# Patient Record
Sex: MALE | Race: BLACK OR AFRICAN AMERICAN | Employment: FULL TIME | URBAN - METROPOLITAN AREA
[De-identification: names, ages, dates, MRNs, and addresses within clinical notes are randomized per-mention and may not be internally consistent; named-entity substitution may affect disease eponyms.]

---

## 2017-02-09 ENCOUNTER — OFFICE VISIT (OUTPATIENT)
Dept: INTERNAL MEDICINE CLINIC | Facility: CLINIC | Age: 52
End: 2017-02-09

## 2017-02-09 ENCOUNTER — TELEPHONE (OUTPATIENT)
Dept: INTERNAL MEDICINE CLINIC | Facility: CLINIC | Age: 52
End: 2017-02-09

## 2017-02-09 VITALS
WEIGHT: 194.38 LBS | HEIGHT: 67 IN | SYSTOLIC BLOOD PRESSURE: 157 MMHG | TEMPERATURE: 98 F | DIASTOLIC BLOOD PRESSURE: 109 MMHG | HEART RATE: 91 BPM | BODY MASS INDEX: 30.51 KG/M2

## 2017-02-09 DIAGNOSIS — N52.9 ED (ERECTILE DYSFUNCTION) OF ORGANIC ORIGIN: ICD-10-CM

## 2017-02-09 DIAGNOSIS — IMO0001 UNCONTROLLED DIABETES MELLITUS TYPE 2 WITHOUT COMPLICATIONS, UNSPECIFIED LONG TERM INSULIN USE STATUS: ICD-10-CM

## 2017-02-09 DIAGNOSIS — R35.1 NOCTURIA: ICD-10-CM

## 2017-02-09 DIAGNOSIS — E78.5 HYPERLIPIDEMIA LDL GOAL <100: ICD-10-CM

## 2017-02-09 DIAGNOSIS — I10 ESSENTIAL HYPERTENSION: Primary | ICD-10-CM

## 2017-02-09 PROCEDURE — 99204 OFFICE O/P NEW MOD 45 MIN: CPT | Performed by: INTERNAL MEDICINE

## 2017-02-09 PROCEDURE — 99213 OFFICE O/P EST LOW 20 MIN: CPT | Performed by: INTERNAL MEDICINE

## 2017-02-09 RX ORDER — TADALAFIL 10 MG/1
10 TABLET ORAL
Qty: 6 TABLET | Refills: 3 | Status: SHIPPED | OUTPATIENT
Start: 2017-02-09 | End: 2018-01-26

## 2017-02-09 RX ORDER — ATORVASTATIN CALCIUM 40 MG/1
40 TABLET, FILM COATED ORAL NIGHTLY
Qty: 30 TABLET | Refills: 5 | Status: SHIPPED | OUTPATIENT
Start: 2017-02-09 | End: 2018-03-30

## 2017-02-09 RX ORDER — ATORVASTATIN CALCIUM 40 MG/1
40 TABLET, FILM COATED ORAL NIGHTLY
COMMUNITY
End: 2017-02-09

## 2017-02-09 RX ORDER — LOSARTAN POTASSIUM AND HYDROCHLOROTHIAZIDE 12.5; 5 MG/1; MG/1
30 TABLET ORAL DAILY
Qty: 30 TABLET | Refills: 5 | Status: SHIPPED | OUTPATIENT
Start: 2017-02-09 | End: 2017-02-10

## 2017-02-09 NOTE — TELEPHONE ENCOUNTER
Pharmacy      [facility] [id number] - [address], [address] [phone], [phone]       Medication Detail         Disp Refills Start End        Losartan Potassium-HCTZ 50-12.5 MG Oral Tab 30 tablet 5 2/9/2017       Sig - Route:  Take 30 t

## 2017-02-09 NOTE — TELEPHONE ENCOUNTER
Pt was seen in the office today. Melodie Smith states that they need clarification on losartan -hctz medication. Per Melodie Smith, please, send new e-script with clarification and direction. Per Benjamin Grey RN they will call pharmacy back.

## 2017-02-09 NOTE — PROGRESS NOTES
HPI:    Patient ID: Mariosl Andino is a 54year old male. This is a new pt. Pt is not currently taking atorvastatin calcium 40 mg. He has been off of atorvastatin for one year due to an issue with his insurance.  Due to the same insurance issue the pt had b shortness of breath. Cardiovascular: Negative for chest pain. Gastrointestinal: Negative for diarrhea, constipation and blood in stool.         Negative for melena   Genitourinary:        Positive for nocturia 3x       HISTORY:  Past Medical History no wheezes. He has no rales. He exhibits no tenderness. Abdominal: Soft. Bowel sounds are normal. He exhibits no distension and no mass. There is no tenderness. There is no rebound and no guarding. Musculoskeletal: Normal range of motion.  He exhibits n stress and learn stress management techniques. Limit alcohol.  Call us or go to ER if any chest pain or shortness of breath, severe headache, weakness in the muscles of face, arms, or legs, drowsiness,trouble speaking, confusion, fainting or change in visio foods that help move cholesterol out of the body. Eat more fruits and vegetables. If  overweight to work steadily on losing weight.  If  high blood pressure to keep blood pressure under control (low salt diet).    (N52.9) ED (erectile dysfunction) of organi

## 2017-02-10 ENCOUNTER — APPOINTMENT (OUTPATIENT)
Dept: LAB | Age: 52
End: 2017-02-10
Attending: INTERNAL MEDICINE
Payer: COMMERCIAL

## 2017-02-10 DIAGNOSIS — R35.1 NOCTURIA: ICD-10-CM

## 2017-02-10 DIAGNOSIS — I10 ESSENTIAL HYPERTENSION: ICD-10-CM

## 2017-02-10 DIAGNOSIS — IMO0001 UNCONTROLLED DIABETES MELLITUS TYPE 2 WITHOUT COMPLICATIONS, UNSPECIFIED LONG TERM INSULIN USE STATUS: ICD-10-CM

## 2017-02-10 DIAGNOSIS — E78.5 HYPERLIPIDEMIA LDL GOAL <100: ICD-10-CM

## 2017-02-10 LAB
ALBUMIN SERPL BCP-MCNC: 3.8 G/DL (ref 3.5–4.8)
ALBUMIN/GLOB SERPL: 1.1 {RATIO} (ref 1–2)
ALP SERPL-CCNC: 61 U/L (ref 32–100)
ALT SERPL-CCNC: 13 U/L (ref 17–63)
ANION GAP SERPL CALC-SCNC: 7 MMOL/L (ref 0–18)
AST SERPL-CCNC: 13 U/L (ref 15–41)
BILIRUB SERPL-MCNC: 1.1 MG/DL (ref 0.3–1.2)
BUN SERPL-MCNC: 16 MG/DL (ref 8–20)
BUN/CREAT SERPL: 16.8 (ref 10–20)
CALCIUM SERPL-MCNC: 9.1 MG/DL (ref 8.5–10.5)
CHLORIDE SERPL-SCNC: 99 MMOL/L (ref 95–110)
CHOLEST SERPL-MCNC: 240 MG/DL (ref 110–200)
CO2 SERPL-SCNC: 29 MMOL/L (ref 22–32)
CREAT SERPL-MCNC: 0.95 MG/DL (ref 0.5–1.5)
CREAT UR-MCNC: 75.6 MG/DL
GLOBULIN PLAS-MCNC: 3.4 G/DL (ref 2.5–3.7)
GLUCOSE SERPL-MCNC: 376 MG/DL (ref 70–99)
HBA1C MFR BLD: 12.4 % (ref 4–6)
HDLC SERPL-MCNC: 45 MG/DL
LDLC SERPL CALC-MCNC: 173 MG/DL (ref 0–99)
MICROALBUMIN UR-MCNC: 24.3 MG/DL (ref 0–1.8)
MICROALBUMIN/CREAT UR: 321.4 MG/G{CREAT} (ref 0–20)
NONHDLC SERPL-MCNC: 195 MG/DL
OSMOLALITY UR CALC.SUM OF ELEC: 297 MOSM/KG (ref 275–295)
POTASSIUM SERPL-SCNC: 4.4 MMOL/L (ref 3.3–5.1)
PROT SERPL-MCNC: 7.2 G/DL (ref 5.9–8.4)
PSA SERPL-MCNC: 0.9 NG/ML (ref 0–4)
SODIUM SERPL-SCNC: 135 MMOL/L (ref 136–144)
TRIGL SERPL-MCNC: 108 MG/DL (ref 1–149)

## 2017-02-10 PROCEDURE — 82043 UR ALBUMIN QUANTITATIVE: CPT

## 2017-02-10 PROCEDURE — 80053 COMPREHEN METABOLIC PANEL: CPT

## 2017-02-10 PROCEDURE — 36415 COLL VENOUS BLD VENIPUNCTURE: CPT

## 2017-02-10 PROCEDURE — 82570 ASSAY OF URINE CREATININE: CPT

## 2017-02-10 PROCEDURE — 80061 LIPID PANEL: CPT

## 2017-02-10 PROCEDURE — 83036 HEMOGLOBIN GLYCOSYLATED A1C: CPT

## 2017-02-10 RX ORDER — LOSARTAN POTASSIUM AND HYDROCHLOROTHIAZIDE 12.5; 5 MG/1; MG/1
1 TABLET ORAL DAILY
Qty: 30 TABLET | Refills: 5 | Status: SHIPPED | OUTPATIENT
Start: 2017-02-10 | End: 2017-11-21

## 2017-02-24 ENCOUNTER — OFFICE VISIT (OUTPATIENT)
Dept: INTERNAL MEDICINE CLINIC | Facility: CLINIC | Age: 52
End: 2017-02-24

## 2017-02-24 ENCOUNTER — TELEPHONE (OUTPATIENT)
Dept: INTERNAL MEDICINE CLINIC | Facility: CLINIC | Age: 52
End: 2017-02-24

## 2017-02-24 VITALS
TEMPERATURE: 99 F | HEIGHT: 67 IN | HEART RATE: 86 BPM | DIASTOLIC BLOOD PRESSURE: 80 MMHG | SYSTOLIC BLOOD PRESSURE: 136 MMHG

## 2017-02-24 DIAGNOSIS — E11.9 TYPE 2 DIABETES MELLITUS WITHOUT COMPLICATION, UNSPECIFIED LONG TERM INSULIN USE STATUS: ICD-10-CM

## 2017-02-24 DIAGNOSIS — I10 ESSENTIAL HYPERTENSION: Primary | ICD-10-CM

## 2017-02-24 PROCEDURE — 99212 OFFICE O/P EST SF 10 MIN: CPT | Performed by: INTERNAL MEDICINE

## 2017-02-24 PROCEDURE — 99214 OFFICE O/P EST MOD 30 MIN: CPT | Performed by: INTERNAL MEDICINE

## 2017-02-24 NOTE — PROGRESS NOTES
HPI:    Patient ID: Ej Sheikh is a 54year old male. HPI     Diabetes  He presents for his follow-up diabetic visit. He has type 2 diabetes mellitus. Disease course: not controlled. Pertinent negatives for hypoglycemia include no speech difficulty.  Pe tablet Rfl: 5   aspirin 81 MG Oral Tab Take 81 mg by mouth daily. Disp:  Rfl:    Tadalafil (CIALIS) 10 MG Oral Tab Take 1 tablet (10 mg total) by mouth daily as needed for Erectile Dysfunction.  Disp: 6 tablet Rfl: 3   MetFORMIN HCl 1000 MG Oral Tab Take 1 double it and took only 1 tablet today.    Plan: Patient will continue present medications : Losartan Potassium-HCTZ 50-12.5 mg.      2. (E11.9) Type 2 diabetes mellitus without complication, unspecified long term insulin use status ([facility].)  On 2/10/2017 HgA1 Meli Vences, Angeline Kim MD,  personally performed the services described in this documentation. All medical record entries made by the scribe were at my direction and in my presence.   I have reviewed the chart and discharge instructions (if applicable) and

## 2017-02-24 NOTE — TELEPHONE ENCOUNTER
Per pharmacy on file Khloe Cast is too much expensive for pt,  Would like to know any alternatives that it is cheaper.

## 2017-02-25 NOTE — TELEPHONE ENCOUNTER
Pharmacist stts Januvia very expensive. Alernative that is less expensive is Metformin or Actos. Please advise.

## 2017-02-27 ENCOUNTER — OFFICE VISIT (OUTPATIENT)
Dept: INTERNAL MEDICINE CLINIC | Facility: CLINIC | Age: 52
End: 2017-02-27

## 2017-02-27 VITALS
HEART RATE: 87 BPM | BODY MASS INDEX: 31 KG/M2 | SYSTOLIC BLOOD PRESSURE: 152 MMHG | TEMPERATURE: 98 F | DIASTOLIC BLOOD PRESSURE: 97 MMHG | WEIGHT: 196.31 LBS

## 2017-02-27 DIAGNOSIS — I10 ESSENTIAL HYPERTENSION: Primary | ICD-10-CM

## 2017-02-27 DIAGNOSIS — E78.5 HYPERLIPIDEMIA LDL GOAL <100: ICD-10-CM

## 2017-02-27 DIAGNOSIS — E11.65 UNCONTROLLED TYPE 2 DIABETES MELLITUS WITH MICROALBUMINURIA, UNSPECIFIED LONG TERM INSULIN USE STATUS: ICD-10-CM

## 2017-02-27 DIAGNOSIS — E11.29 UNCONTROLLED TYPE 2 DIABETES MELLITUS WITH MICROALBUMINURIA, UNSPECIFIED LONG TERM INSULIN USE STATUS: ICD-10-CM

## 2017-02-27 DIAGNOSIS — R80.9 UNCONTROLLED TYPE 2 DIABETES MELLITUS WITH MICROALBUMINURIA, UNSPECIFIED LONG TERM INSULIN USE STATUS: ICD-10-CM

## 2017-02-27 PROCEDURE — 99212 OFFICE O/P EST SF 10 MIN: CPT | Performed by: INTERNAL MEDICINE

## 2017-02-27 PROCEDURE — 99214 OFFICE O/P EST MOD 30 MIN: CPT | Performed by: INTERNAL MEDICINE

## 2017-02-27 RX ORDER — PIOGLITAZONEHYDROCHLORIDE 30 MG/1
30 TABLET ORAL DAILY
Qty: 30 TABLET | Refills: 5 | Status: SHIPPED | OUTPATIENT
Start: 2017-02-27 | End: 2017-11-21

## 2017-02-27 NOTE — PROGRESS NOTES
HPI:    Patient ID: Pete Ortiz is a 54year old male. HPI  Hypertension  The problem has been gradually improving (Blood Pressure upon arrival was 152/97.) since onset. Pertinent negatives include no chest pain or shortness of breath.  Risk factors for Psychiatric/Behavioral: Negative for confusion. The patient is not nervous/anxious.         HISTORY:  Past Medical History   Diagnosis Date   • Diabetes [facility])    • Essential hypertension           Past Surgical History    CATARACT        Family History (36.7 °C)   TempSrc: Oral   Weight: 196 lb 4.8 oz (89.041 kg)     Body mass index is 30.74 kg/(m^2).     Diabetes:      Lab Results  Component Value Date   HGBA1C 12.4* 02/10/2017       Lab Results  Component Value Date   CHOLEST 240* 02/10/2017       Lab R 02/10/2017 indicated Hg A1c at 12.4 elevated. Previous treatment included JANUVIA 50 MG and MetFORMIN HCl 1000 MG. Labs on 02/10/2017 show Microalbumin, Urine at 24.3 (H).  The patient is compliant with Metformin Hcl 1000 MG BID, but has not taken Januvia 5

## 2017-03-02 DIAGNOSIS — I10 ESSENTIAL HYPERTENSION: ICD-10-CM

## 2017-03-04 RX ORDER — LOSARTAN POTASSIUM AND HYDROCHLOROTHIAZIDE 12.5; 5 MG/1; MG/1
1 TABLET ORAL DAILY
Qty: 30 TABLET | Refills: 5 | OUTPATIENT
Start: 2017-03-04

## 2017-03-04 NOTE — TELEPHONE ENCOUNTER
From: Ron Hare  To: Shauna Conn MD  Sent: 3/2/2017 12:15 PM CST  Subject: Medication Renewal Request    Original authorizing provider: MD Ron Pacheco would like a refill of the following medications:  Losartan Potassium-HCTZ 50-12.5

## 2017-03-06 ENCOUNTER — TELEPHONE (OUTPATIENT)
Dept: INTERNAL MEDICINE CLINIC | Facility: CLINIC | Age: 52
End: 2017-03-06

## 2017-03-06 NOTE — TELEPHONE ENCOUNTER
Pt sent a [employer] chart message     Markie Penn    To   Sugar Mendoza MD    Sent   3/6/2017  8:57 AM         2/28/17 @ 7:45 pm 227     3/05/17 @ 7:45 am 178   3/01/17 @ 9:00 am 234     3/05/17 @ 8:10 pm 180   3/01/17 @ 8:40 pm 200    3/06/17 @ 7:50  am 176   3/

## 2017-03-07 NOTE — TELEPHONE ENCOUNTER
[phone] is calling for status of the paper work he requested. Per pt he needs the information asap. Pt is also requesting a call back from the RN.

## 2017-03-09 ENCOUNTER — TELEPHONE (OUTPATIENT)
Dept: INTERNAL MEDICINE CLINIC | Facility: CLINIC | Age: 52
End: 2017-03-09

## 2017-11-21 DIAGNOSIS — IMO0001 UNCONTROLLED DIABETES MELLITUS TYPE 2 WITHOUT COMPLICATIONS, UNSPECIFIED LONG TERM INSULIN USE STATUS: ICD-10-CM

## 2017-11-21 DIAGNOSIS — I10 ESSENTIAL HYPERTENSION: ICD-10-CM

## 2017-11-24 RX ORDER — LOSARTAN POTASSIUM AND HYDROCHLOROTHIAZIDE 12.5; 5 MG/1; MG/1
TABLET ORAL
Qty: 30 TABLET | Refills: 0 | Status: SHIPPED | OUTPATIENT
Start: 2017-11-24 | End: 2017-12-22

## 2017-11-24 RX ORDER — PIOGLITAZONEHYDROCHLORIDE 30 MG/1
TABLET ORAL
Qty: 30 TABLET | Refills: 0 | Status: SHIPPED | OUTPATIENT
Start: 2017-11-24 | End: 2017-12-22

## 2017-11-24 NOTE — TELEPHONE ENCOUNTER
CSS: please reach out to patient. Will need to establish care with other provider. Let us know if he is completely out of meds.       Diabetes Medications  Protocol Criteria:  · Appointment scheduled in the past 6 months or the next 3 months  · A1C < 7.5 in

## 2017-11-24 NOTE — TELEPHONE ENCOUNTER
Pt does not meet criteria due to abnormal labs, will establish with Dr. Daryle Draft, lync message sent as well.

## 2017-12-22 ENCOUNTER — OFFICE VISIT (OUTPATIENT)
Dept: INTERNAL MEDICINE CLINIC | Facility: CLINIC | Age: 52
End: 2017-12-22

## 2017-12-22 VITALS
DIASTOLIC BLOOD PRESSURE: 101 MMHG | HEART RATE: 88 BPM | RESPIRATION RATE: 12 BRPM | WEIGHT: 205 LBS | TEMPERATURE: 96 F | BODY MASS INDEX: 32.18 KG/M2 | SYSTOLIC BLOOD PRESSURE: 147 MMHG | HEIGHT: 67 IN

## 2017-12-22 DIAGNOSIS — I10 ESSENTIAL HYPERTENSION: ICD-10-CM

## 2017-12-22 DIAGNOSIS — Z12.11 COLON CANCER SCREENING: ICD-10-CM

## 2017-12-22 DIAGNOSIS — IMO0001 UNCONTROLLED DIABETES MELLITUS TYPE 2 WITHOUT COMPLICATIONS, UNSPECIFIED LONG TERM INSULIN USE STATUS: Primary | ICD-10-CM

## 2017-12-22 DIAGNOSIS — R01.1 HEART MURMUR: ICD-10-CM

## 2017-12-22 DIAGNOSIS — Z23 NEED FOR VACCINATION: ICD-10-CM

## 2017-12-22 DIAGNOSIS — E78.5 HYPERLIPIDEMIA LDL GOAL <100: ICD-10-CM

## 2017-12-22 DIAGNOSIS — N52.9 ERECTILE DYSFUNCTION, UNSPECIFIED ERECTILE DYSFUNCTION TYPE: ICD-10-CM

## 2017-12-22 DIAGNOSIS — D22.9 ATYPICAL MOLE: ICD-10-CM

## 2017-12-22 PROCEDURE — 99212 OFFICE O/P EST SF 10 MIN: CPT | Performed by: INTERNAL MEDICINE

## 2017-12-22 PROCEDURE — 90471 IMMUNIZATION ADMIN: CPT | Performed by: INTERNAL MEDICINE

## 2017-12-22 PROCEDURE — 99214 OFFICE O/P EST MOD 30 MIN: CPT | Performed by: INTERNAL MEDICINE

## 2017-12-22 PROCEDURE — 90472 IMMUNIZATION ADMIN EACH ADD: CPT | Performed by: INTERNAL MEDICINE

## 2017-12-22 PROCEDURE — 90732 PPSV23 VACC 2 YRS+ SUBQ/IM: CPT | Performed by: INTERNAL MEDICINE

## 2017-12-22 PROCEDURE — 90686 IIV4 VACC NO PRSV 0.5 ML IM: CPT | Performed by: INTERNAL MEDICINE

## 2017-12-22 RX ORDER — LANCETS
EACH MISCELLANEOUS
COMMUNITY
Start: 2014-07-28 | End: 2021-08-04

## 2017-12-22 RX ORDER — METFORMIN HYDROCHLORIDE 500 MG/1
1000 TABLET, EXTENDED RELEASE ORAL 2 TIMES DAILY WITH MEALS
Qty: 360 TABLET | Refills: 0 | Status: SHIPPED | OUTPATIENT
Start: 2017-12-22 | End: 2018-02-05

## 2017-12-22 RX ORDER — AMLODIPINE BESYLATE 5 MG/1
5 TABLET ORAL DAILY
Qty: 90 TABLET | Refills: 0 | Status: SHIPPED | OUTPATIENT
Start: 2017-12-22 | End: 2018-02-05

## 2017-12-22 RX ORDER — GLIPIZIDE 5 MG/1
5 TABLET, FILM COATED, EXTENDED RELEASE ORAL DAILY
Qty: 90 TABLET | Refills: 0 | Status: SHIPPED | OUTPATIENT
Start: 2017-12-22 | End: 2018-01-13

## 2017-12-22 RX ORDER — BLOOD SUGAR DIAGNOSTIC
STRIP MISCELLANEOUS
COMMUNITY
Start: 2014-07-28

## 2017-12-22 RX ORDER — LOSARTAN POTASSIUM AND HYDROCHLOROTHIAZIDE 12.5; 5 MG/1; MG/1
1 TABLET ORAL
Qty: 90 TABLET | Refills: 0 | Status: SHIPPED | OUTPATIENT
Start: 2017-12-22 | End: 2018-02-05

## 2017-12-22 RX ORDER — BLOOD-GLUCOSE METER
EACH MISCELLANEOUS
COMMUNITY
Start: 2014-07-28

## 2017-12-22 NOTE — PROGRESS NOTES
HPI:    Patient ID: Pete Ortiz is a 50year old male. Diabetes   He presents for his follow-up diabetic visit. He has type 2 diabetes mellitus. His disease course has been improving. There are no hypoglycemic associated symptoms.  There are no diabetic a thyroid problem. There is no history of chronic renal disease or a hypertension causing med. Hyperlipidemia   This is a chronic problem. The current episode started more than 1 year ago. The problem is uncontrolled.  Recent lipid tests were reviewed and nocturia, penile pain, testicular pain and urgency. ED started since 1 yr    Neurological: Negative for syncope.             Current Outpatient Prescriptions:  Glucose Blood (ACCU-CHEK NAM PLUS) In Vitro Strip  Disp:  Rfl:    ACCU-CHEK SOFTCLIX ENRRIQUE are normal. He exhibits no distension. There is no tenderness. There is no rebound and no guarding. Musculoskeletal: Normal range of motion. He exhibits no edema.    Dm  Foot exam: skin intact; toe nails normal; pedal pulses normal; monofilament testing screening  Plan: GASTRO - INTERNAL        Pt told he is due for screen colonoscopy so referral given.      (N52.9) Erectile dysfunction, unspecified erectile dysfunction type  Plan: UROLOGY - INTERNAL        Pt had tried different meds even herbal and not

## 2018-01-12 ENCOUNTER — HOSPITAL ENCOUNTER (OUTPATIENT)
Dept: CARDIOLOGY CLINIC | Age: 53
Discharge: HOME | End: 2018-01-12
Attending: INTERNAL MEDICINE
Payer: COMMERCIAL

## 2018-01-12 DIAGNOSIS — R01.1 HEART MURMUR: ICD-10-CM

## 2018-01-12 PROCEDURE — 93306 TTE W/DOPPLER COMPLETE: CPT | Performed by: INTERNAL MEDICINE

## 2018-01-13 RX ORDER — GLIPIZIDE 5 MG/1
5 TABLET, FILM COATED, EXTENDED RELEASE ORAL DAILY
Qty: 90 TABLET | Refills: 0
Start: 2018-01-13

## 2018-01-14 ENCOUNTER — TELEPHONE (OUTPATIENT)
Dept: INTERNAL MEDICINE CLINIC | Facility: CLINIC | Age: 53
End: 2018-01-14

## 2018-01-14 DIAGNOSIS — I51.89 DIASTOLIC DYSFUNCTION WITHOUT HEART FAILURE: Primary | ICD-10-CM

## 2018-01-15 RX ORDER — GLIPIZIDE 5 MG/1
5 TABLET, FILM COATED, EXTENDED RELEASE ORAL DAILY
Qty: 90 TABLET | Refills: 0 | Status: SHIPPED | OUTPATIENT
Start: 2018-01-15 | End: 2018-03-13 | Stop reason: DRUGHIGH

## 2018-01-15 NOTE — TELEPHONE ENCOUNTER
From: Juan Jose Mcguire  Sent: 1/13/2018 10:37 AM CST  Subject: Medication Renewal Request    Gorge Alejandre would like a refill of the following medications:     GlipiZIDE ER 5 MG Oral Tablet 24 Hr [Jatinder Hein MD]   Patient Comment: [employer] was so

## 2018-01-26 ENCOUNTER — OFFICE VISIT (OUTPATIENT)
Dept: DERMATOLOGY CLINIC | Facility: CLINIC | Age: 53
End: 2018-01-26

## 2018-01-26 DIAGNOSIS — D23.60 BENIGN NEOPLASM OF SKIN OF UPPER LIMB, INCLUDING SHOULDER, UNSPECIFIED LATERALITY: ICD-10-CM

## 2018-01-26 DIAGNOSIS — D23.70 BENIGN NEOPLASM OF SKIN OF LOWER LIMB, INCLUDING HIP, UNSPECIFIED LATERALITY: ICD-10-CM

## 2018-01-26 DIAGNOSIS — D22.9 MULTIPLE NEVI: Primary | ICD-10-CM

## 2018-01-26 PROCEDURE — 99203 OFFICE O/P NEW LOW 30 MIN: CPT | Performed by: DERMATOLOGY

## 2018-01-26 PROCEDURE — 99212 OFFICE O/P EST SF 10 MIN: CPT | Performed by: DERMATOLOGY

## 2018-02-02 ENCOUNTER — OFFICE VISIT (OUTPATIENT)
Dept: SURGERY | Facility: CLINIC | Age: 53
End: 2018-02-02

## 2018-02-02 ENCOUNTER — TELEPHONE (OUTPATIENT)
Dept: SURGERY | Facility: CLINIC | Age: 53
End: 2018-02-02

## 2018-02-02 VITALS
WEIGHT: 207 LBS | HEIGHT: 68 IN | BODY MASS INDEX: 31.37 KG/M2 | SYSTOLIC BLOOD PRESSURE: 174 MMHG | DIASTOLIC BLOOD PRESSURE: 118 MMHG | HEART RATE: 84 BPM

## 2018-02-02 DIAGNOSIS — N52.01 ERECTILE DYSFUNCTION DUE TO ARTERIAL INSUFFICIENCY: Primary | ICD-10-CM

## 2018-02-02 PROCEDURE — 99212 OFFICE O/P EST SF 10 MIN: CPT | Performed by: UROLOGY

## 2018-02-02 PROCEDURE — 99244 OFF/OP CNSLTJ NEW/EST MOD 40: CPT | Performed by: UROLOGY

## 2018-02-02 RX ORDER — SILDENAFIL CITRATE 20 MG/1
20 TABLET ORAL AS NEEDED
Qty: 20 TABLET | Refills: 11 | Status: SHIPPED | OUTPATIENT
Start: 2018-02-02 | End: 2018-05-05

## 2018-02-02 NOTE — PROGRESS NOTES
SUBJECTIVE:  Juan Jose Mcguire is a 50year old male who presents for a consultation at the request of, and a copy of this note will be sent to, Dr. Radha Palmer, for evaluation of  erectile dysfunction. He states that the problem is unchanged.  Symptoms include Ht 5' 8\" (1.727 m)   Wt 207 lb (93.9 kg)   BMI 31.47 kg/m²   He appears well, in no apparent distress. Alert and oriented times three, pleasant and cooperative.   CARDIOVASCULAR:normal apical impulse  RESPIRATORY: no chest wall deformities or tenderness

## 2018-02-05 DIAGNOSIS — I10 ESSENTIAL HYPERTENSION: ICD-10-CM

## 2018-02-05 RX ORDER — METFORMIN HYDROCHLORIDE 500 MG/1
1000 TABLET, EXTENDED RELEASE ORAL 2 TIMES DAILY WITH MEALS
Qty: 360 TABLET | Refills: 0 | Status: SHIPPED | OUTPATIENT
Start: 2018-02-05 | End: 2018-06-07

## 2018-02-05 RX ORDER — AMLODIPINE BESYLATE 5 MG/1
5 TABLET ORAL DAILY
Qty: 90 TABLET | Refills: 0 | Status: SHIPPED | OUTPATIENT
Start: 2018-02-05 | End: 2018-03-13

## 2018-02-05 RX ORDER — LOSARTAN POTASSIUM AND HYDROCHLOROTHIAZIDE 12.5; 5 MG/1; MG/1
1 TABLET ORAL
Qty: 90 TABLET | Refills: 0 | Status: SHIPPED | OUTPATIENT
Start: 2018-02-05 | End: 2018-03-13 | Stop reason: DRUGHIGH

## 2018-02-05 NOTE — TELEPHONE ENCOUNTER
From: Nazia Rosales  Sent: 2/5/2018 11:48 AM CST  Subject: Medication Renewal Request    Gorge Alejandre would like a refill of the following medications:     MetFORMIN HCl  MG Oral Tablet 24 Hr [Jatinder Hein MD]     Losartan Potassium-HCTZ 50

## 2018-02-05 NOTE — PROGRESS NOTES
Kelley Maryann is a 50year old male. Patient presents with:  Moles: NEW PT here for eval of moles around his ankles PCP noticed at last visit and recommended he be seen pt states hes had them for years -[facility]            Patient has no known allergies. NAM PLUS) w/Device Does not apply Kit  Disp:  Rfl:    MetFORMIN HCl  MG Oral Tablet 24 Hr Take 2 tablets (1,000 mg total) by mouth 2 (two) times daily with meals.  Disp: 360 tablet Rfl: 0   Losartan Potassium-HCTZ 50-12.5 MG Oral Tab Take 1 tablet b History, medications, allergies as noted. Physical examination: Patient  well-developed well-nourished, alert oriented in no acute distress.   Exam of involved, appropriate areas of skin performed, including scalp, head, neck, face,nails, hair, external noted in rtc or p.r.n. No orders of the defined types were placed in this encounter.       Meds & Refills for this Visit:   No prescriptions requested or ordered in this encounter    Multiple nevi  (primary encounter diagnosis)  Benign neoplasm of skin o

## 2018-02-06 NOTE — TELEPHONE ENCOUNTER
Refilled per protocol    Diabetes Medications  Protocol Criteria:  · Appointment scheduled in the past 6 months or the next 3 months  · A1C < 7.5 in the past 6 months  · Creatinine in the past 12 months  · Creatinine result < 1.5   Recent Outpatient Visits 150 Miki Jameson MD    Office Visit    11 months ago Essential hypertension    Jason Hodges MD    Office Visit    11 months ago Essential hypertension    [address]

## 2018-03-03 ENCOUNTER — APPOINTMENT (OUTPATIENT)
Dept: LAB | Age: 53
End: 2018-03-03
Attending: INTERNAL MEDICINE
Payer: COMMERCIAL

## 2018-03-03 DIAGNOSIS — IMO0001 UNCONTROLLED DIABETES MELLITUS TYPE 2 WITHOUT COMPLICATIONS, UNSPECIFIED LONG TERM INSULIN USE STATUS: ICD-10-CM

## 2018-03-03 DIAGNOSIS — E78.5 HYPERLIPIDEMIA LDL GOAL <100: ICD-10-CM

## 2018-03-03 LAB
ALBUMIN SERPL BCP-MCNC: 3.6 G/DL (ref 3.5–4.8)
ALBUMIN/GLOB SERPL: 1.1 {RATIO} (ref 1–2)
ALP SERPL-CCNC: 56 U/L (ref 32–100)
ALT SERPL-CCNC: 15 U/L (ref 17–63)
ANION GAP SERPL CALC-SCNC: 8 MMOL/L (ref 0–18)
AST SERPL-CCNC: 16 U/L (ref 15–41)
BACTERIA UR QL AUTO: NEGATIVE /HPF
BILIRUB SERPL-MCNC: 1 MG/DL (ref 0.3–1.2)
BILIRUB UR QL: NEGATIVE
BUN SERPL-MCNC: 11 MG/DL (ref 8–20)
BUN/CREAT SERPL: 11.5 (ref 10–20)
CALCIUM SERPL-MCNC: 9.2 MG/DL (ref 8.5–10.5)
CHLORIDE SERPL-SCNC: 101 MMOL/L (ref 95–110)
CHOLEST SERPL-MCNC: 122 MG/DL (ref 110–200)
CLARITY UR: CLEAR
CO2 SERPL-SCNC: 28 MMOL/L (ref 22–32)
COLOR UR: YELLOW
CREAT SERPL-MCNC: 0.96 MG/DL (ref 0.5–1.5)
CREAT UR-MCNC: 137.4 MG/DL
GLOBULIN PLAS-MCNC: 3.2 G/DL (ref 2.5–3.7)
GLUCOSE SERPL-MCNC: 240 MG/DL (ref 70–99)
GLUCOSE UR-MCNC: >=500 MG/DL
HDLC SERPL-MCNC: 40 MG/DL
HYALINE CASTS #/AREA URNS AUTO: 1 /LPF
KETONES UR-MCNC: NEGATIVE MG/DL
LDLC SERPL CALC-MCNC: 70 MG/DL (ref 0–99)
LEUKOCYTE ESTERASE UR QL STRIP.AUTO: NEGATIVE
MICROALBUMIN UR-MCNC: 77.1 MG/DL (ref 0–1.8)
MICROALBUMIN/CREAT UR: 561.1 MG/G{CREAT} (ref 0–20)
NITRITE UR QL STRIP.AUTO: NEGATIVE
NONHDLC SERPL-MCNC: 82 MG/DL
OSMOLALITY UR CALC.SUM OF ELEC: 291 MOSM/KG (ref 275–295)
PATIENT FASTING: YES
PH UR: 5 [PH] (ref 5–8)
POTASSIUM SERPL-SCNC: 3.9 MMOL/L (ref 3.3–5.1)
PROT SERPL-MCNC: 6.8 G/DL (ref 5.9–8.4)
PROT UR-MCNC: 100 MG/DL
RBC #/AREA URNS AUTO: 1 /HPF
SODIUM SERPL-SCNC: 137 MMOL/L (ref 136–144)
SP GR UR STRIP: 1.02 (ref 1–1.03)
TRIGL SERPL-MCNC: 62 MG/DL (ref 1–149)
UROBILINOGEN UR STRIP-ACNC: <2
VIT C UR-MCNC: NEGATIVE MG/DL
WBC #/AREA URNS AUTO: <1 /HPF

## 2018-03-03 PROCEDURE — 80061 LIPID PANEL: CPT

## 2018-03-03 PROCEDURE — 82570 ASSAY OF URINE CREATININE: CPT | Performed by: INTERNAL MEDICINE

## 2018-03-03 PROCEDURE — 83036 HEMOGLOBIN GLYCOSYLATED A1C: CPT

## 2018-03-03 PROCEDURE — 82043 UR ALBUMIN QUANTITATIVE: CPT | Performed by: INTERNAL MEDICINE

## 2018-03-03 PROCEDURE — 80053 COMPREHEN METABOLIC PANEL: CPT

## 2018-03-03 PROCEDURE — 36415 COLL VENOUS BLD VENIPUNCTURE: CPT

## 2018-03-03 PROCEDURE — 81001 URINALYSIS AUTO W/SCOPE: CPT

## 2018-03-04 LAB — HBA1C MFR BLD: 10.3 % (ref 4–6)

## 2018-03-06 ENCOUNTER — TELEPHONE (OUTPATIENT)
Dept: FAMILY MEDICINE CLINIC | Facility: CLINIC | Age: 53
End: 2018-03-06

## 2018-03-07 NOTE — TELEPHONE ENCOUNTER
Called pt and informed that he needs to sign the form before I can fax it to occupational health. Pt understood and will come in tomorrow to sign. I have the form by me in IM nurses station.

## 2018-03-07 NOTE — TELEPHONE ENCOUNTER
Spoke with Dr. Tiffany Yanez and he has the form. Dr. Tiffany Yanez will complete the form since its asking about DM. Pt is already being seen by occupational health. Pt informed when form is completed it will be faxed. I will follow up with patient when this has been done.

## 2018-03-08 NOTE — TELEPHONE ENCOUNTER
Patient came into office to sign form. Form was faxed to [facility] at fax # provided on form. Original given to patient.

## 2018-03-12 RX ORDER — SILDENAFIL CITRATE 20 MG/1
20 TABLET ORAL AS NEEDED
Qty: 20 TABLET | Refills: 11
Start: 2018-03-12

## 2018-03-13 ENCOUNTER — OFFICE VISIT (OUTPATIENT)
Dept: INTERNAL MEDICINE CLINIC | Facility: CLINIC | Age: 53
End: 2018-03-13

## 2018-03-13 VITALS
WEIGHT: 201 LBS | HEART RATE: 89 BPM | HEIGHT: 68 IN | DIASTOLIC BLOOD PRESSURE: 96 MMHG | RESPIRATION RATE: 12 BRPM | TEMPERATURE: 96 F | SYSTOLIC BLOOD PRESSURE: 156 MMHG | BODY MASS INDEX: 30.46 KG/M2

## 2018-03-13 DIAGNOSIS — I10 ESSENTIAL HYPERTENSION: Primary | ICD-10-CM

## 2018-03-13 DIAGNOSIS — E11.65 UNCONTROLLED TYPE 2 DIABETES MELLITUS WITH DIABETIC NEPHROPATHY, WITHOUT LONG-TERM CURRENT USE OF INSULIN (HCC): ICD-10-CM

## 2018-03-13 DIAGNOSIS — E11.21 UNCONTROLLED TYPE 2 DIABETES MELLITUS WITH DIABETIC NEPHROPATHY, WITHOUT LONG-TERM CURRENT USE OF INSULIN (HCC): ICD-10-CM

## 2018-03-13 DIAGNOSIS — I51.89 DIASTOLIC DYSFUNCTION WITHOUT HEART FAILURE: ICD-10-CM

## 2018-03-13 DIAGNOSIS — E78.00 HYPERCHOLESTEREMIA: ICD-10-CM

## 2018-03-13 PROCEDURE — 99212 OFFICE O/P EST SF 10 MIN: CPT | Performed by: INTERNAL MEDICINE

## 2018-03-13 PROCEDURE — 99214 OFFICE O/P EST MOD 30 MIN: CPT | Performed by: INTERNAL MEDICINE

## 2018-03-13 RX ORDER — LOSARTAN POTASSIUM AND HYDROCHLOROTHIAZIDE 25; 100 MG/1; MG/1
1 TABLET ORAL DAILY
Qty: 90 TABLET | Refills: 1 | Status: SHIPPED | OUTPATIENT
Start: 2018-03-13 | End: 2018-03-19

## 2018-03-13 RX ORDER — AMLODIPINE BESYLATE 5 MG/1
5 TABLET ORAL DAILY
Qty: 90 TABLET | Refills: 1 | Status: SHIPPED | OUTPATIENT
Start: 2018-03-13 | End: 2018-07-18

## 2018-03-13 RX ORDER — GLIMEPIRIDE 2 MG/1
2 TABLET ORAL
Qty: 90 TABLET | Refills: 0 | Status: SHIPPED | OUTPATIENT
Start: 2018-03-13 | End: 2018-06-07

## 2018-03-13 NOTE — TELEPHONE ENCOUNTER
From: Aubrie Barrientos  Sent: 3/12/2018 10:06 PM CDT  Subject: Medication Renewal Request    Gorge CMichael Alejandre would like a refill of the following medications:     Sildenafil Citrate 20 MG Oral Tab Manus MD Pretty]    Preferred pharmacy: [address] [place] [address] [phone], [phone]        Medication renewals requested in this message routed separately:     GlipiZIDE ER 5 MG Oral Tablet 24 Hr [Jatinder Hein MD]

## 2018-03-13 NOTE — TELEPHONE ENCOUNTER
From: Chavo Lomas  Sent: 3/12/2018 10:06 PM CDT  Subject: Medication Renewal Request    Gorge Alejandre would like a refill of the following medications:     GlipiZIDE ER 5 MG Oral Tablet 24 Hr Maria R Pereyra MD]    Preferred pharmacy: [address] [place] [address] [phone], [phone]        Medication renewals requested in this message routed separately:     Sildenafil Citrate 20 MG Oral Tab Tawanda Box MD]

## 2018-03-13 NOTE — PROGRESS NOTES
HPI:    Patient ID: Libby Neville is a 50year old male. Diabetes   He presents for his follow-up diabetic visit. He has type 2 diabetes mellitus. His disease course has been fluctuating. There are no hypoglycemic associated symptoms.  There are no diabe problem. The current episode started more than 1 year ago. The problem is controlled. Exacerbating diseases include diabetes. Factors aggravating his hyperlipidemia include thiazides. Pertinent negatives include no chest pain or shortness of breath.  [place] Ho Nose normal.   Mouth/Throat: Oropharynx is clear and moist. No oropharyngeal exudate. Eyes: Conjunctivae are normal. Right eye exhibits no discharge. Left eye exhibits no discharge. No scleral icterus. Neck: Normal range of motion. Neck supple.  No JVD prescriptions requested or ordered in this encounter    Imaging & Referrals:  None       #[id number]

## 2018-03-16 RX ORDER — GLIPIZIDE 5 MG/1
5 TABLET, FILM COATED, EXTENDED RELEASE ORAL DAILY
Qty: 90 TABLET | Refills: 0
Start: 2018-03-16

## 2018-03-19 RX ORDER — LOSARTAN POTASSIUM AND HYDROCHLOROTHIAZIDE 25; 100 MG/1; MG/1
1 TABLET ORAL DAILY
Qty: 90 TABLET | Refills: 0 | Status: SHIPPED | OUTPATIENT
Start: 2018-03-19 | End: 2018-07-18

## 2018-03-19 NOTE — TELEPHONE ENCOUNTER
From: Manny Galvan  Sent: 3/19/2018 10:32 AM CDT  Subject: Medication Renewal Request    Gorge Alejandre would like a refill of the following medications:     Losartan Potassium-HCTZ 100-25 MG Oral Tab [Jatinder Hein MD]   Patient Comment: not on or

## 2018-03-19 NOTE — TELEPHONE ENCOUNTER
Hypertensive Medications  Protocol Criteria:  · Appointment scheduled in the past 6 months or in the next 3 months  · BMP or CMP in the past 12 months  · Creatinine result < 2  Recent Outpatient Visits            6 days ago Essential hypertension    [place]

## 2018-03-30 ENCOUNTER — OFFICE VISIT (OUTPATIENT)
Dept: INTERNAL MEDICINE CLINIC | Facility: CLINIC | Age: 53
End: 2018-03-30

## 2018-03-30 VITALS
RESPIRATION RATE: 18 BRPM | HEART RATE: 97 BPM | BODY MASS INDEX: 30.77 KG/M2 | SYSTOLIC BLOOD PRESSURE: 128 MMHG | HEIGHT: 68 IN | TEMPERATURE: 98 F | DIASTOLIC BLOOD PRESSURE: 85 MMHG | WEIGHT: 203 LBS

## 2018-03-30 DIAGNOSIS — I10 ESSENTIAL HYPERTENSION: ICD-10-CM

## 2018-03-30 DIAGNOSIS — E11.65 UNCONTROLLED TYPE 2 DIABETES MELLITUS WITH DIABETIC NEPHROPATHY, WITHOUT LONG-TERM CURRENT USE OF INSULIN (HCC): Primary | ICD-10-CM

## 2018-03-30 DIAGNOSIS — E11.21 UNCONTROLLED TYPE 2 DIABETES MELLITUS WITH DIABETIC NEPHROPATHY, WITHOUT LONG-TERM CURRENT USE OF INSULIN (HCC): Primary | ICD-10-CM

## 2018-03-30 DIAGNOSIS — I51.89 DIASTOLIC DYSFUNCTION WITHOUT HEART FAILURE: ICD-10-CM

## 2018-03-30 PROCEDURE — 99212 OFFICE O/P EST SF 10 MIN: CPT | Performed by: INTERNAL MEDICINE

## 2018-03-30 PROCEDURE — 99213 OFFICE O/P EST LOW 20 MIN: CPT | Performed by: INTERNAL MEDICINE

## 2018-03-30 NOTE — PROGRESS NOTES
HPI:    Patient ID: Libby Neville is a 50year old male. HTN   This is a chronic problem. The problem has been rapidly improving since onset. The problem is controlled. Pertinent negatives include no chest pain, peripheral edema or shortness of breath. Cardiovascular: Negative. Negative for chest pain. Current Outpatient Prescriptions:  Losartan Potassium-HCTZ 100-25 MG Oral Tab Take 1 tablet by mouth daily.  Disp: 90 tablet Rfl: 0   glimepiride 2 MG Oral Tab Take 1 tablet (2 mg total) by mo is no tenderness. There is no rebound and no guarding. Musculoskeletal: He exhibits no edema. Lymphadenopathy:     He has no cervical adenopathy. Skin: He is not diaphoretic.               ASSESSMENT/PLAN:   (E11.21,  E11.65) Uncontrolled type 2 diabe

## 2018-03-31 PROBLEM — IMO0002 UNCONTROLLED TYPE 2 DIABETES MELLITUS WITH DIABETIC NEPHROPATHY, WITHOUT LONG-TERM CURRENT USE OF INSULIN: Status: ACTIVE | Noted: 2018-03-31

## 2018-03-31 PROBLEM — I10 ESSENTIAL HYPERTENSION: Status: ACTIVE | Noted: 2018-03-31

## 2018-03-31 PROBLEM — E11.65 UNCONTROLLED TYPE 2 DIABETES MELLITUS WITH DIABETIC NEPHROPATHY, WITHOUT LONG-TERM CURRENT USE OF INSULIN (HCC): Status: ACTIVE | Noted: 2018-03-31

## 2018-03-31 PROBLEM — E11.21 UNCONTROLLED TYPE 2 DIABETES MELLITUS WITH DIABETIC NEPHROPATHY, WITHOUT LONG-TERM CURRENT USE OF INSULIN (HCC): Status: ACTIVE | Noted: 2018-03-31

## 2018-03-31 PROBLEM — I51.89 DIASTOLIC DYSFUNCTION WITHOUT HEART FAILURE: Status: ACTIVE | Noted: 2018-03-31

## 2018-03-31 RX ORDER — ATORVASTATIN CALCIUM 40 MG/1
40 TABLET, FILM COATED ORAL NIGHTLY
Qty: 90 TABLET | Refills: 1 | Status: SHIPPED | OUTPATIENT
Start: 2018-03-31 | End: 2018-07-18

## 2018-04-04 ENCOUNTER — TELEPHONE (OUTPATIENT)
Dept: INTERNAL MEDICINE CLINIC | Facility: CLINIC | Age: 53
End: 2018-04-04

## 2018-04-04 ENCOUNTER — OFFICE VISIT (OUTPATIENT)
Dept: CARDIOLOGY CLINIC | Facility: CLINIC | Age: 53
End: 2018-04-04

## 2018-04-04 VITALS
RESPIRATION RATE: 12 BRPM | WEIGHT: 202 LBS | HEART RATE: 88 BPM | SYSTOLIC BLOOD PRESSURE: 150 MMHG | BODY MASS INDEX: 31 KG/M2 | DIASTOLIC BLOOD PRESSURE: 90 MMHG

## 2018-04-04 DIAGNOSIS — I10 ESSENTIAL HYPERTENSION: Primary | ICD-10-CM

## 2018-04-04 DIAGNOSIS — E08.00 DIABETES MELLITUS DUE TO UNDERLYING CONDITION WITH HYPEROSMOLARITY WITHOUT COMA, WITHOUT LONG-TERM CURRENT USE OF INSULIN (HCC): ICD-10-CM

## 2018-04-04 DIAGNOSIS — Z02.4 ENCOUNTER FOR CDL (COMMERCIAL DRIVING LICENSE) EXAM: ICD-10-CM

## 2018-04-04 PROCEDURE — 99204 OFFICE O/P NEW MOD 45 MIN: CPT | Performed by: INTERNAL MEDICINE

## 2018-04-04 PROCEDURE — 93005 ELECTROCARDIOGRAM TRACING: CPT | Performed by: INTERNAL MEDICINE

## 2018-04-04 PROCEDURE — 99212 OFFICE O/P EST SF 10 MIN: CPT | Performed by: INTERNAL MEDICINE

## 2018-04-04 PROCEDURE — 93010 ELECTROCARDIOGRAM REPORT: CPT | Performed by: INTERNAL MEDICINE

## 2018-04-04 NOTE — TELEPHONE ENCOUNTER
Pt called in requesting to have a DOT letter faxed to the [facility] fax: [phone], office: [phone]. Please advise pt when this letter has been sent.

## 2018-04-04 NOTE — PROGRESS NOTES
Meena Boothe is a 50year old male. HPI:   Patient is here for a new patient appointment. He has long-standing history of hypertension and diabetes which has not been well controlled. 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   He denies any symptoms but d rashes  RESPIRATORY: denies shortness of breath with exertion  CARDIOVASCULAR: denies chest pain on exertion  GI: denies abdominal pain and denies heartburn  NEURO: denies headaches  All other systems reviewed and negative.   EXAM:   /90   Pulse 88

## 2018-04-04 NOTE — PATIENT INSTRUCTIONS
Follow low-sodium diet as discussed. Schedule a sleep study and a stress echocardiogram prior to next visit. Follow-up with me in 3 months or sooner if needed.

## 2018-04-05 ENCOUNTER — TELEPHONE (OUTPATIENT)
Dept: CARDIOLOGY CLINIC | Facility: CLINIC | Age: 53
End: 2018-04-05

## 2018-04-05 NOTE — TELEPHONE ENCOUNTER
Pt called in to f/u on his request.  Pt states that he needs this letter in order for him to go back to work.   Please advise

## 2018-04-06 ENCOUNTER — TELEPHONE (OUTPATIENT)
Dept: INTERNAL MEDICINE CLINIC | Facility: CLINIC | Age: 53
End: 2018-04-06

## 2018-04-07 NOTE — TELEPHONE ENCOUNTER
Phone call to patient related to DOT Medical Clearance Request for Information Form.  Explained to patient he needs to complete the portion of the form giving his authorization to disclose the information to [facility] who are doing his DOT p

## 2018-04-07 NOTE — TELEPHONE ENCOUNTER
Letter was created, printed and faxed on Thursday as pt requested to [facility], fax confirmed.  Pt was informed

## 2018-04-09 NOTE — TELEPHONE ENCOUNTER
Spoke to [facility] [employer] re prior auth for stress echo.  Martin from [facility] states patient does not need a prior [facility] HNQ#[id number]

## 2018-05-05 RX ORDER — AMLODIPINE BESYLATE 5 MG/1
5 TABLET ORAL DAILY
Qty: 90 TABLET | Refills: 1 | Status: CANCELLED
Start: 2018-05-05 | End: 2019-04-30

## 2018-05-05 RX ORDER — ATORVASTATIN CALCIUM 40 MG/1
40 TABLET, FILM COATED ORAL NIGHTLY
Qty: 90 TABLET | Refills: 1 | Status: CANCELLED
Start: 2018-05-05

## 2018-05-07 ENCOUNTER — PATIENT MESSAGE (OUTPATIENT)
Dept: INTERNAL MEDICINE CLINIC | Facility: CLINIC | Age: 53
End: 2018-05-07

## 2018-05-08 NOTE — TELEPHONE ENCOUNTER
Dr. Mateus Snyder, pt [facility] 2/2/18 pt pharmacy requesting refill on sildenafil citrate if you agree please review and sign med, thank you. I copied and pasted part of your last note below.     ASSESSMENT/PLAN  Erectile dysfunction due to arterial insufficiency  ([place]

## 2018-05-08 NOTE — TELEPHONE ENCOUNTER
From: Cinthya Morris  Sent: 5/5/2018 3:50 PM CDT  Subject: Medication Renewal Request    Gorge Alejandre would like a refill of the following medications:     Sildenafil Citrate 20 MG Oral Tab Patricio Andrews MD]    Preferred pharmacy: [address] [place] [address] [id number]

## 2018-05-08 NOTE — TELEPHONE ENCOUNTER
From: Nazia Rosales  To: Lena Montez MD  Sent: 5/7/2018 5:51 PM CDT  Subject: Prescription Question    I tried to refill these and I cannot . I put in a request and I got this message in return . The following prescription(s) renewals have been de

## 2018-05-10 RX ORDER — SILDENAFIL CITRATE 20 MG/1
20 TABLET ORAL AS NEEDED
Qty: 20 TABLET | Refills: 11 | Status: SHIPPED
Start: 2018-05-10 | End: 2018-06-07

## 2018-06-09 NOTE — TELEPHONE ENCOUNTER
Failed per nursing protocol - please advise on refill request     Diabetes Medications  Protocol Criteria:  · Appointment scheduled in the past 6 months or the next 3 months  · A1C < 7.5 in the past 6 months  · Creatinine in the past 12 months  · Creatini

## 2018-06-10 RX ORDER — GLIMEPIRIDE 2 MG/1
2 TABLET ORAL
Qty: 90 TABLET | Refills: 0 | Status: SHIPPED
Start: 2018-06-10 | End: 2018-07-18

## 2018-06-10 RX ORDER — METFORMIN HYDROCHLORIDE 500 MG/1
1000 TABLET, EXTENDED RELEASE ORAL 2 TIMES DAILY WITH MEALS
Qty: 360 TABLET | Refills: 0 | Status: SHIPPED
Start: 2018-06-10 | End: 2019-01-06

## 2018-06-11 RX ORDER — SILDENAFIL CITRATE 20 MG/1
20 TABLET ORAL AS NEEDED
Qty: 20 TABLET | Refills: 11 | Status: SHIPPED
Start: 2018-06-11 | End: 2018-08-24

## 2018-06-11 RX ORDER — GLIMEPIRIDE 2 MG/1
2 TABLET ORAL
Qty: 90 TABLET | Refills: 0 | Status: SHIPPED | OUTPATIENT
Start: 2018-06-11 | End: 2018-06-22

## 2018-06-11 NOTE — TELEPHONE ENCOUNTER
From: Samy Ricketts  Sent: 6/7/2018 7:22 PM CDT  Subject: Medication Renewal Request    Gorge Alejandre would like a refill of the following medications:     Sildenafil Citrate 20 MG Oral Tab Rachel Gomez MD]    Preferred pharmacy: [address] [place] [address]

## 2018-06-11 NOTE — TELEPHONE ENCOUNTER
Dr. Reyna Simeon, pt contacting us through my chart for refill on sildenafil if you agree please review and sign med. I copied and pasted part of your last note below.     ASSESSMENT/PLAN  Erectile dysfunction due to arterial insufficiency  (primary encounter ayah

## 2018-06-21 ENCOUNTER — NURSE TRIAGE (OUTPATIENT)
Dept: OTHER | Age: 53
End: 2018-06-21

## 2018-06-21 NOTE — TELEPHONE ENCOUNTER
Action Requested: Summary for Provider     []  Critical Lab, Recommendations Needed  [x] Need Additional Advice  []   FYI    []   Need Orders  [] Need Medications Sent to Pharmacy  []  Other     SUMMARY: pt has appt scheduled 6/26/18 but requesting expedit

## 2018-06-21 NOTE — TELEPHONE ENCOUNTER
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 Appt scheduled for 8:15 am tomorrow. Please advise if this is not recommended, or close encounter if appropriate.

## 2018-06-22 ENCOUNTER — APPOINTMENT (OUTPATIENT)
Dept: LAB | Age: 53
End: 2018-06-22
Attending: INTERNAL MEDICINE
Payer: COMMERCIAL

## 2018-06-22 ENCOUNTER — OFFICE VISIT (OUTPATIENT)
Dept: INTERNAL MEDICINE CLINIC | Facility: CLINIC | Age: 53
End: 2018-06-22

## 2018-06-22 VITALS
WEIGHT: 204 LBS | HEIGHT: 68 IN | BODY MASS INDEX: 30.92 KG/M2 | DIASTOLIC BLOOD PRESSURE: 91 MMHG | TEMPERATURE: 96 F | RESPIRATION RATE: 12 BRPM | SYSTOLIC BLOOD PRESSURE: 143 MMHG | HEART RATE: 82 BPM

## 2018-06-22 DIAGNOSIS — I10 ESSENTIAL HYPERTENSION: ICD-10-CM

## 2018-06-22 DIAGNOSIS — E11.65 UNCONTROLLED TYPE 2 DIABETES MELLITUS WITH DIABETIC NEPHROPATHY, WITHOUT LONG-TERM CURRENT USE OF INSULIN (HCC): Primary | ICD-10-CM

## 2018-06-22 DIAGNOSIS — E11.21 UNCONTROLLED TYPE 2 DIABETES MELLITUS WITH DIABETIC NEPHROPATHY, WITHOUT LONG-TERM CURRENT USE OF INSULIN (HCC): Primary | ICD-10-CM

## 2018-06-22 DIAGNOSIS — E11.21 UNCONTROLLED TYPE 2 DIABETES MELLITUS WITH DIABETIC NEPHROPATHY, WITHOUT LONG-TERM CURRENT USE OF INSULIN (HCC): ICD-10-CM

## 2018-06-22 DIAGNOSIS — E11.65 UNCONTROLLED TYPE 2 DIABETES MELLITUS WITH DIABETIC NEPHROPATHY, WITHOUT LONG-TERM CURRENT USE OF INSULIN (HCC): ICD-10-CM

## 2018-06-22 PROCEDURE — 99214 OFFICE O/P EST MOD 30 MIN: CPT | Performed by: INTERNAL MEDICINE

## 2018-06-22 PROCEDURE — 83036 HEMOGLOBIN GLYCOSYLATED A1C: CPT

## 2018-06-22 PROCEDURE — 36415 COLL VENOUS BLD VENIPUNCTURE: CPT

## 2018-06-22 PROCEDURE — 99212 OFFICE O/P EST SF 10 MIN: CPT | Performed by: INTERNAL MEDICINE

## 2018-06-22 NOTE — PROGRESS NOTES
HPI:    Patient ID: Cinthya Morris is a 50year old male. Diabetes   He presents for his follow-up diabetic visit. He has type 2 diabetes mellitus. His disease course has been fluctuating. There are no hypoglycemic associated symptoms.  There are no diabe no history of chronic renal disease or a hypertension causing med. Review of Systems   Constitutional: Negative. HENT: Negative. Eyes: Negative. Respiratory: Negative. Negative for shortness of breath. Cardiovascular: Negative.   Negative exhibits no discharge. No scleral icterus. Neck: Normal range of motion. Neck supple. No JVD present. No thyromegaly present. Cardiovascular: Normal rate, regular rhythm, normal heart sounds and intact distal pulses. No murmur heard.   Pulmonary/Ches

## 2018-07-18 RX ORDER — SILDENAFIL CITRATE 20 MG/1
20 TABLET ORAL AS NEEDED
Qty: 20 TABLET | Refills: 11
Start: 2018-07-18

## 2018-07-18 NOTE — TELEPHONE ENCOUNTER
From: Libby Neville  Sent: 7/18/2018 11:37 AM CDT  Subject: Medication Renewal Request    Gorge Alejandre would like a refill of the following medications:      AmLODIPine Besylate 5 MG Oral Tab [Jatinder Hein MD]     Losartan Potassium-HCTZ 100-25 MG

## 2018-07-19 NOTE — TELEPHONE ENCOUNTER
From: Juan Ramon Scott  Sent: 7/18/2018 11:37 AM CDT  Subject: Medication Renewal Request    Gorge HERNANDEZ Alejandre would like a refill of the following medications:     Sildenafil Citrate 20 MG Oral Tab Zacarias Rutledge MD]    Preferred pharmacy: [address] [place] [address] [phone], [phone]        Medication renewals requested in this message routed separately: AmLODIPine Besylate 5 MG Oral Tab [Jatinder Hein MD]     Losartan Potassium-HCTZ 100-25 MG Oral Tab [Jatinder Hein MD]   Patient Comment: Also need a up date on dosage.  Thanks     atorvastatin 40 MG Oral Tab [Jatinder Hein MD]     glimepiride 2 MG Oral Tab [Jatinder Hein MD]

## 2018-07-20 RX ORDER — AMLODIPINE BESYLATE 5 MG/1
5 TABLET ORAL DAILY
Qty: 90 TABLET | Refills: 0 | Status: SHIPPED | OUTPATIENT
Start: 2018-07-20 | End: 2018-07-23

## 2018-07-20 RX ORDER — ATORVASTATIN CALCIUM 40 MG/1
40 TABLET, FILM COATED ORAL NIGHTLY
Qty: 90 TABLET | Refills: 0 | Status: SHIPPED | OUTPATIENT
Start: 2018-07-20 | End: 2018-10-05

## 2018-07-20 RX ORDER — LOSARTAN POTASSIUM AND HYDROCHLOROTHIAZIDE 25; 100 MG/1; MG/1
1 TABLET ORAL DAILY
Qty: 90 TABLET | Refills: 0 | Status: SHIPPED | OUTPATIENT
Start: 2018-07-20 | End: 2018-08-17 | Stop reason: SINTOL

## 2018-07-20 RX ORDER — GLIMEPIRIDE 2 MG/1
2 TABLET ORAL
Qty: 90 TABLET | Refills: 0 | Status: SHIPPED | OUTPATIENT
Start: 2018-07-20 | End: 2018-08-17 | Stop reason: DRUGHIGH

## 2018-07-21 RX ORDER — LOSARTAN POTASSIUM AND HYDROCHLOROTHIAZIDE 25; 100 MG/1; MG/1
TABLET ORAL
Qty: 90 TABLET | Refills: 0 | OUTPATIENT
Start: 2018-07-21

## 2018-07-21 NOTE — TELEPHONE ENCOUNTER
Hypertensive Medications  Protocol Criteria:  · Appointment scheduled in the past 6 months or in the next 3 months  · BMP or CMP in the past 12 months  · Creatinine result < 2  Recent Outpatient Visits            4 weeks ago Uncontrolled type 2 diabetes me diabetic nephropathy, without long-term current use of insulin [facility])    Maylin Reynaga, Len Curtis MD    Office Visit    3 months ago Essential hypertension    [facility] Christy Castillo MD    Offic ago Erectile dysfunction due to arterial insufficiency    [facility], [address], [place]    Office Visit        Future Appointments       Provider Department Appt Notes    In 2 months Sherren Roles, MD [facility]

## 2018-07-23 NOTE — TELEPHONE ENCOUNTER
Pharm  Per Patient - Has been advise by MD to take 2 tablets/day,Please send new Rx with change in directions of Qty

## 2018-07-23 NOTE — TELEPHONE ENCOUNTER
Patient stated he wast to increase his     1) Glimepiride to 4 mg daily before breakfast       Please advise.   Looks like the patient takes Glimepiride    And one of his blood pressure pills but he did not know which one   Per notes:    Per 6/22/18  OV no

## 2018-07-24 RX ORDER — AMLODIPINE BESYLATE 10 MG/1
10 TABLET ORAL DAILY
Qty: 90 TABLET | Refills: 1 | Status: SHIPPED | OUTPATIENT
Start: 2018-07-24 | End: 2018-08-24

## 2018-07-24 RX ORDER — GLIMEPIRIDE 4 MG/1
4 TABLET ORAL
Qty: 90 TABLET | Refills: 1 | Status: SHIPPED | OUTPATIENT
Start: 2018-07-24 | End: 2018-08-24

## 2018-08-14 ENCOUNTER — TELEPHONE (OUTPATIENT)
Dept: OTHER | Age: 53
End: 2018-08-14

## 2018-08-14 NOTE — TELEPHONE ENCOUNTER
Please advise. Patient stated since his dose of Losartan/HCTZ was increased his dry achy cough is worse. It is little hacks and is constant all day. His mother stated to him that is the cough her sister with her blood pressure medication.        From

## 2018-08-17 ENCOUNTER — OFFICE VISIT (OUTPATIENT)
Dept: INTERNAL MEDICINE CLINIC | Facility: CLINIC | Age: 53
End: 2018-08-17
Payer: COMMERCIAL

## 2018-08-17 ENCOUNTER — HOSPITAL ENCOUNTER (OUTPATIENT)
Dept: GENERAL RADIOLOGY | Age: 53
Discharge: HOME | End: 2018-08-17
Attending: INTERNAL MEDICINE
Payer: COMMERCIAL

## 2018-08-17 VITALS
TEMPERATURE: 97 F | WEIGHT: 201 LBS | BODY MASS INDEX: 30.46 KG/M2 | RESPIRATION RATE: 12 BRPM | DIASTOLIC BLOOD PRESSURE: 80 MMHG | SYSTOLIC BLOOD PRESSURE: 127 MMHG | HEART RATE: 93 BPM | HEIGHT: 68 IN

## 2018-08-17 DIAGNOSIS — R05.9 COUGH: Primary | ICD-10-CM

## 2018-08-17 DIAGNOSIS — R05.9 COUGH: ICD-10-CM

## 2018-08-17 PROCEDURE — 71046 X-RAY EXAM CHEST 2 VIEWS: CPT | Performed by: INTERNAL MEDICINE

## 2018-08-17 PROCEDURE — 99214 OFFICE O/P EST MOD 30 MIN: CPT | Performed by: INTERNAL MEDICINE

## 2018-08-17 PROCEDURE — 99212 OFFICE O/P EST SF 10 MIN: CPT | Performed by: INTERNAL MEDICINE

## 2018-08-17 RX ORDER — HYDROCHLOROTHIAZIDE 25 MG/1
25 TABLET ORAL DAILY
Qty: 30 TABLET | Refills: 0 | Status: SHIPPED | OUTPATIENT
Start: 2018-08-17 | End: 2018-08-24

## 2018-08-17 RX ORDER — METOPROLOL SUCCINATE 50 MG/1
50 TABLET, EXTENDED RELEASE ORAL DAILY
Qty: 30 TABLET | Refills: 0 | Status: SHIPPED | OUTPATIENT
Start: 2018-08-17 | End: 2018-08-24

## 2018-08-17 NOTE — PROGRESS NOTES
HPI:    Patient ID: Nando Saucedo is a 50year old male. Cough   This is a new problem. The current episode started more than 1 month ago. The problem occurs every few hours. The cough is non-productive.  Pertinent negatives include no fever, hemoptysis, normal.   Nose: Nose normal.   Mouth/Throat: Oropharynx is clear and moist. No oropharyngeal exudate. Eyes: Conjunctivae are normal. Pupils are equal, round, and reactive to light. Right eye exhibits no discharge. Left eye exhibits no discharge.  No scler

## 2018-08-24 RX ORDER — HYDROCHLOROTHIAZIDE 25 MG/1
25 TABLET ORAL DAILY
Qty: 90 TABLET | Refills: 0 | Status: SHIPPED | OUTPATIENT
Start: 2018-08-24 | End: 2018-10-05

## 2018-08-24 RX ORDER — AMLODIPINE BESYLATE 10 MG/1
10 TABLET ORAL DAILY
Qty: 90 TABLET | Refills: 0 | Status: SHIPPED | OUTPATIENT
Start: 2018-08-24 | End: 2018-10-05

## 2018-08-24 RX ORDER — SILDENAFIL CITRATE 20 MG/1
20 TABLET ORAL AS NEEDED
Qty: 20 TABLET | Refills: 11 | Status: SHIPPED
Start: 2018-08-24 | End: 2021-07-30 | Stop reason: ALTCHOICE

## 2018-08-24 RX ORDER — METOPROLOL SUCCINATE 50 MG/1
50 TABLET, EXTENDED RELEASE ORAL DAILY
Qty: 90 TABLET | Refills: 0 | Status: SHIPPED | OUTPATIENT
Start: 2018-08-24 | End: 2018-10-05

## 2018-08-24 RX ORDER — GLIMEPIRIDE 4 MG/1
4 TABLET ORAL
Qty: 90 TABLET | Refills: 0 | Status: SHIPPED | OUTPATIENT
Start: 2018-08-24 | End: 2018-10-05

## 2018-08-24 NOTE — TELEPHONE ENCOUNTER
From: Nikia Sewell  Sent: 8/24/2018 9:54 AM CDT  Subject: Medication Renewal Request    Gorge HERNANDEZ Alejandre would like a refill of the following medications:     Sildenafil Citrate 20 MG Oral Tab Gentry Lopez MD]    Preferred pharmacy: [address] [place] [address] [phone], [phone]        Medication renewals requested in this message routed separately:      AmLODIPine Besylate 10 MG Oral Tab [Jatinder Hein MD]     glimepiride 4 MG Oral Tab [Jatinder Hein MD]     Metoprolol Succinate ER 50 MG Oral Tablet 24 Hr [Jatinder Hein MD]     hydrochlorothiazide 25 MG Oral Tab [Jatinder Hein MD]

## 2018-08-25 NOTE — TELEPHONE ENCOUNTER
Hypertensive Medications  Protocol Criteria:  · Appointment scheduled in the past 6 months or in the next 3 months  · BMP or CMP in the past 12 months  · Creatinine result < 2  Recent Outpatient Visits            1 week ago Cough    [facility] ago Uncontrolled type 2 diabetes mellitus with diabetic nephropathy, without long-term current use of insulin [facility])    [facility], [address], Miguel Latham MD    Office Visit    4 months ago Essential hypertension    [facility]

## 2018-09-14 ENCOUNTER — PATIENT MESSAGE (OUTPATIENT)
Dept: INTERNAL MEDICINE CLINIC | Facility: CLINIC | Age: 53
End: 2018-09-14

## 2018-09-14 ENCOUNTER — NURSE TRIAGE (OUTPATIENT)
Dept: OTHER | Age: 53
End: 2018-09-14

## 2018-09-14 NOTE — TELEPHONE ENCOUNTER
[facility]        To: EM MAY Amanda Se      From: Jeremy Keen      Created: 9/14/2018 12:38 AM        *-*-*This message has not been handled. *-*-*    Hi doctor. I have a very bad sinus infection.  I would like to know what I can take to g

## 2018-09-14 NOTE — TELEPHONE ENCOUNTER
From: Aditya Khan  To: Claudine Cadena MD  Sent: 9/14/2018 12:38 AM CDT  Subject: Other    Hi doctor. I have a very bad sinus infection. I would like to know what I can take to get ride of it.

## 2018-09-15 NOTE — TELEPHONE ENCOUNTER
Please reply to pool: EM RN TRIAGE    Action Requested: Summary for Provider     []  Critical Lab, Recommendations Needed  [] Need Additional Advice  [x]   FYI    []   Need Orders  [] Need Medications Sent to Pharmacy  []  Other     SUMMARY: Declines OV to

## 2018-09-21 ENCOUNTER — OFFICE VISIT (OUTPATIENT)
Dept: INTERNAL MEDICINE CLINIC | Facility: CLINIC | Age: 53
End: 2018-09-21
Payer: COMMERCIAL

## 2018-09-21 VITALS
BODY MASS INDEX: 29.9 KG/M2 | DIASTOLIC BLOOD PRESSURE: 104 MMHG | TEMPERATURE: 97 F | SYSTOLIC BLOOD PRESSURE: 158 MMHG | WEIGHT: 197.31 LBS | HEART RATE: 96 BPM | HEIGHT: 68 IN

## 2018-09-21 DIAGNOSIS — J01.90 ACUTE NON-RECURRENT SINUSITIS, UNSPECIFIED LOCATION: Primary | ICD-10-CM

## 2018-09-21 DIAGNOSIS — Z12.11 COLON CANCER SCREENING: ICD-10-CM

## 2018-09-21 DIAGNOSIS — I10 ESSENTIAL HYPERTENSION: ICD-10-CM

## 2018-09-21 DIAGNOSIS — E78.00 HYPERCHOLESTEREMIA: ICD-10-CM

## 2018-09-21 DIAGNOSIS — R80.9 PROTEINURIA, UNSPECIFIED TYPE: ICD-10-CM

## 2018-09-21 DIAGNOSIS — E11.21 CONTROLLED TYPE 2 DIABETES MELLITUS WITH DIABETIC NEPHROPATHY, WITHOUT LONG-TERM CURRENT USE OF INSULIN (HCC): ICD-10-CM

## 2018-09-21 DIAGNOSIS — Z12.5 PROSTATE CANCER SCREENING: ICD-10-CM

## 2018-09-21 PROBLEM — IMO0001 UNCONTROLLED DIABETES MELLITUS TYPE 2 WITHOUT COMPLICATIONS: Status: RESOLVED | Noted: 2017-12-22 | Resolved: 2018-09-21

## 2018-09-21 PROCEDURE — 99214 OFFICE O/P EST MOD 30 MIN: CPT | Performed by: INTERNAL MEDICINE

## 2018-09-21 PROCEDURE — 90471 IMMUNIZATION ADMIN: CPT | Performed by: INTERNAL MEDICINE

## 2018-09-21 PROCEDURE — 99212 OFFICE O/P EST SF 10 MIN: CPT | Performed by: INTERNAL MEDICINE

## 2018-09-21 PROCEDURE — 90686 IIV4 VACC NO PRSV 0.5 ML IM: CPT | Performed by: INTERNAL MEDICINE

## 2018-09-21 NOTE — PROGRESS NOTES
HPI:    Patient ID: Jeremy Keen is a 50year old male. Sinus Problem   This is a new problem. The current episode started in the past 7 days. The problem is unchanged. There has been no fever. The pain is moderate.  Associated symptoms include congesti current treatment provides significant improvement. There are no compliance problems. There is no history of angina, CAD/MI, CVA, heart failure, PVD or retinopathy. There is no history of chronic renal disease or a hypertension causing med.    Hyperlipidem SOFTCLIX LANCETS Does not apply Misc  Disp:  Rfl:    Blood Glucose Monitoring Suppl (ACCU-CHEK NAM PLUS) w/Device Does not apply Kit  Disp:  Rfl:    aspirin 81 MG Oral Tab Take 81 mg by mouth daily.  Disp:  Rfl:      Allergies:  Lisinopril              Co Controlled type 2 diabetes mellitus with diabetic nephropathy, without long-term current use of insulin (HCC)  Plan: HEMOGLOBIN A1C, MICROALB/CREAT RATIO, RANDOM         URINE, URINALYSIS, ROUTINE, OPHTHALMOLOGY -         INTERNAL        Check a1c and ua;

## 2018-10-06 RX ORDER — AMLODIPINE BESYLATE 10 MG/1
10 TABLET ORAL DAILY
Qty: 90 TABLET | Refills: 0 | Status: SHIPPED | OUTPATIENT
Start: 2018-10-06 | End: 2019-02-11

## 2018-10-06 RX ORDER — METOPROLOL SUCCINATE 50 MG/1
50 TABLET, EXTENDED RELEASE ORAL DAILY
Qty: 90 TABLET | Refills: 0 | Status: SHIPPED | OUTPATIENT
Start: 2018-10-06 | End: 2019-02-11

## 2018-10-06 RX ORDER — HYDROCHLOROTHIAZIDE 25 MG/1
25 TABLET ORAL DAILY
Qty: 90 TABLET | Refills: 0 | Status: SHIPPED | OUTPATIENT
Start: 2018-10-06 | End: 2019-02-11

## 2018-10-06 RX ORDER — GLIMEPIRIDE 4 MG/1
4 TABLET ORAL
Qty: 90 TABLET | Refills: 0 | Status: SHIPPED | OUTPATIENT
Start: 2018-10-06 | End: 2019-02-11

## 2018-10-06 RX ORDER — ATORVASTATIN CALCIUM 40 MG/1
40 TABLET, FILM COATED ORAL NIGHTLY
Qty: 90 TABLET | Refills: 0 | Status: SHIPPED | OUTPATIENT
Start: 2018-10-06 | End: 2019-02-11

## 2018-10-10 RX ORDER — SILDENAFIL CITRATE 20 MG/1
20 TABLET ORAL AS NEEDED
Qty: 20 TABLET | Refills: 11 | OUTPATIENT
Start: 2018-10-10

## 2019-01-06 RX ORDER — METFORMIN HYDROCHLORIDE 500 MG/1
TABLET, EXTENDED RELEASE ORAL
Qty: 360 TABLET | Refills: 0 | Status: SHIPPED | OUTPATIENT
Start: 2019-01-06 | End: 2019-07-18

## 2019-02-11 RX ORDER — ATORVASTATIN CALCIUM 40 MG/1
40 TABLET, FILM COATED ORAL NIGHTLY
Qty: 90 TABLET | Refills: 0 | Status: SHIPPED | OUTPATIENT
Start: 2019-02-11 | End: 2019-05-14

## 2019-02-11 RX ORDER — AMLODIPINE BESYLATE 10 MG/1
10 TABLET ORAL DAILY
Qty: 90 TABLET | Refills: 0 | Status: SHIPPED | OUTPATIENT
Start: 2019-02-11 | End: 2019-05-14

## 2019-02-11 RX ORDER — HYDROCHLOROTHIAZIDE 25 MG/1
25 TABLET ORAL DAILY
Qty: 90 TABLET | Refills: 0 | Status: SHIPPED | OUTPATIENT
Start: 2019-02-11 | End: 2019-05-14

## 2019-02-11 RX ORDER — METOPROLOL SUCCINATE 50 MG/1
50 TABLET, EXTENDED RELEASE ORAL DAILY
Qty: 90 TABLET | Refills: 0 | Status: SHIPPED | OUTPATIENT
Start: 2019-02-11 | End: 2019-05-14

## 2019-02-12 RX ORDER — GLIMEPIRIDE 4 MG/1
4 TABLET ORAL
Qty: 90 TABLET | Refills: 0 | Status: SHIPPED | OUTPATIENT
Start: 2019-02-12 | End: 2019-05-14

## 2019-02-12 RX ORDER — SILDENAFIL CITRATE 20 MG/1
20 TABLET ORAL AS NEEDED
Qty: 20 TABLET | Refills: 11 | OUTPATIENT
Start: 2019-02-12

## 2019-05-03 ENCOUNTER — OFFICE VISIT (OUTPATIENT)
Dept: INTERNAL MEDICINE CLINIC | Facility: CLINIC | Age: 54
End: 2019-05-03
Payer: COMMERCIAL

## 2019-05-03 ENCOUNTER — LAB ENCOUNTER (OUTPATIENT)
Dept: LAB | Age: 54
End: 2019-05-03
Attending: INTERNAL MEDICINE
Payer: COMMERCIAL

## 2019-05-03 VITALS
WEIGHT: 208 LBS | HEART RATE: 80 BPM | DIASTOLIC BLOOD PRESSURE: 84 MMHG | HEIGHT: 68 IN | SYSTOLIC BLOOD PRESSURE: 136 MMHG | TEMPERATURE: 98 F | BODY MASS INDEX: 31.52 KG/M2

## 2019-05-03 DIAGNOSIS — Z00.00 ANNUAL PHYSICAL EXAM: ICD-10-CM

## 2019-05-03 DIAGNOSIS — Z00.00 ANNUAL PHYSICAL EXAM: Primary | ICD-10-CM

## 2019-05-03 DIAGNOSIS — E78.00 HYPERCHOLESTEREMIA: ICD-10-CM

## 2019-05-03 DIAGNOSIS — E11.21 CONTROLLED TYPE 2 DIABETES MELLITUS WITH DIABETIC NEPHROPATHY, WITHOUT LONG-TERM CURRENT USE OF INSULIN (HCC): ICD-10-CM

## 2019-05-03 DIAGNOSIS — Z12.5 PROSTATE CANCER SCREENING: ICD-10-CM

## 2019-05-03 DIAGNOSIS — Z12.11 COLON CANCER SCREENING: ICD-10-CM

## 2019-05-03 DIAGNOSIS — I10 ESSENTIAL HYPERTENSION: ICD-10-CM

## 2019-05-03 DIAGNOSIS — R80.9 PROTEINURIA, UNSPECIFIED TYPE: ICD-10-CM

## 2019-05-03 DIAGNOSIS — Z13.6 SCREENING FOR ISCHEMIC HEART DISEASE: ICD-10-CM

## 2019-05-03 PROBLEM — J01.90 ACUTE NON-RECURRENT SINUSITIS: Status: RESOLVED | Noted: 2018-09-21 | Resolved: 2019-05-03

## 2019-05-03 PROBLEM — R05.9 COUGH: Status: RESOLVED | Noted: 2018-08-17 | Resolved: 2019-05-03

## 2019-05-03 PROCEDURE — 83036 HEMOGLOBIN GLYCOSYLATED A1C: CPT

## 2019-05-03 PROCEDURE — 84166 PROTEIN E-PHORESIS/URINE/CSF: CPT

## 2019-05-03 PROCEDURE — 99396 PREV VISIT EST AGE 40-64: CPT | Performed by: INTERNAL MEDICINE

## 2019-05-03 PROCEDURE — 81001 URINALYSIS AUTO W/SCOPE: CPT

## 2019-05-03 PROCEDURE — 85025 COMPLETE CBC W/AUTO DIFF WBC: CPT

## 2019-05-03 PROCEDURE — 82570 ASSAY OF URINE CREATININE: CPT | Performed by: INTERNAL MEDICINE

## 2019-05-03 PROCEDURE — 86335 IMMUNFIX E-PHORSIS/URINE/CSF: CPT

## 2019-05-03 PROCEDURE — 82043 UR ALBUMIN QUANTITATIVE: CPT | Performed by: INTERNAL MEDICINE

## 2019-05-03 PROCEDURE — 36415 COLL VENOUS BLD VENIPUNCTURE: CPT

## 2019-05-03 PROCEDURE — 80053 COMPREHEN METABOLIC PANEL: CPT

## 2019-05-03 PROCEDURE — 80061 LIPID PANEL: CPT

## 2019-05-03 RX ORDER — LANCETS
EACH MISCELLANEOUS
Qty: 100 EACH | Refills: 1 | Status: SHIPPED | OUTPATIENT
Start: 2019-05-03 | End: 2021-08-04 | Stop reason: CLARIF

## 2019-05-03 RX ORDER — BLOOD-GLUCOSE METER
EACH MISCELLANEOUS
Qty: 1 KIT | Refills: 0 | Status: SHIPPED | OUTPATIENT
Start: 2019-05-03

## 2019-05-03 NOTE — PROGRESS NOTES
HPI:    Patient ID: Nichol Cohn is a 48year old male. Patient presents today for his annual physical.  He has a known history of hypertension, hyperlipidemia as well as diabetes mellitus type 2. Review of Systems   Constitutional: Negative.     H Allergies:  Lisinopril              Coughing  Losartan                Coughing   PHYSICAL EXAM:   Physical Exam   Constitutional: He is oriented to person, place, and time. He appears well-developed and well-nourished.    HENT:   Head: Normocephalic and before.    (E11.21) Controlled type 2 diabetes mellitus with diabetic nephropathy, without long-term current use of insulin (HCC)  Plan: HEMOGLOBIN A1C, MICROALB/CREAT RATIO, RANDOM         URINE, OPHTHALMOLOGY - INTERNAL        Check his A1c and UA.   Melanie

## 2019-05-13 ENCOUNTER — TELEPHONE (OUTPATIENT)
Dept: INTERNAL MEDICINE CLINIC | Facility: CLINIC | Age: 54
End: 2019-05-13

## 2019-05-13 DIAGNOSIS — R80.9 PROTEINURIA, UNSPECIFIED TYPE: Primary | ICD-10-CM

## 2019-05-13 NOTE — TELEPHONE ENCOUNTER
Advised patient of Dr. Ronald boswell. Patient verbalized understanding and had no further questions. Generated referral order for Dr. Makenna Ballard and provided pt with phone number to schedule appointment.

## 2019-05-14 RX ORDER — SILDENAFIL CITRATE 20 MG/1
20 TABLET ORAL AS NEEDED
Qty: 20 TABLET | Refills: 11 | OUTPATIENT
Start: 2019-05-14

## 2019-05-14 NOTE — TELEPHONE ENCOUNTER
Pt LOV with Dr. Danny Pace 2/2/18 pt pharmacy requesting refill on sidenafil, refill denied pt needs to be seen yearly visit.

## 2019-05-15 RX ORDER — GLIMEPIRIDE 4 MG/1
6 TABLET ORAL
Qty: 135 TABLET | Refills: 1 | Status: SHIPPED | OUTPATIENT
Start: 2019-05-15 | End: 2019-07-22

## 2019-05-15 RX ORDER — METOPROLOL SUCCINATE 50 MG/1
50 TABLET, EXTENDED RELEASE ORAL DAILY
Qty: 90 TABLET | Refills: 1 | Status: SHIPPED | OUTPATIENT
Start: 2019-05-15 | End: 2019-11-04

## 2019-05-15 RX ORDER — AMLODIPINE BESYLATE 10 MG/1
10 TABLET ORAL DAILY
Qty: 90 TABLET | Refills: 1 | Status: SHIPPED | OUTPATIENT
Start: 2019-05-15 | End: 2019-11-04

## 2019-05-15 RX ORDER — ATORVASTATIN CALCIUM 40 MG/1
40 TABLET, FILM COATED ORAL NIGHTLY
Qty: 90 TABLET | Refills: 1 | Status: SHIPPED | OUTPATIENT
Start: 2019-05-15 | End: 2019-11-08

## 2019-05-15 RX ORDER — HYDROCHLOROTHIAZIDE 25 MG/1
25 TABLET ORAL DAILY
Qty: 90 TABLET | Refills: 1 | Status: SHIPPED | OUTPATIENT
Start: 2019-05-15 | End: 2019-07-22

## 2019-06-03 ENCOUNTER — OFFICE VISIT (OUTPATIENT)
Dept: OPTOMETRY | Facility: CLINIC | Age: 54
End: 2019-06-03
Payer: COMMERCIAL

## 2019-06-03 DIAGNOSIS — E11.9 CONTROLLED TYPE 2 DIABETES MELLITUS WITHOUT COMPLICATION, WITHOUT LONG-TERM CURRENT USE OF INSULIN (HCC): Primary | ICD-10-CM

## 2019-06-03 DIAGNOSIS — H52.4 PRESBYOPIA: ICD-10-CM

## 2019-06-03 DIAGNOSIS — Z96.1 PSEUDOPHAKIA OF BOTH EYES: ICD-10-CM

## 2019-06-03 PROCEDURE — 92004 COMPRE OPH EXAM NEW PT 1/>: CPT | Performed by: OPTOMETRIST

## 2019-06-03 NOTE — PATIENT INSTRUCTIONS
Presbyopia  Patient will continue with OTC's--recommend +1.75 . Pseudophakia of both eyes  No treatment is required. Will continue to observe.     Controlled type 2 diabetes mellitus without complication, without long-term current use of insulin ([address]  I

## 2019-06-03 NOTE — PROGRESS NOTES
Cinthya Morris is a 48year old male. HPI:     HPI     Diabetic Eye Exam     Diabetes characteristics include Type 2, controlled with diet and taking oral medications. Duration of 20 years. Number of years diabetic 21. Number of years on pills 20.   Nu Monitoring Suppl (ONETOUCH ULTRA 2) w/Device Does not apply Kit Check glucose daily Disp: 1 kit Rfl: 0   Glucose Blood (ONETOUCH ULTRA BLUE) In Vitro Strip Check glucose daily Disp: 100 strip Rfl: 1   ONETOUCH ULTRASOFT LANCETS Does not apply Misc Check gl K1 K2    Right 44.50 45.25    Left 44.25 45.00            Slit Lamp and Fundus Exam     External Exam       Right Left    External Normal Normal          Slit Lamp Exam       Right Left    Lids/Lashes Normal Normal    Conjunctiva/Sclera Pinguecula Pin requested or ordered in this encounter        Follow up instructions:  Return in about 1 year (around 6/3/2020) for Dilated exam, Diabetic Eye exam.    6/3/2019  Scribed by: James Henning

## 2019-06-26 ENCOUNTER — OFFICE VISIT (OUTPATIENT)
Dept: NEPHROLOGY | Facility: CLINIC | Age: 54
End: 2019-06-26
Payer: COMMERCIAL

## 2019-06-26 VITALS
BODY MASS INDEX: 31.73 KG/M2 | HEART RATE: 76 BPM | SYSTOLIC BLOOD PRESSURE: 153 MMHG | DIASTOLIC BLOOD PRESSURE: 96 MMHG | WEIGHT: 209.38 LBS | HEIGHT: 68 IN

## 2019-06-26 DIAGNOSIS — R80.9 PROTEINURIA, UNSPECIFIED TYPE: Primary | ICD-10-CM

## 2019-06-26 DIAGNOSIS — I10 ESSENTIAL HYPERTENSION: ICD-10-CM

## 2019-06-26 PROCEDURE — 99245 OFF/OP CONSLTJ NEW/EST HI 55: CPT | Performed by: INTERNAL MEDICINE

## 2019-06-26 PROCEDURE — 99212 OFFICE O/P EST SF 10 MIN: CPT | Performed by: INTERNAL MEDICINE

## 2019-06-26 RX ORDER — VALSARTAN 80 MG/1
80 TABLET ORAL DAILY
Qty: 30 TABLET | Refills: 5 | Status: SHIPPED | OUTPATIENT
Start: 2019-06-26 | End: 2019-07-22

## 2019-06-27 NOTE — PATIENT INSTRUCTIONS
Please do labs and kidney ultrasound as ordered. We will contact you when all of your laboratory results are in. Try valsartan 80 mg once a day to help improve your blood pressure. Let me know if you develop a cough.   Check your blood pressures daily an

## 2019-06-27 NOTE — PROGRESS NOTES
06/26/19        Patient: Eliel Degree   YOB: 1965   Date of Visit: 6/26/2019       Dear  Dr. Chichi Capps MD,      Thank you for referring Eliel Manjarrez to my practice. Please find my assessment and plan below.       As you know he is a 53-y otherwise was unremarkable. Urine for Bence Flaherty was negative. I therefore informed the patient that he does have proteinuria which has been progressive. This most likely is secondary to hypertension and diabetes but other causes need to be excluded.

## 2019-07-09 ENCOUNTER — PATIENT MESSAGE (OUTPATIENT)
Dept: INTERNAL MEDICINE CLINIC | Facility: CLINIC | Age: 54
End: 2019-07-09

## 2019-07-09 ENCOUNTER — TELEPHONE (OUTPATIENT)
Dept: INTERNAL MEDICINE CLINIC | Facility: CLINIC | Age: 54
End: 2019-07-09

## 2019-07-09 NOTE — TELEPHONE ENCOUNTER
Contacted EL--wants patient back in office to see him in 2 weeks after adding Valsartan. Contacted patient and relayed EL messages--patient verbalizes understanding and agreement.     Advised patient to  Valsartan Rx as ordered by Dr. Lilia Arreguin and

## 2019-07-09 NOTE — TELEPHONE ENCOUNTER
Im ok with valsartan added by Dr Marquez Alexandre; his bp is not well controlled though based on bp readings on his last ov with Dr Marquez Alexandre

## 2019-07-09 NOTE — TELEPHONE ENCOUNTER
Dr. Leslie Bills,  Please see [address] message below    Patient is already on  :  Valsartan 80 mg daily  Amlodipine 10 mg daily  Metoprolol 50 mg daily  HCTZ 25 mg daily

## 2019-07-09 NOTE — TELEPHONE ENCOUNTER
From: Manny Galvan  To: Jose Rosado MD  Sent: 7/9/2019 10:58 AM CDT  Subject: Other    I’m doing a DOT physical. They checked my blood pressure and it is high. First check was  190/100 and the second was 180/110.  What can I do to bring this down

## 2019-07-18 ENCOUNTER — PATIENT MESSAGE (OUTPATIENT)
Dept: INTERNAL MEDICINE CLINIC | Facility: CLINIC | Age: 54
End: 2019-07-18

## 2019-07-19 RX ORDER — METFORMIN HYDROCHLORIDE 500 MG/1
TABLET, EXTENDED RELEASE ORAL
Qty: 360 TABLET | Refills: 1 | Status: SHIPPED | OUTPATIENT
Start: 2019-07-19 | End: 2019-11-08

## 2019-07-19 NOTE — TELEPHONE ENCOUNTER
Please review; protocol failed.     Requested Prescriptions     Pending Prescriptions Disp Refills   • metFORMIN HCl  MG Oral Tablet 24 Hr 360 tablet 0         Recent Visits  Date Type Provider Dept   05/03/19 Office Visit MD Robel Snow Art

## 2019-07-22 ENCOUNTER — OFFICE VISIT (OUTPATIENT)
Dept: INTERNAL MEDICINE CLINIC | Facility: CLINIC | Age: 54
End: 2019-07-22
Payer: COMMERCIAL

## 2019-07-22 ENCOUNTER — TELEPHONE (OUTPATIENT)
Dept: FAMILY MEDICINE CLINIC | Facility: CLINIC | Age: 54
End: 2019-07-22

## 2019-07-22 VITALS
WEIGHT: 210 LBS | RESPIRATION RATE: 12 BRPM | DIASTOLIC BLOOD PRESSURE: 86 MMHG | TEMPERATURE: 99 F | BODY MASS INDEX: 31.83 KG/M2 | HEART RATE: 84 BPM | SYSTOLIC BLOOD PRESSURE: 154 MMHG | HEIGHT: 68 IN

## 2019-07-22 DIAGNOSIS — I10 ESSENTIAL HYPERTENSION: Primary | ICD-10-CM

## 2019-07-22 DIAGNOSIS — E11.65 UNCONTROLLED TYPE 2 DIABETES MELLITUS WITH DIABETIC NEPHROPATHY, WITHOUT LONG-TERM CURRENT USE OF INSULIN (HCC): ICD-10-CM

## 2019-07-22 DIAGNOSIS — E11.21 UNCONTROLLED TYPE 2 DIABETES MELLITUS WITH DIABETIC NEPHROPATHY, WITHOUT LONG-TERM CURRENT USE OF INSULIN (HCC): ICD-10-CM

## 2019-07-22 PROCEDURE — 99212 OFFICE O/P EST SF 10 MIN: CPT | Performed by: INTERNAL MEDICINE

## 2019-07-22 PROCEDURE — 99214 OFFICE O/P EST MOD 30 MIN: CPT | Performed by: INTERNAL MEDICINE

## 2019-07-22 RX ORDER — VALSARTAN AND HYDROCHLOROTHIAZIDE 160; 25 MG/1; MG/1
1 TABLET ORAL DAILY
Qty: 90 TABLET | Refills: 3 | Status: SHIPPED | OUTPATIENT
Start: 2019-07-22 | End: 2019-11-08

## 2019-07-22 NOTE — PROGRESS NOTES
HPI:    Patient ID: Betsy Crigler is a 48year old male. HTN   This is a chronic problem. The current episode started more than 1 month ago. The problem has been waxing and waning since onset. The problem is uncontrolled.  Pertinent negatives include no mg/dl. An ACE inhibitor/angiotensin II receptor blocker is being taken. He does not see a podiatrist.Eye exam is current. Review of Systems   Cardiovascular: Negative for chest pain. Genitourinary: Negative for impotence.             Current Outpati oriented to person, place, and time. He appears well-developed and well-nourished. No distress. HENT:   Head: Normocephalic and atraumatic.    Right Ear: External ear normal.   Left Ear: External ear normal.   Nose: Nose normal.   Mouth/Throat: Oropharynx patient potential benefits as well as side effects of the medicine. He will continue to monitor his blood sugars at home. Follow diabetic diet. We will try to get his weight down. No orders of the defined types were placed in this encounter.

## 2019-07-23 NOTE — TELEPHONE ENCOUNTER
[employer] Dis[employer] form for Dr. Dulce Grimes received in Forms dept+ FCR+ Signed release. Logged for processing.  NK

## 2019-07-25 ENCOUNTER — TELEPHONE (OUTPATIENT)
Dept: FAMILY MEDICINE CLINIC | Facility: CLINIC | Age: 54
End: 2019-07-25

## 2019-07-25 NOTE — TELEPHONE ENCOUNTER
Called [employer] and C sheryl rep [employer] to obtain insurance information. Rep transferred call to [employer] [phone] to initiate the PA for Jardiance 10 mg tab.  Spoke with rep Jessee Gonzalez who states to call back tomorrow to initiate a PA, their department closes at 5:30 pm.

## 2019-07-26 NOTE — TELEPHONE ENCOUNTER
Contacted [employer] spoke with rep Alfred to initiate the PA for Jardiance 10 mg tab. Rep transferred call to [facility] who states medication is a plan exclusion, it is not covered. Covered drugs are Amaryl, glyburide, Actos, and metformin. Please advise.

## 2019-07-29 NOTE — TELEPHONE ENCOUNTER
Notify pt; his insurance doesn't cover jardiance and it's class. He would need to go back to glimperide which is still covered by his insurance.

## 2019-07-29 NOTE — TELEPHONE ENCOUNTER
S/W patient and related Dr. Holly Asencio message that Yaquelin Blank or meds in  It's class are not covered by his insurance ; but, Glimepiride is. Patient states that fine and ok with him. Message fwd to Dr. Nely Finley to update.

## 2019-07-29 NOTE — TELEPHONE ENCOUNTER
Dr. Yobany Batres for Hypertension starting from 7/10/19 til pending next eval  Please sign off on form:  -Highlight the patient and hit \"Chart\" button.   -In Chart Review, w/in the Encounter tab - click 1 time on the Telephone call encounter for 7

## 2019-07-30 NOTE — TELEPHONE ENCOUNTER
Faxed disab form to [facility] - [phone]. Mailed copies to pt. Notified via [facility] [address].

## 2019-08-13 ENCOUNTER — OFFICE VISIT (OUTPATIENT)
Dept: INTERNAL MEDICINE CLINIC | Facility: CLINIC | Age: 54
End: 2019-08-13
Payer: COMMERCIAL

## 2019-08-13 VITALS
HEART RATE: 87 BPM | WEIGHT: 207.63 LBS | SYSTOLIC BLOOD PRESSURE: 142 MMHG | DIASTOLIC BLOOD PRESSURE: 94 MMHG | BODY MASS INDEX: 32 KG/M2 | TEMPERATURE: 99 F

## 2019-08-13 DIAGNOSIS — I10 ESSENTIAL HYPERTENSION: Primary | ICD-10-CM

## 2019-08-13 PROCEDURE — 99214 OFFICE O/P EST MOD 30 MIN: CPT | Performed by: INTERNAL MEDICINE

## 2019-08-13 PROCEDURE — 99212 OFFICE O/P EST SF 10 MIN: CPT | Performed by: INTERNAL MEDICINE

## 2019-08-14 NOTE — PROGRESS NOTES
HPI:    Patient ID: Samy Ricketts is a 48year old male. Hypertension   This is a chronic problem. The current episode started more than 1 year ago. The problem has been gradually improving since onset. The problem is uncontrolled.  Pertinent negatives i Tab Take 1 tablet (20 mg total) by mouth as needed.  Take up to 5 tablets together 1-2 hours prior to sexual intercourse Disp: 20 tablet Rfl: 11   Glucose Blood (ACCU-CHEK NAM PLUS) In Vitro Strip  Disp:  Rfl:    ACCU-CHEK SOFTCLIX LANCETS Does not apply slowly but still not at goal. We will increase his metoprolol ER to 100mg po daily and continue other bp meds. He will continue to monitor bp at home and will RTC in 4 weeks if bp remains above goal of 130/80. Pt agreed.          No orders of the defined ty

## 2019-08-27 RX ORDER — SILDENAFIL CITRATE 20 MG/1
20 TABLET ORAL AS NEEDED
Qty: 20 TABLET | Refills: 11 | OUTPATIENT
Start: 2019-08-27

## 2019-08-27 NOTE — TELEPHONE ENCOUNTER
Pt LOV with Dr. Lizbeth Bates 2/2/18 pt pharmacy requesting refill on sildenafil, pt does not have upcoming emily refill denied. Pt needs to make emily. No orders of the defined types were placed in this encounter. Reviewed findings at length with patient.   Recom

## 2019-10-03 RX ORDER — SILDENAFIL CITRATE 20 MG/1
20 TABLET ORAL AS NEEDED
Qty: 20 TABLET | Refills: 11 | OUTPATIENT
Start: 2019-10-03

## 2019-10-03 NOTE — TELEPHONE ENCOUNTER
Pt LOV with Dr. Murray Chen 2/2/18 pt pharmacy requesting refill on sildenafil. Refill denied pt needs to make emily.

## 2019-10-24 ENCOUNTER — IMMUNIZATION (OUTPATIENT)
Dept: INTERNAL MEDICINE CLINIC | Facility: CLINIC | Age: 54
End: 2019-10-24
Payer: MEDICAID

## 2019-10-24 DIAGNOSIS — Z23 NEED FOR VACCINATION: ICD-10-CM

## 2019-10-24 PROCEDURE — 90686 IIV4 VACC NO PRSV 0.5 ML IM: CPT | Performed by: INTERNAL MEDICINE

## 2019-10-24 PROCEDURE — 90471 IMMUNIZATION ADMIN: CPT | Performed by: INTERNAL MEDICINE

## 2019-11-06 RX ORDER — METOPROLOL SUCCINATE 50 MG/1
50 TABLET, EXTENDED RELEASE ORAL DAILY
Qty: 90 TABLET | Refills: 1 | Status: SHIPPED | OUTPATIENT
Start: 2019-11-06 | End: 2020-06-04

## 2019-11-06 RX ORDER — METFORMIN HYDROCHLORIDE 500 MG/1
TABLET, EXTENDED RELEASE ORAL
Qty: 360 TABLET | Refills: 1 | OUTPATIENT
Start: 2019-11-06

## 2019-11-06 RX ORDER — ATORVASTATIN CALCIUM 40 MG/1
40 TABLET, FILM COATED ORAL NIGHTLY
Qty: 90 TABLET | Refills: 1 | OUTPATIENT
Start: 2019-11-06

## 2019-11-06 RX ORDER — VALSARTAN AND HYDROCHLOROTHIAZIDE 160; 25 MG/1; MG/1
1 TABLET ORAL DAILY
Qty: 90 TABLET | Refills: 1 | OUTPATIENT
Start: 2019-11-06 | End: 2020-10-31

## 2019-11-06 RX ORDER — AMLODIPINE BESYLATE 10 MG/1
10 TABLET ORAL DAILY
Qty: 90 TABLET | Refills: 1 | Status: SHIPPED | OUTPATIENT
Start: 2019-11-06 | End: 2020-05-04

## 2019-11-06 NOTE — TELEPHONE ENCOUNTER
Dr. Carlisle Begun on call for Dr. Brenden Ayon:    Please review; protocol failed. Patient passes protocol, but please review warning below and advise. Script pended. Allergy/Contraindication: Valsartan-hydroCHLOROthiazide   Reactions: Coughing.  No reaction BUN 16 05/03/2019    CREATSERUM 1.26 05/03/2019    BUNCREA 12.7 05/03/2019    GFRNAA 66 05/03/2019    GFRAA 76 05/03/2019    CA 8.6 05/03/2019    AST 17 05/03/2019    ALT 17 05/03/2019    BILT 0.8 05/03/2019    TP 7.5 05/03/2019    ALB 3.5 05/03/2019    N

## 2019-11-07 ENCOUNTER — OFFICE VISIT (OUTPATIENT)
Dept: INTERNAL MEDICINE CLINIC | Facility: CLINIC | Age: 54
End: 2019-11-07
Payer: MEDICAID

## 2019-11-07 VITALS
HEART RATE: 101 BPM | WEIGHT: 198 LBS | SYSTOLIC BLOOD PRESSURE: 130 MMHG | DIASTOLIC BLOOD PRESSURE: 80 MMHG | BODY MASS INDEX: 30.01 KG/M2 | HEIGHT: 68 IN | TEMPERATURE: 98 F

## 2019-11-07 DIAGNOSIS — I10 ESSENTIAL HYPERTENSION: ICD-10-CM

## 2019-11-07 DIAGNOSIS — E78.00 HYPERCHOLESTEREMIA: ICD-10-CM

## 2019-11-07 DIAGNOSIS — E11.21 UNCONTROLLED TYPE 2 DIABETES MELLITUS WITH DIABETIC NEPHROPATHY, WITHOUT LONG-TERM CURRENT USE OF INSULIN (HCC): Primary | ICD-10-CM

## 2019-11-07 DIAGNOSIS — E11.65 UNCONTROLLED TYPE 2 DIABETES MELLITUS WITH DIABETIC NEPHROPATHY, WITHOUT LONG-TERM CURRENT USE OF INSULIN (HCC): Primary | ICD-10-CM

## 2019-11-07 PROCEDURE — 99214 OFFICE O/P EST MOD 30 MIN: CPT | Performed by: INTERNAL MEDICINE

## 2019-11-07 RX ORDER — LANCETS
EACH MISCELLANEOUS
Qty: 100 EACH | Refills: 1 | Status: SHIPPED | OUTPATIENT
Start: 2019-11-07 | End: 2021-07-31

## 2019-11-07 RX ORDER — BLOOD-GLUCOSE METER
EACH MISCELLANEOUS
Qty: 1 KIT | Refills: 0 | Status: SHIPPED | OUTPATIENT
Start: 2019-11-07

## 2019-11-07 RX ORDER — SILDENAFIL CITRATE 20 MG/1
20 TABLET ORAL AS NEEDED
Qty: 20 TABLET | Refills: 11 | OUTPATIENT
Start: 2019-11-07

## 2019-11-07 NOTE — PROGRESS NOTES
HPI:    Patient ID: Wendie Sanchez is a 48year old male. Hypertension   This is a chronic problem. The current episode started more than 1 year ago. The problem is unchanged. The problem is controlled.  Pertinent negatives include no chest pain, peripher Hyperlipidemia   This is a chronic problem. The current episode started more than 1 year ago. The problem is controlled. Recent lipid tests were reviewed and are normal. Exacerbating diseases include diabetes.  He has no history of hypothyroidism, liver d BLUE) In Vitro Strip Check glucose daily 100 strip 1   • ONETOUCH ULTRASOFT LANCETS Does not apply Misc Check glucose daily 100 each 1   • Sildenafil Citrate 20 MG Oral Tab Take 1 tablet (20 mg total) by mouth as needed.  Take up to 5 tablets together 1-2 h hypertension  Plan: bp now controlled. Continue current bp meds.     (E78.00) Hypercholesteremia  Plan: LIPID PANEL        Check lipid panel. Continue with chol med and low chol diet. No orders of the defined types were placed in this encounter.

## 2019-11-08 RX ORDER — VALSARTAN AND HYDROCHLOROTHIAZIDE 160; 25 MG/1; MG/1
1 TABLET ORAL DAILY
Qty: 90 TABLET | Refills: 3 | Status: SHIPPED | OUTPATIENT
Start: 2019-11-08 | End: 2020-06-15

## 2019-11-08 RX ORDER — ATORVASTATIN CALCIUM 40 MG/1
40 TABLET, FILM COATED ORAL NIGHTLY
Qty: 90 TABLET | Refills: 1 | Status: SHIPPED | OUTPATIENT
Start: 2019-11-08 | End: 2020-08-07

## 2019-11-08 RX ORDER — METFORMIN HYDROCHLORIDE 500 MG/1
TABLET, EXTENDED RELEASE ORAL
Qty: 360 TABLET | Refills: 1 | Status: SHIPPED | OUTPATIENT
Start: 2019-11-08 | End: 2020-06-16

## 2020-03-27 ENCOUNTER — TELEPHONE (OUTPATIENT)
Dept: SURGERY | Facility: CLINIC | Age: 55
End: 2020-03-27

## 2020-03-27 NOTE — TELEPHONE ENCOUNTER
Current Outpatient Medications   Medication Sig Dispense Refill   •   90 tablet 1   •   360 tablet 1   •   90 tablet 3   •   1 kit 0   •   100 strip 1   •   100 each 1   •   90 tablet 1   •   90 tablet 1   •   1 kit 0   •   100 strip 1   •   100 each 1   •

## 2020-03-27 NOTE — TELEPHONE ENCOUNTER
Patient has been seen since 02/2018. Patient will need to follow up, likely in 2-3 months due to COVID 19. I would recommend he attempt to contact his pcp regarding possible refills.     Thank you,  Eren MACIAS  [facility]-Urology

## 2020-03-31 RX ORDER — SILDENAFIL CITRATE 20 MG/1
20 TABLET ORAL AS NEEDED
Qty: 20 TABLET | Refills: 11 | OUTPATIENT
Start: 2020-03-31

## 2020-03-31 NOTE — TELEPHONE ENCOUNTER
I called pt verified name/ and informed pt that we can not refill pt sildenafil he has not seen Dr. Naheed Dean in over 2 years (2018) pt made emily to see Dr. Naheed Dean in 2020, I did advise pt to contact his PCP if he has been seen to see if they will re

## 2020-04-15 NOTE — TELEPHONE ENCOUNTER
----- Message from Cyndee Alejandre sent at 4/15/2020  3:34 PM CDT -----  Regarding: Prescription Question  Contact: [phone]  This prescription renewals was denied:  - Sildenafil Citrate 20 MG Oral Tab  I talked to the office of  Shayne Mcclure MD. They

## 2020-04-15 NOTE — TELEPHONE ENCOUNTER
This is being sent to you without review by the Triage staff due to the high call volumes created by the COVID-19 virus, per the email sent by Dr. Leonel Navarrete on 3/19/20. Thank you for your support.     Centralized Nurse Triage Team

## 2020-04-17 RX ORDER — SILDENAFIL CITRATE 20 MG/1
20 TABLET ORAL AS NEEDED
Qty: 20 TABLET | Refills: 11 | OUTPATIENT
Start: 2020-04-17

## 2020-04-21 NOTE — TELEPHONE ENCOUNTER
Ilda Benítez            3/31/20 4:19 PM   The following prescription(s) renewals have been denied:          - Sildenafil Citrate 20 MG Oral Tab     If we have received multiple requests for this Medication renewal (either from yo

## 2020-05-26 ENCOUNTER — TELEPHONE (OUTPATIENT)
Dept: SURGERY | Facility: CLINIC | Age: 55
End: 2020-05-26

## 2020-05-26 NOTE — TELEPHONE ENCOUNTER
LMTCB also sent My Chart message. Due to the recent covid-19 crisis we are reaching out to patient if they would like to change visit to video visit. CATERINA's if patient calls back and agrees to video visit, please assist in changing visit type.

## 2020-06-01 ENCOUNTER — TELEPHONE (OUTPATIENT)
Dept: SURGERY | Facility: CLINIC | Age: 55
End: 2020-06-01

## 2020-06-01 ENCOUNTER — VIRTUAL PHONE E/M (OUTPATIENT)
Dept: SURGERY | Facility: CLINIC | Age: 55
End: 2020-06-01
Payer: MEDICAID

## 2020-06-01 DIAGNOSIS — N52.9 ERECTILE DYSFUNCTION, UNSPECIFIED ERECTILE DYSFUNCTION TYPE: ICD-10-CM

## 2020-06-01 DIAGNOSIS — R31.29 MICROHEMATURIA: Primary | ICD-10-CM

## 2020-06-01 PROCEDURE — 99212 OFFICE O/P EST SF 10 MIN: CPT | Performed by: UROLOGY

## 2020-06-01 RX ORDER — SILDENAFIL CITRATE 20 MG/1
20 TABLET ORAL AS NEEDED
Qty: 30 TABLET | Refills: 11 | Status: SHIPPED | OUTPATIENT
Start: 2020-06-01 | End: 2021-07-30 | Stop reason: ALTCHOICE

## 2020-06-01 NOTE — PROGRESS NOTES
Virtual Telephone Check-In    Libby Neville verbally consents to a Virtual/Telephone Check-In visit on 06/01/20. Patient has been referred to the [facility] website at [email or web address] to review the yearly Consent to Treat document.     Patient Sonny Monet

## 2020-06-04 RX ORDER — METOPROLOL SUCCINATE 50 MG/1
TABLET, EXTENDED RELEASE ORAL
Qty: 90 TABLET | Refills: 0 | Status: SHIPPED | OUTPATIENT
Start: 2020-06-04 | End: 2020-06-15

## 2020-06-09 ENCOUNTER — LAB ENCOUNTER (OUTPATIENT)
Dept: LAB | Age: 55
End: 2020-06-09
Attending: INTERNAL MEDICINE
Payer: MEDICAID

## 2020-06-09 DIAGNOSIS — R80.9 PROTEINURIA, UNSPECIFIED TYPE: ICD-10-CM

## 2020-06-09 DIAGNOSIS — E11.65 UNCONTROLLED TYPE 2 DIABETES MELLITUS WITH DIABETIC NEPHROPATHY, WITHOUT LONG-TERM CURRENT USE OF INSULIN (HCC): ICD-10-CM

## 2020-06-09 DIAGNOSIS — E78.00 HYPERCHOLESTEREMIA: ICD-10-CM

## 2020-06-09 DIAGNOSIS — E11.21 UNCONTROLLED TYPE 2 DIABETES MELLITUS WITH DIABETIC NEPHROPATHY, WITHOUT LONG-TERM CURRENT USE OF INSULIN (HCC): ICD-10-CM

## 2020-06-09 PROCEDURE — 82570 ASSAY OF URINE CREATININE: CPT

## 2020-06-09 PROCEDURE — 80069 RENAL FUNCTION PANEL: CPT

## 2020-06-09 PROCEDURE — 86160 COMPLEMENT ANTIGEN: CPT

## 2020-06-09 PROCEDURE — 86140 C-REACTIVE PROTEIN: CPT

## 2020-06-09 PROCEDURE — 85025 COMPLETE CBC W/AUTO DIFF WBC: CPT

## 2020-06-09 PROCEDURE — 83036 HEMOGLOBIN GLYCOSYLATED A1C: CPT

## 2020-06-09 PROCEDURE — 80061 LIPID PANEL: CPT

## 2020-06-09 PROCEDURE — 86038 ANTINUCLEAR ANTIBODIES: CPT

## 2020-06-09 PROCEDURE — 36415 COLL VENOUS BLD VENIPUNCTURE: CPT

## 2020-06-09 PROCEDURE — 82043 UR ALBUMIN QUANTITATIVE: CPT

## 2020-06-09 PROCEDURE — 86431 RHEUMATOID FACTOR QUANT: CPT

## 2020-06-09 PROCEDURE — 84165 PROTEIN E-PHORESIS SERUM: CPT

## 2020-06-14 ENCOUNTER — TELEPHONE (OUTPATIENT)
Dept: NEPHROLOGY | Facility: CLINIC | Age: 55
End: 2020-06-14

## 2020-06-14 DIAGNOSIS — R80.9 PROTEINURIA, UNSPECIFIED TYPE: Primary | ICD-10-CM

## 2020-06-14 NOTE — TELEPHONE ENCOUNTER
I ordered these test a year ago. Why is he doing them just now. Kidney function has gotten worse compared to a year ago. There is a possible abnormal protein in the urine.   Do serum immunofixation/immunotyping, serum immunoglobulin levels, serum free im

## 2020-06-15 ENCOUNTER — OFFICE VISIT (OUTPATIENT)
Dept: INTERNAL MEDICINE CLINIC | Facility: CLINIC | Age: 55
End: 2020-06-15
Payer: MEDICAID

## 2020-06-15 VITALS
TEMPERATURE: 97 F | BODY MASS INDEX: 29.55 KG/M2 | WEIGHT: 195 LBS | RESPIRATION RATE: 12 BRPM | HEART RATE: 87 BPM | DIASTOLIC BLOOD PRESSURE: 102 MMHG | HEIGHT: 68 IN | SYSTOLIC BLOOD PRESSURE: 152 MMHG

## 2020-06-15 DIAGNOSIS — E11.21 UNCONTROLLED TYPE 2 DIABETES MELLITUS WITH DIABETIC NEPHROPATHY, WITHOUT LONG-TERM CURRENT USE OF INSULIN (HCC): Primary | ICD-10-CM

## 2020-06-15 DIAGNOSIS — R80.9 PROTEINURIA, UNSPECIFIED TYPE: ICD-10-CM

## 2020-06-15 DIAGNOSIS — E11.65 UNCONTROLLED TYPE 2 DIABETES MELLITUS WITH DIABETIC NEPHROPATHY, WITHOUT LONG-TERM CURRENT USE OF INSULIN (HCC): Primary | ICD-10-CM

## 2020-06-15 DIAGNOSIS — I10 ESSENTIAL HYPERTENSION: ICD-10-CM

## 2020-06-15 DIAGNOSIS — Z12.11 COLON CANCER SCREENING: ICD-10-CM

## 2020-06-15 DIAGNOSIS — E78.00 HYPERCHOLESTEREMIA: ICD-10-CM

## 2020-06-15 DIAGNOSIS — I51.89 DIASTOLIC DYSFUNCTION WITHOUT HEART FAILURE: ICD-10-CM

## 2020-06-15 PROCEDURE — 99214 OFFICE O/P EST MOD 30 MIN: CPT | Performed by: INTERNAL MEDICINE

## 2020-06-15 RX ORDER — VALSARTAN AND HYDROCHLOROTHIAZIDE 160; 25 MG/1; MG/1
1 TABLET ORAL DAILY
Qty: 90 TABLET | Refills: 1 | Status: SHIPPED | OUTPATIENT
Start: 2020-06-15 | End: 2021-06-08

## 2020-06-15 RX ORDER — ATORVASTATIN CALCIUM 40 MG/1
40 TABLET, FILM COATED ORAL NIGHTLY
Qty: 90 TABLET | Refills: 1 | Status: SHIPPED | OUTPATIENT
Start: 2020-06-15 | End: 2021-04-05

## 2020-06-15 RX ORDER — GLIPIZIDE 5 MG/1
5 TABLET ORAL
Qty: 180 TABLET | Refills: 0 | Status: SHIPPED | OUTPATIENT
Start: 2020-06-15 | End: 2020-10-19

## 2020-06-15 RX ORDER — METOPROLOL SUCCINATE 50 MG/1
50 TABLET, EXTENDED RELEASE ORAL DAILY
Qty: 90 TABLET | Refills: 1 | Status: SHIPPED | OUTPATIENT
Start: 2020-06-15 | End: 2021-02-09

## 2020-06-15 NOTE — PROGRESS NOTES
HPI:    Patient ID: Sue Vallejo is a 48year old male. Diabetes   He presents for his follow-up diabetic visit. He has type 2 diabetes mellitus. His disease course has been worsening. There are no hypoglycemic associated symptoms.  There are no diabeti causes of hypertension include chronic renal disease. There is no history of a hypertension causing med. Hyperlipidemia   This is a chronic problem. The current episode started more than 1 year ago. The problem is uncontrolled.  Recent lipid tests were re • Blood Glucose Monitoring Suppl (ACCU-CHEK NAM PLUS) w/Device Does not apply Kit      • aspirin 81 MG Oral Tab Take 81 mg by mouth daily. • Sildenafil Citrate 20 MG Oral Tab Take 1 tablet (20 mg total) by mouth as needed.  Take 2 tablets up to 5 tenderness. There is no rebound and no guarding. Musculoskeletal: Normal range of motion. General: No edema.       Comments: Bilateral barefoot skin diabetic exam is normal, visualized feet and the appearance is normal.  Bilateral monofilament/sen prescriptions requested or ordered in this encounter       Imaging & Referrals:  None       #[phone]

## 2020-06-17 RX ORDER — METFORMIN HYDROCHLORIDE 500 MG/1
TABLET, EXTENDED RELEASE ORAL
Qty: 360 TABLET | Refills: 0 | Status: SHIPPED | OUTPATIENT
Start: 2020-06-17 | End: 2020-10-16

## 2020-06-17 NOTE — TELEPHONE ENCOUNTER
Spoke to patient and relayed Dr. Antonio Flowers message as shown below. Patient verbalized understanding of whole message and had no further questions at this time. Patient will be having labs done soon.

## 2020-06-22 ENCOUNTER — LAB ENCOUNTER (OUTPATIENT)
Dept: LAB | Age: 55
End: 2020-06-22
Attending: INTERNAL MEDICINE
Payer: MEDICAID

## 2020-06-22 DIAGNOSIS — R80.9 PROTEINURIA, UNSPECIFIED TYPE: ICD-10-CM

## 2020-06-22 PROCEDURE — 36415 COLL VENOUS BLD VENIPUNCTURE: CPT

## 2020-06-22 PROCEDURE — 83883 ASSAY NEPHELOMETRY NOT SPEC: CPT

## 2020-06-22 PROCEDURE — 86334 IMMUNOFIX E-PHORESIS SERUM: CPT

## 2020-06-22 PROCEDURE — 84166 PROTEIN E-PHORESIS/URINE/CSF: CPT

## 2020-06-22 PROCEDURE — 86335 IMMUNFIX E-PHORSIS/URINE/CSF: CPT

## 2020-06-22 PROCEDURE — 82785 ASSAY OF IGE: CPT

## 2020-06-23 ENCOUNTER — OFFICE VISIT (OUTPATIENT)
Dept: OPTOMETRY | Facility: CLINIC | Age: 55
End: 2020-06-23
Payer: MEDICAID

## 2020-06-23 DIAGNOSIS — H52.4 PRESBYOPIA: ICD-10-CM

## 2020-06-23 DIAGNOSIS — E11.9 CONTROLLED TYPE 2 DIABETES MELLITUS WITHOUT COMPLICATION, WITHOUT LONG-TERM CURRENT USE OF INSULIN (HCC): Primary | ICD-10-CM

## 2020-06-23 DIAGNOSIS — Z96.1 PSEUDOPHAKIA OF BOTH EYES: ICD-10-CM

## 2020-06-23 PROCEDURE — 92014 COMPRE OPH EXAM EST PT 1/>: CPT | Performed by: OPTOMETRIST

## 2020-06-23 NOTE — PROGRESS NOTES
Meena Boothe is a 48year old male. HPI:     HPI     Diabetic Eye Exam     Diabetes characteristics include Type 2, controlled with diet and taking oral medications. Duration of 21 years. Number of years diabetic 24. Number of years on pills 21.   Nu Valsartan-hydroCHLOROthiazide 160-25 MG Oral Tab Take 1 tablet by mouth daily. 90 tablet 1   • Metoprolol Succinate ER 50 MG Oral Tablet 24 Hr Take 1 tablet (50 mg total) by mouth daily.  90 tablet 1   • atorvastatin 40 MG Oral Tab Take 1 tablet (40 mg tota (History)          PHYSICAL EXAM:     Base Eye Exam     Visual Acuity (Snellen - Linear)       Right Left    Dist sc 20/20 20/20    Near cc 4pt 4pt    Correction:  Glasses   Near is with handheld +2.25           Tonometry (Non-contact air puff, 11:14 AM) control and continue to see their physician as directed. I stressed the importance of yearly diabetic eye exams.  Patient has been advised to call immediately if they notice any changes or problems with their vision     Presbyopia  Recommend +1.75 or +2.00

## 2020-06-23 NOTE — PATIENT INSTRUCTIONS
Pseudophakia of both eyes  Capsule is clear. No treatment is required. Will continue to observe.     Controlled type 2 diabetes mellitus without complication, without long-term current use of insulin ([address]  I advised patient that there is no background diabe

## 2020-06-27 ENCOUNTER — TELEPHONE (OUTPATIENT)
Dept: NEPHROLOGY | Facility: CLINIC | Age: 55
End: 2020-06-27

## 2020-06-27 DIAGNOSIS — R80.9 PROTEINURIA, UNSPECIFIED TYPE: Primary | ICD-10-CM

## 2020-06-27 NOTE — TELEPHONE ENCOUNTER
Labs in. He was also supposed to have done the kidney ultrasound. Please have him do and then see me for follow-up. Reorder as the order has .

## 2020-06-29 NOTE — TELEPHONE ENCOUNTER
US ordered as previously written. Patient contacted (Name and  of pt verified). All results and recommendations reviewed. Patient verbalizes understanding, denies further questions and agrees with plan of care.  Provided phone number for central sche

## 2020-07-14 ENCOUNTER — HOSPITAL ENCOUNTER (OUTPATIENT)
Dept: ULTRASOUND IMAGING | Age: 55
Discharge: HOME | End: 2020-07-14
Attending: INTERNAL MEDICINE
Payer: MEDICAID

## 2020-07-14 DIAGNOSIS — R80.9 PROTEINURIA, UNSPECIFIED TYPE: ICD-10-CM

## 2020-07-14 PROCEDURE — 76770 US EXAM ABDO BACK WALL COMP: CPT | Performed by: INTERNAL MEDICINE

## 2020-07-16 ENCOUNTER — TELEPHONE (OUTPATIENT)
Dept: NEPHROLOGY | Facility: CLINIC | Age: 55
End: 2020-07-16

## 2020-08-07 ENCOUNTER — OFFICE VISIT (OUTPATIENT)
Dept: NEPHROLOGY | Facility: CLINIC | Age: 55
End: 2020-08-07
Payer: MEDICAID

## 2020-08-07 VITALS
WEIGHT: 204.38 LBS | TEMPERATURE: 97 F | BODY MASS INDEX: 30.98 KG/M2 | SYSTOLIC BLOOD PRESSURE: 143 MMHG | HEART RATE: 80 BPM | HEIGHT: 68 IN | DIASTOLIC BLOOD PRESSURE: 92 MMHG

## 2020-08-07 DIAGNOSIS — I10 ESSENTIAL HYPERTENSION: ICD-10-CM

## 2020-08-07 DIAGNOSIS — R80.9 PROTEINURIA, UNSPECIFIED TYPE: ICD-10-CM

## 2020-08-07 DIAGNOSIS — N18.30 CKD (CHRONIC KIDNEY DISEASE) STAGE 3, GFR 30-59 ML/MIN (HCC): Primary | ICD-10-CM

## 2020-08-07 PROCEDURE — 3008F BODY MASS INDEX DOCD: CPT | Performed by: INTERNAL MEDICINE

## 2020-08-07 PROCEDURE — 3077F SYST BP >= 140 MM HG: CPT | Performed by: INTERNAL MEDICINE

## 2020-08-07 PROCEDURE — 3080F DIAST BP >= 90 MM HG: CPT | Performed by: INTERNAL MEDICINE

## 2020-08-07 PROCEDURE — 99215 OFFICE O/P EST HI 40 MIN: CPT | Performed by: INTERNAL MEDICINE

## 2020-08-08 NOTE — PATIENT INSTRUCTIONS
Please check your blood pressures daily and call me in 1 week. Repeat your kidney blood test in 3 months. Orders are in the computer. Continue to work on getting your blood sugars under better control.

## 2020-08-08 NOTE — PROGRESS NOTES
08/08/20        Patient: Nando Saucedo   YOB: 1965   Date of Visit: 8/7/2020       Dear  Dr. Michael Crenshaw MD,      Thank you for referring Nando Saucedo to my practice. Please find my assessment and plan below.       As you know he is a 54-ye pressure and blood sugar control in order to help prevent progressive renal insufficiency and and the need for dialytic therapy. He was advised to check his blood pressure daily and call me in a week. We will adjust blood pressure medications as needed.

## 2020-09-22 RX ORDER — AMLODIPINE BESYLATE 5 MG/1
TABLET ORAL
Qty: 90 TABLET | Refills: 0 | Status: SHIPPED | OUTPATIENT
Start: 2020-09-22 | End: 2020-10-16

## 2020-10-16 ENCOUNTER — OFFICE VISIT (OUTPATIENT)
Dept: INTERNAL MEDICINE CLINIC | Facility: CLINIC | Age: 55
End: 2020-10-16
Payer: MEDICAID

## 2020-10-16 ENCOUNTER — LAB ENCOUNTER (OUTPATIENT)
Dept: LAB | Age: 55
End: 2020-10-16
Attending: INTERNAL MEDICINE
Payer: MEDICAID

## 2020-10-16 VITALS
BODY MASS INDEX: 31.07 KG/M2 | HEART RATE: 82 BPM | SYSTOLIC BLOOD PRESSURE: 161 MMHG | TEMPERATURE: 97 F | WEIGHT: 205 LBS | HEIGHT: 68 IN | DIASTOLIC BLOOD PRESSURE: 105 MMHG | RESPIRATION RATE: 12 BRPM

## 2020-10-16 DIAGNOSIS — Z12.5 PROSTATE CANCER SCREENING: ICD-10-CM

## 2020-10-16 DIAGNOSIS — E11.65 UNCONTROLLED TYPE 2 DIABETES MELLITUS WITH DIABETIC NEPHROPATHY, WITHOUT LONG-TERM CURRENT USE OF INSULIN (HCC): Primary | ICD-10-CM

## 2020-10-16 DIAGNOSIS — E78.00 HYPERCHOLESTEREMIA: ICD-10-CM

## 2020-10-16 DIAGNOSIS — E11.21 UNCONTROLLED TYPE 2 DIABETES MELLITUS WITH DIABETIC NEPHROPATHY, WITHOUT LONG-TERM CURRENT USE OF INSULIN (HCC): Primary | ICD-10-CM

## 2020-10-16 DIAGNOSIS — Z23 IMMUNIZATION DUE: ICD-10-CM

## 2020-10-16 DIAGNOSIS — I10 ESSENTIAL HYPERTENSION: ICD-10-CM

## 2020-10-16 PROCEDURE — 83036 HEMOGLOBIN GLYCOSYLATED A1C: CPT

## 2020-10-16 PROCEDURE — 80053 COMPREHEN METABOLIC PANEL: CPT

## 2020-10-16 PROCEDURE — 3077F SYST BP >= 140 MM HG: CPT | Performed by: INTERNAL MEDICINE

## 2020-10-16 PROCEDURE — 99214 OFFICE O/P EST MOD 30 MIN: CPT | Performed by: INTERNAL MEDICINE

## 2020-10-16 PROCEDURE — 3008F BODY MASS INDEX DOCD: CPT | Performed by: INTERNAL MEDICINE

## 2020-10-16 PROCEDURE — 90471 IMMUNIZATION ADMIN: CPT | Performed by: INTERNAL MEDICINE

## 2020-10-16 PROCEDURE — 90686 IIV4 VACC NO PRSV 0.5 ML IM: CPT | Performed by: INTERNAL MEDICINE

## 2020-10-16 PROCEDURE — 3080F DIAST BP >= 90 MM HG: CPT | Performed by: INTERNAL MEDICINE

## 2020-10-16 PROCEDURE — 80061 LIPID PANEL: CPT

## 2020-10-16 PROCEDURE — 83036 HEMOGLOBIN GLYCOSYLATED A1C: CPT | Performed by: INTERNAL MEDICINE

## 2020-10-16 PROCEDURE — 36416 COLLJ CAPILLARY BLOOD SPEC: CPT | Performed by: INTERNAL MEDICINE

## 2020-10-16 PROCEDURE — 36415 COLL VENOUS BLD VENIPUNCTURE: CPT

## 2020-10-16 RX ORDER — METFORMIN HYDROCHLORIDE 500 MG/1
TABLET, EXTENDED RELEASE ORAL
Qty: 360 TABLET | Refills: 1 | Status: SHIPPED | OUTPATIENT
Start: 2020-10-16 | End: 2021-07-27 | Stop reason: ALTCHOICE

## 2020-10-16 RX ORDER — AMLODIPINE BESYLATE 10 MG/1
10 TABLET ORAL DAILY
Qty: 90 TABLET | Refills: 1 | Status: SHIPPED | OUTPATIENT
Start: 2020-10-16 | End: 2021-07-25

## 2020-10-16 NOTE — PROGRESS NOTES
HPI:    Patient ID: Abebe Zepeda is a 48year old male. Diabetes  He presents for his follow-up diabetic visit. He has type 2 diabetes mellitus. His disease course has been stable. There are no hypoglycemic associated symptoms.  There are no diabetic as hypertension causing med. Review of Systems   Constitutional: Negative. HENT: Negative. Eyes: Negative. Respiratory: Negative. Negative for shortness of breath. Cardiovascular: Negative. Negative for chest pain.    Gastrointestinal: Nega taking: Reported on 10/16/2020 ) 30 tablet 11   • ONETOUCH ULTRASOFT LANCETS Does not apply Misc Check glucose daily (Patient not taking: Reported on 10/16/2020 ) 100 each 1   • Glucose Blood (ONETOUCH ULTRA BLUE) In Vitro Strip Check glucose daily (Trixie respiratory distress. He has no wheezes. He has no rales. Abdominal: Soft. Bowel sounds are normal. He exhibits no distension and no mass. There is no abdominal tenderness. There is no rebound and no guarding. Musculoskeletal: Normal range of motion.

## 2020-10-19 RX ORDER — GLIPIZIDE 5 MG/1
5 TABLET ORAL
Qty: 180 TABLET | Refills: 1 | Status: SHIPPED | OUTPATIENT
Start: 2020-10-19 | End: 2021-07-31

## 2020-12-14 ENCOUNTER — PATIENT MESSAGE (OUTPATIENT)
Dept: SURGERY | Facility: CLINIC | Age: 55
End: 2020-12-14

## 2020-12-15 NOTE — TELEPHONE ENCOUNTER
Please advise    Hi Dr. Christiane Swanson. I am  currently talking Sildenafil Citrate 20 mg . This medications is not working to well for me. I would like to know if the is something else. I heard of a shot that may work better. Can you get back to me on this?  Thank

## 2021-01-20 ENCOUNTER — TELEPHONE (OUTPATIENT)
Dept: SURGERY | Facility: CLINIC | Age: 56
End: 2021-01-20

## 2021-01-20 NOTE — TELEPHONE ENCOUNTER
Pt sent a msg back in Dec via [address] [place] [address] [address] regarding changing medication no response yet asking can it be changed not working for him please advise     Lucho msg:   Sildenafil Citrate 20 mg . This medications is not working to well for me.  I would like to knnano

## 2021-01-20 NOTE — TELEPHONE ENCOUNTER
Fwd to Dr. Murray Chen to advise/recommend on injection or review below    Per last telemedicine visit on 06/2020  Recommended:  – Prescribe sildenafil 20 mg tablets to take 5 tablets in his if not effective he will call and we can try to switch to tadalafil 20

## 2021-01-22 NOTE — TELEPHONE ENCOUNTER
Called patient, no answer  Will try again shortly.    If unable to reach, will send [facility] message via [employer]

## 2021-01-22 NOTE — TELEPHONE ENCOUNTER
Called patient, identified by name and Finas Spatz Dr. Terrall Gey message.  Aware of medicine and need for teaching  Advised medication may need PA and can take a few days for review    Will send Caverject (generic) per INS to pharmacy   With note to pharmac

## 2021-01-22 NOTE — TELEPHONE ENCOUNTER
I believe the patient is referring to intracavernosal injection therapy. This is medicine similar to Viagra or Cialis that would be injected with a small needle directly into the penis.   If he is agreeable, nursing staff may send a prescription for Chuckj

## 2021-01-23 ENCOUNTER — TELEPHONE (OUTPATIENT)
Dept: SURGERY | Facility: CLINIC | Age: 56
End: 2021-01-23

## 2021-01-23 NOTE — TELEPHONE ENCOUNTER
Per pharmacy wanting to clarify what kind of Alprostadil, Vasodilator, 20 MCG Intracavernosal Recon Soln is needed. Stating a few different kind pop up when inputting in system. Advised clinical staff will be back Monday.  Please advise

## 2021-01-26 NOTE — TELEPHONE ENCOUNTER
[facility] [facility] pharmacy contacted. Clarified medication is for Machinarject Kit. Pharmacist verbalized understanding. All questions answered. See 1/20/21 Te; Medication Question for MD's order.

## 2021-02-09 RX ORDER — METOPROLOL SUCCINATE 50 MG/1
TABLET, EXTENDED RELEASE ORAL
Qty: 90 TABLET | Refills: 0 | Status: SHIPPED | OUTPATIENT
Start: 2021-02-09 | End: 2021-06-11

## 2021-04-05 RX ORDER — ATORVASTATIN CALCIUM 40 MG/1
40 TABLET, FILM COATED ORAL NIGHTLY
Qty: 90 TABLET | Refills: 0 | Status: SHIPPED | OUTPATIENT
Start: 2021-04-05 | End: 2021-06-11

## 2021-04-06 NOTE — TELEPHONE ENCOUNTER
It does not look like he ever received any teaching when he was prescribed this in January. Please clarify this. If he has not received teaching he needs an appointment for IC injection teaching.      Thank you,   Sergio MACIAS   [facility]-Urology

## 2021-05-07 ENCOUNTER — OFFICE VISIT (OUTPATIENT)
Dept: INTERNAL MEDICINE CLINIC | Facility: CLINIC | Age: 56
End: 2021-05-07
Payer: MEDICAID

## 2021-05-07 VITALS
SYSTOLIC BLOOD PRESSURE: 136 MMHG | RESPIRATION RATE: 12 BRPM | TEMPERATURE: 97 F | HEIGHT: 68 IN | DIASTOLIC BLOOD PRESSURE: 80 MMHG | BODY MASS INDEX: 31.37 KG/M2 | HEART RATE: 78 BPM | WEIGHT: 207 LBS

## 2021-05-07 DIAGNOSIS — E78.00 HYPERCHOLESTEREMIA: ICD-10-CM

## 2021-05-07 DIAGNOSIS — Z12.11 COLON CANCER SCREENING: ICD-10-CM

## 2021-05-07 DIAGNOSIS — I10 ESSENTIAL HYPERTENSION: ICD-10-CM

## 2021-05-07 DIAGNOSIS — E11.21 CONTROLLED TYPE 2 DIABETES MELLITUS WITH DIABETIC NEPHROPATHY, WITHOUT LONG-TERM CURRENT USE OF INSULIN (HCC): Primary | ICD-10-CM

## 2021-05-07 PROCEDURE — 3075F SYST BP GE 130 - 139MM HG: CPT | Performed by: INTERNAL MEDICINE

## 2021-05-07 PROCEDURE — 99214 OFFICE O/P EST MOD 30 MIN: CPT | Performed by: INTERNAL MEDICINE

## 2021-05-07 PROCEDURE — 3079F DIAST BP 80-89 MM HG: CPT | Performed by: INTERNAL MEDICINE

## 2021-05-07 PROCEDURE — 3008F BODY MASS INDEX DOCD: CPT | Performed by: INTERNAL MEDICINE

## 2021-05-07 NOTE — PROGRESS NOTES
HPI/Subjective:     Patient ID: Manny Galvan is a 50year old male. Diabetes  He presents for his follow-up diabetic visit. He has type 2 diabetes mellitus. His disease course has been fluctuating. There are no hypoglycemic associated symptoms.  There a controlled. Pertinent negatives include no chest pain, peripheral edema or shortness of breath. There are no associated agents to hypertension. Risk factors for coronary artery disease include dyslipidemia, diabetes mellitus and male gender.  Past treatment Kit Check glucose daily 1 kit 0   • ONETOUCH ULTRASOFT LANCETS Does not apply Misc Check glucose daily 100 each 1   • Sildenafil Citrate 20 MG Oral Tab Take 1 tablet (20 mg total) by mouth as needed.  Take up to 5 tablets together 1-2 hours prior to sexual Alcohol use: Yes      Comment: occasional    Drug use: No       Objective:   Physical Exam  Constitutional:       General: He is not in acute distress. Appearance: He is not ill-appearing, toxic-appearing or diaphoretic.    HENT:      Head: Normocephal Check lipid panel; continue with low chol diet and chol med.      (I10) Essential hypertension  Plan: MICROALB/CREAT RATIO, RANDOM URINE        bp controlled though told to get sbp<130; he will monitor bp at home and call if remains above goal. Continue

## 2021-05-15 ENCOUNTER — LAB ENCOUNTER (OUTPATIENT)
Dept: LAB | Age: 56
End: 2021-05-15
Attending: INTERNAL MEDICINE
Payer: MEDICAID

## 2021-05-15 DIAGNOSIS — E78.00 HYPERCHOLESTEREMIA: ICD-10-CM

## 2021-05-15 DIAGNOSIS — I10 ESSENTIAL HYPERTENSION: ICD-10-CM

## 2021-05-15 PROCEDURE — 80061 LIPID PANEL: CPT

## 2021-05-15 PROCEDURE — 82570 ASSAY OF URINE CREATININE: CPT

## 2021-05-15 PROCEDURE — 82043 UR ALBUMIN QUANTITATIVE: CPT

## 2021-05-15 PROCEDURE — 36415 COLL VENOUS BLD VENIPUNCTURE: CPT

## 2021-05-15 PROCEDURE — 3060F POS MICROALBUMINURIA REV: CPT | Performed by: INTERNAL MEDICINE

## 2021-05-15 PROCEDURE — 80053 COMPREHEN METABOLIC PANEL: CPT

## 2021-06-08 RX ORDER — VALSARTAN AND HYDROCHLOROTHIAZIDE 160; 25 MG/1; MG/1
TABLET ORAL
Qty: 30 TABLET | Refills: 0 | Status: SHIPPED | OUTPATIENT
Start: 2021-06-08 | End: 2021-11-17

## 2021-06-08 NOTE — TELEPHONE ENCOUNTER
pls call pt; he was told to ffup with nephro Dr Aubrie Lane last month due to his worsening creatinine which was up to 2.6 on labs done last month.  I dont see any office ov with dr Aubrie Lane and pt needs to do this to evaluate why sudden worsening of his kidney

## 2021-06-08 NOTE — TELEPHONE ENCOUNTER
The patient was informed with understanding. He stated was out of town and is back now. The call was [facility] to schedule the appointment.

## 2021-06-11 RX ORDER — ALPROSTADIL 20 UG/.5ML
INJECTION, POWDER, LYOPHILIZED, FOR SOLUTION INTRACAVERNOUS
Qty: 2 EACH | Refills: 0 | OUTPATIENT
Start: 2021-06-11

## 2021-06-11 RX ORDER — ATORVASTATIN CALCIUM 40 MG/1
40 TABLET, FILM COATED ORAL NIGHTLY
Qty: 90 TABLET | Refills: 0 | Status: SHIPPED | OUTPATIENT
Start: 2021-06-11 | End: 2021-09-02

## 2021-06-11 RX ORDER — AMLODIPINE BESYLATE 5 MG/1
TABLET ORAL
Qty: 90 TABLET | Refills: 0 | Status: SHIPPED | OUTPATIENT
Start: 2021-06-11 | End: 2021-08-27 | Stop reason: DRUGHIGH

## 2021-06-11 RX ORDER — METOPROLOL SUCCINATE 50 MG/1
TABLET, EXTENDED RELEASE ORAL
Qty: 90 TABLET | Refills: 0 | Status: SHIPPED | OUTPATIENT
Start: 2021-06-11 | End: 2021-07-27

## 2021-06-11 NOTE — TELEPHONE ENCOUNTER
Received refill request for caverject inj  Patient's LOV greater than 12 months   Medication does not meet refill criteria protocol  Medication denied.  Patient needs to schedule an appointment  [employer] message sent to patient

## 2021-07-20 ENCOUNTER — TELEPHONE (OUTPATIENT)
Dept: NEPHROLOGY | Facility: CLINIC | Age: 56
End: 2021-07-20

## 2021-07-20 NOTE — TELEPHONE ENCOUNTER
Patient was a no-show. Advise him that his kidney function is deteriorating. Needs to be seen. Have him do the standing order and then see me for follow-up.

## 2021-07-23 NOTE — TELEPHONE ENCOUNTER
Patient contacted. Relayed Dr. Franklin Dinh message. Patient will do lab work on Monday.  Appointment booked for Tuesday 7/27/21 at 4:00 pm.

## 2021-07-25 RX ORDER — AMLODIPINE BESYLATE 10 MG/1
TABLET ORAL
Qty: 90 TABLET | Refills: 0 | Status: SHIPPED | OUTPATIENT
Start: 2021-07-25 | End: 2021-07-31

## 2021-07-26 ENCOUNTER — LAB ENCOUNTER (OUTPATIENT)
Dept: LAB | Age: 56
End: 2021-07-26
Attending: INTERNAL MEDICINE
Payer: MEDICAID

## 2021-07-26 DIAGNOSIS — N18.30 CKD (CHRONIC KIDNEY DISEASE) STAGE 3, GFR 30-59 ML/MIN (HCC): ICD-10-CM

## 2021-07-26 LAB
ALBUMIN SERPL-MCNC: 3 G/DL (ref 3.4–5)
ANION GAP SERPL CALC-SCNC: 6 MMOL/L (ref 0–18)
BASOPHILS # BLD AUTO: 0.04 X10(3) UL (ref 0–0.2)
BASOPHILS NFR BLD AUTO: 0.6 %
BUN BLD-MCNC: 51 MG/DL (ref 7–18)
BUN/CREAT SERPL: 18 (ref 10–20)
CALCIUM BLD-MCNC: 8.3 MG/DL (ref 8.5–10.1)
CHLORIDE SERPL-SCNC: 106 MMOL/L (ref 98–112)
CO2 SERPL-SCNC: 25 MMOL/L (ref 21–32)
CREAT BLD-MCNC: 2.84 MG/DL
DEPRECATED RDW RBC AUTO: 40 FL (ref 35.1–46.3)
EOSINOPHIL # BLD AUTO: 0.31 X10(3) UL (ref 0–0.7)
EOSINOPHIL NFR BLD AUTO: 4.7 %
ERYTHROCYTE [DISTWIDTH] IN BLOOD BY AUTOMATED COUNT: 13.1 % (ref 11–15)
GLUCOSE BLD-MCNC: 110 MG/DL (ref 70–99)
HCT VFR BLD AUTO: 30.6 %
HGB BLD-MCNC: 10.4 G/DL
IMM GRANULOCYTES # BLD AUTO: 0.02 X10(3) UL (ref 0–1)
IMM GRANULOCYTES NFR BLD: 0.3 %
LYMPHOCYTES # BLD AUTO: 1.77 X10(3) UL (ref 1–4)
LYMPHOCYTES NFR BLD AUTO: 26.9 %
MCH RBC QN AUTO: 28.8 PG (ref 26–34)
MCHC RBC AUTO-ENTMCNC: 34 G/DL (ref 31–37)
MCV RBC AUTO: 84.8 FL
MONOCYTES # BLD AUTO: 0.61 X10(3) UL (ref 0.1–1)
MONOCYTES NFR BLD AUTO: 9.3 %
NEUTROPHILS # BLD AUTO: 3.82 X10 (3) UL (ref 1.5–7.7)
NEUTROPHILS # BLD AUTO: 3.82 X10(3) UL (ref 1.5–7.7)
NEUTROPHILS NFR BLD AUTO: 58.2 %
OSMOLALITY SERPL CALC.SUM OF ELEC: 298 MOSM/KG (ref 275–295)
PHOSPHATE SERPL-MCNC: 3.2 MG/DL (ref 2.5–4.9)
PLATELET # BLD AUTO: 323 10(3)UL (ref 150–450)
POTASSIUM SERPL-SCNC: 3.8 MMOL/L (ref 3.5–5.1)
RBC # BLD AUTO: 3.61 X10(6)UL
SODIUM SERPL-SCNC: 137 MMOL/L (ref 136–145)
WBC # BLD AUTO: 6.6 X10(3) UL (ref 4–11)

## 2021-07-26 PROCEDURE — 80069 RENAL FUNCTION PANEL: CPT

## 2021-07-26 PROCEDURE — 85025 COMPLETE CBC W/AUTO DIFF WBC: CPT

## 2021-07-26 PROCEDURE — 36415 COLL VENOUS BLD VENIPUNCTURE: CPT

## 2021-07-27 ENCOUNTER — OFFICE VISIT (OUTPATIENT)
Dept: NEPHROLOGY | Facility: CLINIC | Age: 56
End: 2021-07-27
Payer: MEDICAID

## 2021-07-27 VITALS
WEIGHT: 208.63 LBS | HEART RATE: 81 BPM | DIASTOLIC BLOOD PRESSURE: 91 MMHG | BODY MASS INDEX: 31.62 KG/M2 | HEIGHT: 68 IN | SYSTOLIC BLOOD PRESSURE: 151 MMHG

## 2021-07-27 DIAGNOSIS — N18.4 CKD (CHRONIC KIDNEY DISEASE) STAGE 4, GFR 15-29 ML/MIN (HCC): Primary | ICD-10-CM

## 2021-07-27 DIAGNOSIS — I10 ESSENTIAL HYPERTENSION: ICD-10-CM

## 2021-07-27 PROCEDURE — 3080F DIAST BP >= 90 MM HG: CPT | Performed by: INTERNAL MEDICINE

## 2021-07-27 PROCEDURE — 3008F BODY MASS INDEX DOCD: CPT | Performed by: INTERNAL MEDICINE

## 2021-07-27 PROCEDURE — 3077F SYST BP >= 140 MM HG: CPT | Performed by: INTERNAL MEDICINE

## 2021-07-27 PROCEDURE — 99214 OFFICE O/P EST MOD 30 MIN: CPT | Performed by: INTERNAL MEDICINE

## 2021-07-27 RX ORDER — METOPROLOL SUCCINATE 100 MG/1
100 TABLET, EXTENDED RELEASE ORAL DAILY
Qty: 90 TABLET | Refills: 1 | Status: SHIPPED | OUTPATIENT
Start: 2021-07-27

## 2021-07-27 NOTE — PROGRESS NOTES
07/27/21        Patient: Aditya Khan   YOB: 1965   Date of Visit: 7/27/2021       Dear  Dr. Brenden Ayon MD,      Thank you for referring Aditya Khan to my practice. Please find my assessment and plan below.       As you know he is a 55-y blood sugar control. Low-salt diabetic diet. He states he can do a better job of drinking fluids. Avoid nonsteroidals. We will increase his Toprol-XL to 100 mg daily. Check blood pressures and heart rates daily and call me in a week.   Repeat CBC and r

## 2021-07-27 NOTE — PATIENT INSTRUCTIONS
Increase your metoprolol to 100 mg daily. Check your blood pressures and heart rates daily and call me in a week. Repeat your kidney blood test in 1 to 2 months.

## 2021-07-30 ENCOUNTER — OFFICE VISIT (OUTPATIENT)
Dept: SURGERY | Facility: CLINIC | Age: 56
End: 2021-07-30
Payer: MEDICAID

## 2021-07-30 ENCOUNTER — LAB ENCOUNTER (OUTPATIENT)
Dept: LAB | Facility: HOSPITAL | Age: 56
End: 2021-07-30
Attending: UROLOGY
Payer: MEDICAID

## 2021-07-30 ENCOUNTER — TELEPHONE (OUTPATIENT)
Dept: SURGERY | Facility: CLINIC | Age: 56
End: 2021-07-30

## 2021-07-30 ENCOUNTER — NURSE ONLY (OUTPATIENT)
Dept: GASTROENTEROLOGY | Facility: CLINIC | Age: 56
End: 2021-07-30

## 2021-07-30 VITALS
HEART RATE: 79 BPM | BODY MASS INDEX: 31.52 KG/M2 | SYSTOLIC BLOOD PRESSURE: 187 MMHG | HEIGHT: 68 IN | DIASTOLIC BLOOD PRESSURE: 119 MMHG | WEIGHT: 208 LBS

## 2021-07-30 DIAGNOSIS — R31.29 MICROHEMATURIA: ICD-10-CM

## 2021-07-30 DIAGNOSIS — N52.9 ERECTILE DYSFUNCTION, UNSPECIFIED ERECTILE DYSFUNCTION TYPE: Primary | ICD-10-CM

## 2021-07-30 LAB
BILIRUB UR QL: NEGATIVE
CLARITY UR: CLEAR
COLOR UR: YELLOW
GLUCOSE UR-MCNC: NEGATIVE MG/DL
HYALINE CASTS #/AREA URNS AUTO: PRESENT /LPF
KETONES UR-MCNC: NEGATIVE MG/DL
LEUKOCYTE ESTERASE UR QL STRIP.AUTO: NEGATIVE
NITRITE UR QL STRIP.AUTO: NEGATIVE
PH UR: 6 [PH] (ref 5–8)
PROT UR-MCNC: >=500 MG/DL
SP GR UR STRIP: 1.01 (ref 1–1.03)
UROBILINOGEN UR STRIP-ACNC: <2

## 2021-07-30 PROCEDURE — 81001 URINALYSIS AUTO W/SCOPE: CPT

## 2021-07-30 PROCEDURE — 3077F SYST BP >= 140 MM HG: CPT | Performed by: UROLOGY

## 2021-07-30 PROCEDURE — 99214 OFFICE O/P EST MOD 30 MIN: CPT | Performed by: UROLOGY

## 2021-07-30 PROCEDURE — 3080F DIAST BP >= 90 MM HG: CPT | Performed by: UROLOGY

## 2021-07-30 PROCEDURE — 3008F BODY MASS INDEX DOCD: CPT | Performed by: UROLOGY

## 2021-07-30 NOTE — PROGRESS NOTES
Betsy Crigler is a 50year old male. HPI:   Patient presents with:  Erectile Dysfuntion: Pt states here to renew script. Pt denies any changes. 27-year-old -American male presents in follow-up to previous phone visit June 1, 2020.   He had metoprolol succinate 100 MG Oral Tablet 24 Hr Take 1 tablet (100 mg total) by mouth daily.  90 tablet 1   • AMLODIPINE BESYLATE 10 MG Oral Tab Take 1 tablet by mouth once daily 90 tablet 0   • ATORVASTATIN 40 MG Oral Tab TAKE 1 TABLET (40 MG TOTAL) BY MOUTH phallus and testicle exams unremarkable. Digital prostate exam shows a 55 g prostate symmetric without masses or nodules.      ASSESSMENT/PLAN:   Assessment   Microhematuria  Erectile dysfunction, unspecified erectile dysfunction type  (primary encounter d

## 2021-07-30 NOTE — PROGRESS NOTES
Per Epic patient has uncontrolled high blood pressure & abnormal labs from 7/26/2021- scheduled for consult. Date, time & location given to patient.      Future Appointments   Date Time Provider Mihaela Caban   8/17/2021  4:30 PM StuCarolin A

## 2021-07-30 NOTE — TELEPHONE ENCOUNTER
JESSICAB prescribed T8 test dose of Trimix today at the o/v and pt was told her would be contacted for payment and the med would be shipped to our office to be used at his next appt.  I faxed the script and today o/v notes to Emi Werner and received a f

## 2021-08-01 RX ORDER — AMLODIPINE BESYLATE 10 MG/1
10 TABLET ORAL DAILY
Qty: 90 TABLET | Refills: 0 | Status: SHIPPED | OUTPATIENT
Start: 2021-08-01 | End: 2021-09-02

## 2021-08-01 RX ORDER — LANCETS
EACH MISCELLANEOUS
Qty: 100 EACH | Refills: 1 | Status: SHIPPED | OUTPATIENT
Start: 2021-08-01 | End: 2021-08-04 | Stop reason: CLARIF

## 2021-08-01 RX ORDER — GLIPIZIDE 5 MG/1
5 TABLET ORAL
Qty: 180 TABLET | Refills: 1 | Status: SHIPPED | OUTPATIENT
Start: 2021-08-01 | End: 2021-09-02

## 2021-08-03 ENCOUNTER — TELEPHONE (OUTPATIENT)
Dept: SURGERY | Facility: CLINIC | Age: 56
End: 2021-08-03

## 2021-08-03 NOTE — TELEPHONE ENCOUNTER
----- Message from Navdeep Casas MD sent at 8/1/2021 11:07 AM CDT -----  Urology staff,Please call this patient. Let him know that his urine analysis performed at his last visit last week continues to demonstrate microscopic levels of blood in the urine. This is an abnormal finding. I would recommend performing an outpatient procedure in the form of cystoscopy and bilateral retrograde pyelograms. This would help rule out any abnormalities within his bladder. An x-ray of the collecting systems of both kidneys to be done at the same time as he cannot receive intravenous contrast because of his kidney function. If the patient is agreeable, let me know and I will send a message to GUERRERO MORAN to schedule him accordingly.

## 2021-08-03 NOTE — TELEPHONE ENCOUNTER
Called patient, no answer     Left message per BRIANNA  Left voice message with [facility] recommendation, instructed to call office back to let us know how he wishes to proceed.

## 2021-08-04 ENCOUNTER — TELEPHONE (OUTPATIENT)
Dept: SURGERY | Facility: CLINIC | Age: 56
End: 2021-08-04

## 2021-08-04 ENCOUNTER — TELEPHONE (OUTPATIENT)
Dept: INTERNAL MEDICINE CLINIC | Facility: CLINIC | Age: 56
End: 2021-08-04

## 2021-08-04 RX ORDER — LANCETS 33 GAUGE
1 EACH MISCELLANEOUS DAILY
Qty: 100 EACH | Refills: 3 | Status: SHIPPED | OUTPATIENT
Start: 2021-08-04 | End: 2021-08-15

## 2021-08-04 NOTE — TELEPHONE ENCOUNTER
Patient scheduled today with [facility] for testosterone teaching injection   [facility] since we have not received test dose.  Spoke with pharmacist, states they received script with appt date for 8/17 so medicine has been approved but not shipped

## 2021-08-04 NOTE — TELEPHONE ENCOUNTER
Called patient, identified by name and   Relayed message in regards to results from [place].  Patient verbalizes and understands  States he will be out of town and would like to proceed with procedure when he returns after     Will forward to [place]

## 2021-08-10 ENCOUNTER — OFFICE VISIT (OUTPATIENT)
Dept: SURGERY | Facility: CLINIC | Age: 56
End: 2021-08-10
Payer: MEDICAID

## 2021-08-10 VITALS
HEIGHT: 68 IN | BODY MASS INDEX: 31.37 KG/M2 | WEIGHT: 207 LBS | SYSTOLIC BLOOD PRESSURE: 165 MMHG | DIASTOLIC BLOOD PRESSURE: 108 MMHG | HEART RATE: 76 BPM

## 2021-08-10 DIAGNOSIS — N52.9 ERECTILE DYSFUNCTION, UNSPECIFIED ERECTILE DYSFUNCTION TYPE: Primary | ICD-10-CM

## 2021-08-10 PROCEDURE — 3008F BODY MASS INDEX DOCD: CPT | Performed by: NURSE PRACTITIONER

## 2021-08-10 PROCEDURE — 54235 NJX CORPORA CAVERNOSA RX AGT: CPT | Performed by: NURSE PRACTITIONER

## 2021-08-10 PROCEDURE — 3080F DIAST BP >= 90 MM HG: CPT | Performed by: NURSE PRACTITIONER

## 2021-08-10 PROCEDURE — 3077F SYST BP >= 140 MM HG: CPT | Performed by: NURSE PRACTITIONER

## 2021-08-10 NOTE — PROGRESS NOTES
HPI/Subjective:     Patient ID: Cinthya Morris is a 50year old male. HPI     Patient is a 50year old male who presents to the clinic for IC injection teaching. History of ED. He has failed treatment with oral PDE-5 inhibitors.   He presents today Oral Tab Take 1 tablet by mouth once daily 90 tablet 0     Allergies:  Losartan                Coughing    Past Medical History:   Diagnosis Date   • Diabetes ([facility]    • Diabetes mellitus ([facility]    • Essential hypertension    • Hyperlipidemia    • Strabismu year old male who presents to the clinic for intracavernosal injection teaching. Potential risks of injection including priapism, injection site pain, and fibrosis were discussed.   Discussed seeking medication attention for an erection lasting longer than

## 2021-08-15 ENCOUNTER — PATIENT MESSAGE (OUTPATIENT)
Dept: NEPHROLOGY | Facility: CLINIC | Age: 56
End: 2021-08-15

## 2021-08-15 RX ORDER — LANCETS 33 GAUGE
EACH MISCELLANEOUS
Qty: 100 EACH | Refills: 0 | Status: SHIPPED | OUTPATIENT
Start: 2021-08-15

## 2021-08-16 NOTE — TELEPHONE ENCOUNTER
Blood pressure is too high. If only on amlodipine 5 mg daily increase to 10 mg daily if no side effects. Update med list.  Check blood pressures and heart rates daily and call in 1 week.

## 2021-08-16 NOTE — TELEPHONE ENCOUNTER
From: Juan Jose Mcguire  To:  Tanner Kapadia MD  Sent: 8/15/2021 11:51 AM CDT  Subject: Other    Blood Pressure Result  7/28/21- 154/101 74  7/28/21- 157/96 80  7/29/21- 185/119 85  7/30/21- 156/104 75  8/2/21- 186/120 72  8/3/21- 194/118 73  8/4/21- 175/106 7

## 2021-08-27 ENCOUNTER — TELEPHONE (OUTPATIENT)
Dept: INTERNAL MEDICINE CLINIC | Facility: CLINIC | Age: 56
End: 2021-08-27

## 2021-09-03 RX ORDER — AMLODIPINE BESYLATE 10 MG/1
10 TABLET ORAL DAILY
Qty: 90 TABLET | Refills: 0 | Status: SHIPPED | OUTPATIENT
Start: 2021-09-03 | End: 2021-12-17

## 2021-09-03 RX ORDER — ATORVASTATIN CALCIUM 40 MG/1
40 TABLET, FILM COATED ORAL NIGHTLY
Qty: 90 TABLET | Refills: 0 | Status: SHIPPED | OUTPATIENT
Start: 2021-09-03 | End: 2021-11-12

## 2021-09-03 RX ORDER — GLIPIZIDE 5 MG/1
5 TABLET ORAL
Qty: 180 TABLET | Refills: 1 | Status: SHIPPED | OUTPATIENT
Start: 2021-09-03

## 2021-10-15 ENCOUNTER — OFFICE VISIT (OUTPATIENT)
Dept: GASTROENTEROLOGY | Facility: CLINIC | Age: 56
End: 2021-10-15
Payer: MEDICAID

## 2021-10-15 VITALS
HEIGHT: 68 IN | DIASTOLIC BLOOD PRESSURE: 100 MMHG | BODY MASS INDEX: 32.43 KG/M2 | SYSTOLIC BLOOD PRESSURE: 160 MMHG | HEART RATE: 74 BPM | WEIGHT: 214 LBS

## 2021-10-15 DIAGNOSIS — I10 ESSENTIAL HYPERTENSION: ICD-10-CM

## 2021-10-15 DIAGNOSIS — E11.69 TYPE 2 DIABETES MELLITUS WITH OTHER SPECIFIED COMPLICATION, WITHOUT LONG-TERM CURRENT USE OF INSULIN (HCC): ICD-10-CM

## 2021-10-15 DIAGNOSIS — I10 PRIMARY HYPERTENSION: Primary | ICD-10-CM

## 2021-10-15 DIAGNOSIS — D64.9 ANEMIA, UNSPECIFIED TYPE: ICD-10-CM

## 2021-10-15 PROCEDURE — 3008F BODY MASS INDEX DOCD: CPT | Performed by: INTERNAL MEDICINE

## 2021-10-15 PROCEDURE — 3080F DIAST BP >= 90 MM HG: CPT | Performed by: INTERNAL MEDICINE

## 2021-10-15 PROCEDURE — 99243 OFF/OP CNSLTJ NEW/EST LOW 30: CPT | Performed by: INTERNAL MEDICINE

## 2021-10-15 PROCEDURE — 3077F SYST BP >= 140 MM HG: CPT | Performed by: INTERNAL MEDICINE

## 2021-10-15 NOTE — PATIENT INSTRUCTIONS
GO FOR STRESS TEST FIRST BEFORE WE SCHEDULE BELOW        1. Schedule colonoscopy/EGD with [facility] [Diagnosis: screening, anemia]    2.  bowel prep from pharmacy (single dose Golytely)    3. Medication adjustment:       A.  Day BEFORE colonoscopy: HOLD gl

## 2021-10-15 NOTE — H&P
[address] [facility] [address] Gastroenterology                                                                                                               Reason for consult: H Vaping Use: Never used    Alcohol use: Yes      Comment: social use only.     Drug use: No       Medications (Active prior to today's visit):  Current Outpatient Medications   Medication Sig Dispense Refill   • atorvastatin 40 MG Oral Tab Take 1 tablet (40 dysuria or gross hematuria  INTEGUMENT/BREAST:  SKIN:  negative for jaundice   ALLERGIC/IMMUNOLOGIC:  negative for hay fever  ENDOCRINE:  negative for cold intolerance and heat intolerance  MUSCULOSKELETAL:  negative for joint effusion/severe erythema  BEH carefully    5. AVOID seeds, nuts, popcorn, raw fruits and vegetables (cooked is okay) for 2-3 days before procedure    6. You will need to go for COVID testing 72 hours before procedure.  The testing team will call you a few days before your procedure to navid

## 2021-10-25 ENCOUNTER — TELEPHONE (OUTPATIENT)
Dept: NEPHROLOGY | Facility: CLINIC | Age: 56
End: 2021-10-25

## 2021-10-25 NOTE — TELEPHONE ENCOUNTER
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 Patient requested it be faxed to [employer] [facility]. Fax number on last page of form. Placed in RN in-box.

## 2021-10-25 NOTE — TELEPHONE ENCOUNTER
I need to know how his blood pressure readings are doing and he needs to do follow-up labs as ordered. Anesthesia Type: 0.1% lidocaine with 1:1,000,000 epinephrine (tumescent anesthesia)

## 2021-10-27 ENCOUNTER — LAB ENCOUNTER (OUTPATIENT)
Dept: LAB | Age: 56
End: 2021-10-27
Attending: INTERNAL MEDICINE
Payer: MEDICAID

## 2021-10-27 DIAGNOSIS — N18.4 CKD (CHRONIC KIDNEY DISEASE) STAGE 4, GFR 15-29 ML/MIN (HCC): ICD-10-CM

## 2021-10-27 PROCEDURE — 80069 RENAL FUNCTION PANEL: CPT

## 2021-10-27 PROCEDURE — 85025 COMPLETE CBC W/AUTO DIFF WBC: CPT

## 2021-10-27 PROCEDURE — 36415 COLL VENOUS BLD VENIPUNCTURE: CPT

## 2021-10-31 ENCOUNTER — TELEPHONE (OUTPATIENT)
Dept: NEPHROLOGY | Facility: CLINIC | Age: 56
End: 2021-10-31

## 2021-10-31 NOTE — TELEPHONE ENCOUNTER
Kidney functions slightly worse. Does he have any more blood pressure readings? The readings he just called in are running a little high. Increase Toprol-XL to 150 mg daily. Call check blood pressures and heart rates daily and call in 1 week.

## 2021-11-01 NOTE — TELEPHONE ENCOUNTER
Spoke with patient regarding message below. Patient verbalized understanding and provided the following BP readings. Patient states he will take the new dose of medication as well as call next week with new readings.        10/28 141/97 86  10/29 128/92 83

## 2021-11-01 NOTE — TELEPHONE ENCOUNTER
Spoke with patient and let him know that he needs to make sure he calls us next week with BP readings and then we will let him know about the return to work letter. Patient verbalized understanding.

## 2021-11-02 ENCOUNTER — LAB ENCOUNTER (OUTPATIENT)
Dept: LAB | Facility: HOSPITAL | Age: 56
End: 2021-11-02
Attending: INTERNAL MEDICINE
Payer: MEDICAID

## 2021-11-02 ENCOUNTER — OFFICE VISIT (OUTPATIENT)
Dept: INTERNAL MEDICINE CLINIC | Facility: CLINIC | Age: 56
End: 2021-11-02
Payer: MEDICAID

## 2021-11-02 VITALS
SYSTOLIC BLOOD PRESSURE: 126 MMHG | WEIGHT: 208.19 LBS | TEMPERATURE: 98 F | DIASTOLIC BLOOD PRESSURE: 76 MMHG | BODY MASS INDEX: 31.55 KG/M2 | HEART RATE: 71 BPM | OXYGEN SATURATION: 99 % | HEIGHT: 68 IN

## 2021-11-02 DIAGNOSIS — E11.65 UNCONTROLLED TYPE 2 DIABETES MELLITUS WITH HYPERGLYCEMIA (HCC): ICD-10-CM

## 2021-11-02 DIAGNOSIS — Z00.00 ANNUAL PHYSICAL EXAM: Primary | ICD-10-CM

## 2021-11-02 DIAGNOSIS — E78.00 HYPERCHOLESTEREMIA: ICD-10-CM

## 2021-11-02 DIAGNOSIS — I10 PRIMARY HYPERTENSION: ICD-10-CM

## 2021-11-02 DIAGNOSIS — N18.4 CKD STAGE 4 DUE TO TYPE 2 DIABETES MELLITUS (HCC): ICD-10-CM

## 2021-11-02 DIAGNOSIS — E11.22 CKD STAGE 4 DUE TO TYPE 2 DIABETES MELLITUS (HCC): ICD-10-CM

## 2021-11-02 DIAGNOSIS — I10 ESSENTIAL HYPERTENSION: ICD-10-CM

## 2021-11-02 DIAGNOSIS — E11.69 TYPE 2 DIABETES MELLITUS WITH OTHER SPECIFIED COMPLICATION, WITHOUT LONG-TERM CURRENT USE OF INSULIN (HCC): ICD-10-CM

## 2021-11-02 DIAGNOSIS — E11.9 DIABETIC EYE EXAM (HCC): ICD-10-CM

## 2021-11-02 DIAGNOSIS — Z01.00 DIABETIC EYE EXAM (HCC): ICD-10-CM

## 2021-11-02 PROCEDURE — 90471 IMMUNIZATION ADMIN: CPT | Performed by: INTERNAL MEDICINE

## 2021-11-02 PROCEDURE — 90686 IIV4 VACC NO PRSV 0.5 ML IM: CPT | Performed by: INTERNAL MEDICINE

## 2021-11-02 PROCEDURE — 83036 HEMOGLOBIN GLYCOSYLATED A1C: CPT | Performed by: INTERNAL MEDICINE

## 2021-11-02 PROCEDURE — 99396 PREV VISIT EST AGE 40-64: CPT | Performed by: INTERNAL MEDICINE

## 2021-11-02 PROCEDURE — 3078F DIAST BP <80 MM HG: CPT | Performed by: INTERNAL MEDICINE

## 2021-11-02 PROCEDURE — 3008F BODY MASS INDEX DOCD: CPT | Performed by: INTERNAL MEDICINE

## 2021-11-02 PROCEDURE — 3052F HG A1C>EQUAL 8.0%<EQUAL 9.0%: CPT | Performed by: INTERNAL MEDICINE

## 2021-11-02 PROCEDURE — 3074F SYST BP LT 130 MM HG: CPT | Performed by: INTERNAL MEDICINE

## 2021-11-02 NOTE — PROGRESS NOTES
Subjective:     Patient ID: Brady Talavera is a 50year old male. Patient here today for his checkup/annual checkup; also here for ffup of dm and hypertension. History/Other:   Review of Systems   Constitutional: Negative. HENT: Negative.     Eye reported) 1 mL 0     Allergies:  Losartan                Coughing    Past Medical History:   Diagnosis Date   • Diabetes ([facility]    • [facility] mellitus ([facility]    • Essential hypertension    • Hyperlipidemia    • Strabismus     OD XT      Past Surgical History wheezing or rales. Abdominal:      General: Abdomen is flat. Bowel sounds are normal. There is no distension. Palpations: Abdomen is soft. There is no mass. Tenderness: There is no abdominal tenderness. There is no guarding or rebound.    Muscul need for dm as well as bp control. Continued avoidance of nsaids.     (E78.00) Hypercholesteremia  Plan: LIPID PANEL        Check lipid panel; continue with low chol diet and chol med.      (I10) Essential hypertension  Plan: bp better controlled with highe

## 2021-11-05 ENCOUNTER — HOSPITAL ENCOUNTER (OUTPATIENT)
Dept: CV DIAGNOSTICS | Facility: HOSPITAL | Age: 56
Discharge: HOME | End: 2021-11-05
Attending: INTERNAL MEDICINE
Payer: MEDICAID

## 2021-11-05 DIAGNOSIS — I10 PRIMARY HYPERTENSION: ICD-10-CM

## 2021-11-05 DIAGNOSIS — E11.69 TYPE 2 DIABETES MELLITUS WITH OTHER SPECIFIED COMPLICATION, WITHOUT LONG-TERM CURRENT USE OF INSULIN (HCC): ICD-10-CM

## 2021-11-05 PROCEDURE — 93017 CV STRESS TEST TRACING ONLY: CPT | Performed by: INTERNAL MEDICINE

## 2021-11-05 PROCEDURE — 93018 CV STRESS TEST I&R ONLY: CPT | Performed by: INTERNAL MEDICINE

## 2021-11-08 NOTE — TELEPHONE ENCOUNTER
Pt calling in BP readings: Pt inquiring if Dr. Jj Michaud can sign and complete form needed to return to work.  Please call [phone]    Date: Readings/Pulse  11/3 140/92 71  11/4 133/94 69  11/5 130/87 72  11/6 140/72 88  11/7 138/72 73  11/8 138/87 88

## 2021-11-09 ENCOUNTER — TELEPHONE (OUTPATIENT)
Dept: GASTROENTEROLOGY | Facility: CLINIC | Age: 56
End: 2021-11-09

## 2021-11-09 DIAGNOSIS — D64.9 ANEMIA, UNSPECIFIED TYPE: ICD-10-CM

## 2021-11-09 DIAGNOSIS — Z12.11 COLON CANCER SCREENING: Primary | ICD-10-CM

## 2021-11-09 NOTE — TELEPHONE ENCOUNTER
GI Clinical Staff:     1. Please schedule chedule colonoscopy/EGD with [facility] [Diagnosis: screening, anemia]     2. Single dose Golytely     3. Medication adjustment:        A. Day BEFORE colonoscopy: HOLD glipizide     B.  Day OF colonoscopy: HOLD glipizide,

## 2021-11-09 NOTE — TELEPHONE ENCOUNTER
In RN inbox. Send with recent laboratory studies and my last note. Also repeat standing order in January 2022 and then see for follow-up.

## 2021-11-12 DIAGNOSIS — E78.00 HYPERCHOLESTEREMIA: Primary | ICD-10-CM

## 2021-11-12 RX ORDER — ATORVASTATIN CALCIUM 80 MG/1
80 TABLET, FILM COATED ORAL NIGHTLY
Qty: 90 TABLET | Refills: 0 | Status: SHIPPED | OUTPATIENT
Start: 2021-11-12

## 2021-11-17 RX ORDER — VALSARTAN AND HYDROCHLOROTHIAZIDE 160; 25 MG/1; MG/1
1 TABLET ORAL DAILY
Qty: 90 TABLET | Refills: 0 | Status: SHIPPED | OUTPATIENT
Start: 2021-11-17

## 2021-12-01 ENCOUNTER — PATIENT MESSAGE (OUTPATIENT)
Dept: GASTROENTEROLOGY | Facility: CLINIC | Age: 56
End: 2021-12-01

## 2021-12-02 NOTE — TELEPHONE ENCOUNTER
Scheduled for:  Colonoscopy 31927 EGD 23484  Provider Name:  Dr Jc Odom  Date: 03/02/2022   Location:   [facility]   Sedation: MAC   Time: 1000 (pt is aware to arrive at 0900)    Prep:  naren Oslen instructions sent via my chart on 12/01/2021   Meds/Allergies Re

## 2021-12-02 NOTE — TELEPHONE ENCOUNTER
Scheduled for:  Colonoscopy 22601 EGD   Provider Name:  Dr Shira Gallo  Date:  03/02/2022  Location:   [facility]   Sedation: MAC   Time: 1000 (pt is aware to arrive at 0900)    Prep:  Babatunde Arnett to be sent via my chart on 12/02/2021  Meds/Allergies Reconciled?:  Ph

## 2021-12-09 ENCOUNTER — MED REC SCAN ONLY (OUTPATIENT)
Dept: INTERNAL MEDICINE CLINIC | Facility: CLINIC | Age: 56
End: 2021-12-09

## 2021-12-15 ENCOUNTER — HOSPITAL ENCOUNTER (EMERGENCY)
Age: 56
Discharge: HOME | End: 2021-12-15
Attending: STUDENT IN AN ORGANIZED HEALTH CARE EDUCATION/TRAINING PROGRAM

## 2021-12-15 VITALS
TEMPERATURE: 98.5 F | HEART RATE: 89 BPM | OXYGEN SATURATION: 97 % | DIASTOLIC BLOOD PRESSURE: 90 MMHG | SYSTOLIC BLOOD PRESSURE: 195 MMHG | RESPIRATION RATE: 16 BRPM

## 2021-12-15 DIAGNOSIS — R04.0 ANTERIOR EPISTAXIS: Primary | ICD-10-CM

## 2021-12-15 PROCEDURE — 30901 CONTROL OF NOSEBLEED: CPT

## 2021-12-15 PROCEDURE — 30901 CONTROL OF NOSEBLEED: CPT | Performed by: STUDENT IN AN ORGANIZED HEALTH CARE EDUCATION/TRAINING PROGRAM

## 2021-12-15 PROCEDURE — X1094 NO CHARGE VISIT: HCPCS | Performed by: STUDENT IN AN ORGANIZED HEALTH CARE EDUCATION/TRAINING PROGRAM

## 2021-12-15 PROCEDURE — 99282 EMERGENCY DEPT VISIT SF MDM: CPT | Performed by: STUDENT IN AN ORGANIZED HEALTH CARE EDUCATION/TRAINING PROGRAM

## 2021-12-15 PROCEDURE — 10002803 HB RX 637: Performed by: STUDENT IN AN ORGANIZED HEALTH CARE EDUCATION/TRAINING PROGRAM

## 2021-12-15 PROCEDURE — 99283 EMERGENCY DEPT VISIT LOW MDM: CPT

## 2021-12-15 RX ORDER — OXYMETAZOLINE HYDROCHLORIDE 0.05 G/100ML
2 SPRAY NASAL ONCE
Status: COMPLETED | OUTPATIENT
Start: 2021-12-15 | End: 2021-12-15

## 2021-12-15 RX ADMIN — OXYMETAZOLINE HYDROCHLORIDE 2 SPRAY: 0.05 SPRAY NASAL at 19:15

## 2021-12-15 ASSESSMENT — ENCOUNTER SYMPTOMS
DIARRHEA: 0
COUGH: 0
EYE PAIN: 0
CHILLS: 0
FEVER: 0
SORE THROAT: 0
HEADACHES: 0
SHORTNESS OF BREATH: 0
ABDOMINAL PAIN: 0
FATIGUE: 0
NAUSEA: 0
CONFUSION: 0
VOICE CHANGE: 0
BRUISES/BLEEDS EASILY: 0
BACK PAIN: 0
POLYDIPSIA: 0
NERVOUS/ANXIOUS: 0

## 2021-12-15 ASSESSMENT — PAIN SCALES - GENERAL: PAINLEVEL_OUTOF10: 0

## 2021-12-16 RX ORDER — AMOXICILLIN AND CLAVULANATE POTASSIUM 875; 125 MG/1; MG/1
1 TABLET, FILM COATED ORAL EVERY 12 HOURS
Qty: 14 TABLET | Refills: 0 | Status: SHIPPED | OUTPATIENT
Start: 2021-12-16 | End: 2021-12-23

## 2021-12-18 RX ORDER — AMLODIPINE BESYLATE 10 MG/1
10 TABLET ORAL DAILY
Qty: 90 TABLET | Refills: 0 | Status: SHIPPED | OUTPATIENT
Start: 2021-12-18

## 2021-12-20 ENCOUNTER — OFFICE VISIT (OUTPATIENT)
Dept: OTOLARYNGOLOGY | Facility: CLINIC | Age: 56
End: 2021-12-20
Payer: MEDICAID

## 2021-12-20 VITALS — HEIGHT: 68 IN | BODY MASS INDEX: 31.52 KG/M2 | WEIGHT: 208 LBS | TEMPERATURE: 97 F

## 2021-12-20 DIAGNOSIS — R04.0 EPISTAXIS: Primary | ICD-10-CM

## 2021-12-20 PROCEDURE — 3008F BODY MASS INDEX DOCD: CPT | Performed by: OTOLARYNGOLOGY

## 2021-12-20 PROCEDURE — 99243 OFF/OP CNSLTJ NEW/EST LOW 30: CPT | Performed by: OTOLARYNGOLOGY

## 2021-12-20 NOTE — PROGRESS NOTES
Anibal Fox is a 49year old male.   Patient presents with:  Nose Problem: Patient Presents with: Left nostril nosebleed, packing inserted  on 12/15/21, per pt taking aspirin       HISTORY OF PRESENT ILLNESS  12/20/2021  Patient is seen at the request of ([facility]    • Diabetes mellitus ([facility]    • Essential hypertension    • Hyperlipidemia    • Strabismus     OD XT       Past Surgical History:   Procedure Laterality Date   • CATARACT     • CATARACT EXTRACTION W/  INTRAOCULAR LENS IMPLANT Bilateral 2017     Supraclavicular. Nose/Mouth/Throat abNormal  Normal external appearance of the nose after removal of the Rhino Rocket septum is dev and turbinates are normal with no polyps noted. Prominent vessels are not  noted .  Active bleeding is not noted PLAN    1. Epistaxis     I discussed the pathophysiology of epistaxis at length with the patient. Potential causes include :high blood pressure, inflammatory medications, blood thinners, nose picking, and dry conditions.  I emphasized the need for humidity

## 2022-02-24 ENCOUNTER — TELEPHONE (OUTPATIENT)
Dept: GASTROENTEROLOGY | Facility: CLINIC | Age: 57
End: 2022-02-24

## 2022-02-24 NOTE — TELEPHONE ENCOUNTER
Dr. Behzad Hernández (office on call),    Patient requesting prep for upcoming procedure. Orders pended below. Please sign if appropriate, thank you!

## 2022-02-27 ENCOUNTER — LAB ENCOUNTER (OUTPATIENT)
Dept: LAB | Facility: HOSPITAL | Age: 57
End: 2022-02-27
Attending: INTERNAL MEDICINE
Payer: MEDICAID

## 2022-02-27 DIAGNOSIS — Z20.822 ENCOUNTER FOR PREOPERATIVE SCREENING LABORATORY TESTING FOR COVID-19 VIRUS: ICD-10-CM

## 2022-02-27 DIAGNOSIS — Z01.812 ENCOUNTER FOR PREOPERATIVE SCREENING LABORATORY TESTING FOR COVID-19 VIRUS: ICD-10-CM

## 2022-02-28 LAB — SARS-COV-2 RNA RESP QL NAA+PROBE: NOT DETECTED

## 2022-03-02 ENCOUNTER — ANESTHESIA (OUTPATIENT)
Dept: ENDOSCOPY | Facility: HOSPITAL | Age: 57
End: 2022-03-02
Payer: MEDICAID

## 2022-03-02 ENCOUNTER — ANESTHESIA EVENT (OUTPATIENT)
Dept: ENDOSCOPY | Facility: HOSPITAL | Age: 57
End: 2022-03-02
Payer: MEDICAID

## 2022-03-02 ENCOUNTER — HOSPITAL ENCOUNTER (OUTPATIENT)
Facility: HOSPITAL | Age: 57
Setting detail: HOSPITAL OUTPATIENT SURGERY
Discharge: HOME | End: 2022-03-02
Attending: INTERNAL MEDICINE | Admitting: INTERNAL MEDICINE
Payer: MEDICAID

## 2022-03-02 VITALS
HEIGHT: 68 IN | TEMPERATURE: 97 F | DIASTOLIC BLOOD PRESSURE: 93 MMHG | OXYGEN SATURATION: 99 % | WEIGHT: 205 LBS | HEART RATE: 81 BPM | BODY MASS INDEX: 31.07 KG/M2 | RESPIRATION RATE: 13 BRPM | SYSTOLIC BLOOD PRESSURE: 175 MMHG

## 2022-03-02 DIAGNOSIS — Z01.812 ENCOUNTER FOR PREOPERATIVE SCREENING LABORATORY TESTING FOR COVID-19 VIRUS: Primary | ICD-10-CM

## 2022-03-02 DIAGNOSIS — D64.9 ANEMIA, UNSPECIFIED TYPE: ICD-10-CM

## 2022-03-02 DIAGNOSIS — Z20.822 ENCOUNTER FOR PREOPERATIVE SCREENING LABORATORY TESTING FOR COVID-19 VIRUS: Primary | ICD-10-CM

## 2022-03-02 DIAGNOSIS — Z12.11 COLON CANCER SCREENING: ICD-10-CM

## 2022-03-02 LAB — GLUCOSE BLDC GLUCOMTR-MCNC: 75 MG/DL (ref 70–99)

## 2022-03-02 PROCEDURE — 0DB98ZX EXCISION OF DUODENUM, VIA NATURAL OR ARTIFICIAL OPENING ENDOSCOPIC, DIAGNOSTIC: ICD-10-PCS | Performed by: INTERNAL MEDICINE

## 2022-03-02 PROCEDURE — 43239 EGD BIOPSY SINGLE/MULTIPLE: CPT | Performed by: INTERNAL MEDICINE

## 2022-03-02 PROCEDURE — 45385 COLONOSCOPY W/LESION REMOVAL: CPT | Performed by: INTERNAL MEDICINE

## 2022-03-02 PROCEDURE — 0DB38ZX EXCISION OF LOWER ESOPHAGUS, VIA NATURAL OR ARTIFICIAL OPENING ENDOSCOPIC, DIAGNOSTIC: ICD-10-PCS | Performed by: INTERNAL MEDICINE

## 2022-03-02 PROCEDURE — 0DBK8ZX EXCISION OF ASCENDING COLON, VIA NATURAL OR ARTIFICIAL OPENING ENDOSCOPIC, DIAGNOSTIC: ICD-10-PCS | Performed by: INTERNAL MEDICINE

## 2022-03-02 PROCEDURE — 0DB68ZX EXCISION OF STOMACH, VIA NATURAL OR ARTIFICIAL OPENING ENDOSCOPIC, DIAGNOSTIC: ICD-10-PCS | Performed by: INTERNAL MEDICINE

## 2022-03-02 RX ORDER — LIDOCAINE HYDROCHLORIDE 10 MG/ML
INJECTION, SOLUTION EPIDURAL; INFILTRATION; INTRACAUDAL; PERINEURAL AS NEEDED
Status: DISCONTINUED | OUTPATIENT
Start: 2022-03-02 | End: 2022-03-02 | Stop reason: SURG

## 2022-03-02 RX ORDER — SODIUM CHLORIDE, SODIUM LACTATE, POTASSIUM CHLORIDE, CALCIUM CHLORIDE 600; 310; 30; 20 MG/100ML; MG/100ML; MG/100ML; MG/100ML
INJECTION, SOLUTION INTRAVENOUS CONTINUOUS
Status: DISCONTINUED | OUTPATIENT
Start: 2022-03-02 | End: 2022-03-02

## 2022-03-02 RX ADMIN — SODIUM CHLORIDE, SODIUM LACTATE, POTASSIUM CHLORIDE, CALCIUM CHLORIDE: 600; 310; 30; 20 INJECTION, SOLUTION INTRAVENOUS at 10:42:00

## 2022-03-02 RX ADMIN — SODIUM CHLORIDE, SODIUM LACTATE, POTASSIUM CHLORIDE, CALCIUM CHLORIDE: 600; 310; 30; 20 INJECTION, SOLUTION INTRAVENOUS at 10:06:00

## 2022-03-02 RX ADMIN — LIDOCAINE HYDROCHLORIDE 50 MG: 10 INJECTION, SOLUTION EPIDURAL; INFILTRATION; INTRACAUDAL; PERINEURAL at 10:12:00

## 2022-03-10 ENCOUNTER — TELEPHONE (OUTPATIENT)
Dept: GASTROENTEROLOGY | Facility: CLINIC | Age: 57
End: 2022-03-10

## 2022-03-10 RX ORDER — OMEPRAZOLE 20 MG/1
20 CAPSULE, DELAYED RELEASE ORAL EVERY MORNING
Qty: 90 CAPSULE | Refills: 0 | Status: SHIPPED | OUTPATIENT
Start: 2022-03-10 | End: 2022-06-08

## 2022-03-10 NOTE — TELEPHONE ENCOUNTER
Health maintenance updated. Last colonoscopy done 3/2/22. 5 year recall placed into Pt Outreach, next due on 3/2/27 per Dr. Jessica Fabian.

## 2022-03-10 NOTE — TELEPHONE ENCOUNTER
D/w patient:    -Esophagitis (minimal) ->start PPI as needed 2-3x a week.  Risk of PPI discussed.  -Tubular adenoma of cln noted  -neg Hpylori  -neg celiac      GI Staff:   Recall CLN in 5 years

## 2022-03-23 RX ORDER — VALSARTAN AND HYDROCHLOROTHIAZIDE 160; 25 MG/1; MG/1
1 TABLET ORAL DAILY
Qty: 90 TABLET | Refills: 0 | Status: SHIPPED | OUTPATIENT
Start: 2022-03-23

## 2022-04-13 RX ORDER — METOPROLOL SUCCINATE 100 MG/1
TABLET, EXTENDED RELEASE ORAL
Qty: 90 TABLET | Refills: 0 | OUTPATIENT
Start: 2022-04-13

## 2022-04-13 NOTE — TELEPHONE ENCOUNTER
Verify dose. 2 different orders in the chart. Also patient way overdue for follow-up. Do standing order and see.

## 2022-05-04 RX ORDER — GLIPIZIDE 5 MG/1
5 TABLET ORAL
Qty: 180 TABLET | Refills: 1 | OUTPATIENT
Start: 2022-05-04

## 2022-05-04 RX ORDER — ATORVASTATIN CALCIUM 80 MG/1
80 TABLET, FILM COATED ORAL NIGHTLY
Qty: 90 TABLET | Refills: 0 | OUTPATIENT
Start: 2022-05-04

## 2022-05-04 RX ORDER — GLIPIZIDE 5 MG/1
5 TABLET ORAL
Qty: 180 TABLET | Refills: 0 | Status: SHIPPED | OUTPATIENT
Start: 2022-05-04

## 2022-05-04 RX ORDER — ATORVASTATIN CALCIUM 80 MG/1
80 TABLET, FILM COATED ORAL NIGHTLY
Qty: 90 TABLET | Refills: 1 | Status: SHIPPED | OUTPATIENT
Start: 2022-05-04

## 2022-05-04 NOTE — TELEPHONE ENCOUNTER
Await PCP approval on glipizide, pls advise, thanks in advance. Refill passed per [facility] protocol   Requested Prescriptions   Pending Prescriptions Disp Refills    glipiZIDE 5 MG Oral Tab 180 tablet 1     Sig: Take 1 tablet (5 mg total) by mouth 2 (two) times daily before meals. Diabetes Medication Protocol Failed - 5/3/2022  4:25 PM        Failed - Last A1C < 7.5 and within past 6 months     Lab Results   Component Value Date    A1C 8.7 (A) 11/02/2021               Failed - Appointment in past 6 or next 3 months        Failed - GFR  > 50     Lab Results   Component Value Date    GFRAA 27 (L) 10/27/2021                 Passed - GFR in the past 12 months           atorvastatin 80 MG Oral Tab 90 tablet 0     Sig: Take 1 tablet (80 mg total) by mouth nightly.         Cholesterol Medication Protocol Passed - 5/3/2022  4:25 PM        Passed - ALT in past 12 months        Passed - LDL in past 12 months        Passed - Last ALT < 80       Lab Results   Component Value Date    ALT 18 05/15/2021             Passed - Last LDL < 130     Lab Results   Component Value Date     (H) 11/09/2021               Passed - Appointment in past 12 or next 3 months

## 2022-05-05 RX ORDER — OMEPRAZOLE 20 MG/1
20 CAPSULE, DELAYED RELEASE ORAL EVERY MORNING
Qty: 90 CAPSULE | Refills: 0 | Status: SHIPPED | OUTPATIENT
Start: 2022-05-05 | End: 2022-08-03

## 2022-05-05 RX ORDER — OMEPRAZOLE 20 MG/1
20 CAPSULE, DELAYED RELEASE ORAL EVERY MORNING
Qty: 90 CAPSULE | Refills: 0 | OUTPATIENT
Start: 2022-05-05 | End: 2022-08-03

## 2022-07-09 ENCOUNTER — OFFICE VISIT (OUTPATIENT)
Dept: INTERNAL MEDICINE CLINIC | Facility: CLINIC | Age: 57
End: 2022-07-09
Payer: MEDICAID

## 2022-07-09 VITALS
BODY MASS INDEX: 31.83 KG/M2 | WEIGHT: 210 LBS | DIASTOLIC BLOOD PRESSURE: 84 MMHG | HEART RATE: 82 BPM | SYSTOLIC BLOOD PRESSURE: 154 MMHG | HEIGHT: 68 IN

## 2022-07-09 DIAGNOSIS — E11.9 DIABETIC EYE EXAM (HCC): ICD-10-CM

## 2022-07-09 DIAGNOSIS — I10 ESSENTIAL HYPERTENSION: ICD-10-CM

## 2022-07-09 DIAGNOSIS — Z01.00 DIABETIC EYE EXAM (HCC): ICD-10-CM

## 2022-07-09 DIAGNOSIS — E78.00 HYPERCHOLESTEREMIA: ICD-10-CM

## 2022-07-09 DIAGNOSIS — N18.4 CKD STAGE 4 DUE TO TYPE 2 DIABETES MELLITUS (HCC): ICD-10-CM

## 2022-07-09 DIAGNOSIS — E11.65 UNCONTROLLED TYPE 2 DIABETES MELLITUS WITH HYPERGLYCEMIA (HCC): Primary | ICD-10-CM

## 2022-07-09 DIAGNOSIS — E11.22 CKD STAGE 4 DUE TO TYPE 2 DIABETES MELLITUS (HCC): ICD-10-CM

## 2022-07-09 LAB
CARTRIDGE LOT#: 970 NUMERIC
HEMOGLOBIN A1C: 8.3 % (ref 4.3–5.6)

## 2022-07-09 PROCEDURE — 3052F HG A1C>EQUAL 8.0%<EQUAL 9.0%: CPT | Performed by: INTERNAL MEDICINE

## 2022-07-09 PROCEDURE — 99214 OFFICE O/P EST MOD 30 MIN: CPT | Performed by: INTERNAL MEDICINE

## 2022-07-09 PROCEDURE — 3008F BODY MASS INDEX DOCD: CPT | Performed by: INTERNAL MEDICINE

## 2022-07-09 PROCEDURE — 90677 PCV20 VACCINE IM: CPT | Performed by: INTERNAL MEDICINE

## 2022-07-09 PROCEDURE — 83036 HEMOGLOBIN GLYCOSYLATED A1C: CPT | Performed by: INTERNAL MEDICINE

## 2022-07-09 PROCEDURE — 90471 IMMUNIZATION ADMIN: CPT | Performed by: INTERNAL MEDICINE

## 2022-07-09 PROCEDURE — 3079F DIAST BP 80-89 MM HG: CPT | Performed by: INTERNAL MEDICINE

## 2022-07-09 PROCEDURE — 3077F SYST BP >= 140 MM HG: CPT | Performed by: INTERNAL MEDICINE

## 2022-07-09 RX ORDER — AMLODIPINE BESYLATE 10 MG/1
10 TABLET ORAL DAILY
Qty: 90 TABLET | Refills: 1 | Status: SHIPPED | OUTPATIENT
Start: 2022-07-09

## 2022-07-09 RX ORDER — GLIPIZIDE 5 MG/1
TABLET ORAL
Qty: 270 TABLET | Refills: 1 | Status: SHIPPED | OUTPATIENT
Start: 2022-07-09

## 2022-07-09 RX ORDER — ATORVASTATIN CALCIUM 80 MG/1
80 TABLET, FILM COATED ORAL NIGHTLY
Qty: 90 TABLET | Refills: 1 | Status: SHIPPED | OUTPATIENT
Start: 2022-07-09

## 2022-07-09 RX ORDER — VALSARTAN AND HYDROCHLOROTHIAZIDE 160; 25 MG/1; MG/1
1 TABLET ORAL DAILY
Qty: 90 TABLET | Refills: 1 | Status: SHIPPED | OUTPATIENT
Start: 2022-07-09

## 2022-07-09 RX ORDER — METOPROLOL SUCCINATE 100 MG/1
100 TABLET, EXTENDED RELEASE ORAL DAILY
Qty: 90 TABLET | Refills: 1 | Status: SHIPPED | OUTPATIENT
Start: 2022-07-09

## 2022-07-13 RX ORDER — LANCETS 33 GAUGE
1 EACH MISCELLANEOUS DAILY
Qty: 100 EACH | Refills: 0 | Status: SHIPPED | OUTPATIENT
Start: 2022-07-13

## 2022-07-13 RX ORDER — BLOOD-GLUCOSE METER
EACH MISCELLANEOUS
Qty: 1 KIT | Refills: 0 | Status: SHIPPED | OUTPATIENT
Start: 2022-07-13

## 2022-07-22 ENCOUNTER — TELEPHONE (OUTPATIENT)
Dept: INTERNAL MEDICINE CLINIC | Facility: CLINIC | Age: 57
End: 2022-07-22

## 2022-07-22 DIAGNOSIS — E11.65 UNCONTROLLED TYPE 2 DIABETES MELLITUS WITH HYPERGLYCEMIA (HCC): Primary | ICD-10-CM

## 2022-07-22 NOTE — TELEPHONE ENCOUNTER
Patient states he was told to increase medication  Glipizide, patient feels is a little strong for him  Patient is concerned and would like to speak to Dr Karle Meckel

## 2022-07-23 PROCEDURE — 3060F POS MICROALBUMINURIA REV: CPT | Performed by: INTERNAL MEDICINE

## 2022-07-24 LAB
ABSOLUTE BASOPHILS: 42 CELLS/UL (ref 0–200)
ABSOLUTE EOSINOPHILS: 322 CELLS/UL (ref 15–500)
ABSOLUTE LYMPHOCYTES: 1513 CELLS/UL (ref 850–3900)
ABSOLUTE MONOCYTES: 426 CELLS/UL (ref 200–950)
ABSOLUTE NEUTROPHILS: 2896 CELLS/UL (ref 1500–7800)
ALBUMIN/GLOBULIN RATIO: 1.1 (CALC) (ref 1–2.5)
ALBUMIN: 3.7 G/DL (ref 3.6–5.1)
ALKALINE PHOSPHATASE: 91 U/L (ref 35–144)
ALT: 13 U/L (ref 9–46)
APPEARANCE: CLEAR
AST: 18 U/L (ref 10–35)
BASOPHILS: 0.8 %
BILIRUBIN, TOTAL: 0.5 MG/DL (ref 0.2–1.2)
BILIRUBIN: NEGATIVE
BUN/CREATININE RATIO: 10 (CALC) (ref 6–22)
BUN: 42 MG/DL (ref 7–25)
CALCIUM: 8.5 MG/DL (ref 8.6–10.3)
CARBON DIOXIDE: 22 MMOL/L (ref 20–32)
CHLORIDE: 106 MMOL/L (ref 98–110)
CHOL/HDLC RATIO: 3.9 (CALC)
CHOLESTEROL, TOTAL: 136 MG/DL
COLOR: YELLOW
CREATININE, RANDOM URINE: 111 MG/DL (ref 20–320)
CREATININE: 4.2 MG/DL (ref 0.7–1.3)
EGFR: 16 ML/MIN/1.73M2
EOSINOPHILS: 6.2 %
GLOBULIN: 3.3 G/DL (CALC) (ref 1.9–3.7)
GLUCOSE: 113 MG/DL (ref 65–99)
GLUCOSE: NEGATIVE
HDL CHOLESTEROL: 35 MG/DL
HEMATOCRIT: 31.2 % (ref 38.5–50)
HEMOGLOBIN: 10.2 G/DL (ref 13.2–17.1)
KETONES: NEGATIVE
LDL-CHOLESTEROL: 79 MG/DL (CALC)
LEUKOCYTE ESTERASE: NEGATIVE
LYMPHOCYTES: 29.1 %
MCH: 28.4 PG (ref 27–33)
MCHC: 32.7 G/DL (ref 32–36)
MCV: 86.9 FL (ref 80–100)
MICROALBUMIN/CREATININE RATIO, RANDOM URINE: 2331 MCG/MG CREAT
MICROALBUMIN: 258.7 MG/DL
MONOCYTES: 8.2 %
MPV: 9.3 FL (ref 7.5–12.5)
NEUTROPHILS: 55.7 %
NITRITE: NEGATIVE
NON-HDL CHOLESTEROL: 101 MG/DL (CALC)
PH: 6 (ref 5–8)
PLATELET COUNT: 354 THOUSAND/UL (ref 140–400)
POTASSIUM: 4.4 MMOL/L (ref 3.5–5.3)
PROTEIN, TOTAL: 7 G/DL (ref 6.1–8.1)
RDW: 14.7 % (ref 11–15)
RED BLOOD CELL COUNT: 3.59 MILLION/UL (ref 4.2–5.8)
SODIUM: 137 MMOL/L (ref 135–146)
SPECIFIC GRAVITY: 1.01 (ref 1–1.03)
TRIGLYCERIDES: 128 MG/DL
WHITE BLOOD CELL COUNT: 5.2 THOUSAND/UL (ref 3.8–10.8)

## 2022-07-25 ENCOUNTER — TELEPHONE (OUTPATIENT)
Dept: INTERNAL MEDICINE CLINIC | Facility: CLINIC | Age: 57
End: 2022-07-25

## 2022-07-25 NOTE — TELEPHONE ENCOUNTER
Left message to call back and check his MyChart message. MyChart not read yet. NO PHONE RESPONSE LETTER sent.      From   Micah Sanchez RN To   Ivan Buck and Delivered   7/23/2022 12:36 PM   Last Read in [address]   Not Read

## 2022-07-25 NOTE — TELEPHONE ENCOUNTER
Spoke to patient and relayed Dr. Cuca Waite message below. Patient verbalized understanding and had no further questions.      Kip Cope, [address] [facility] [address] [id number] [facility] [phone]

## 2022-07-25 NOTE — TELEPHONE ENCOUNTER
Ok to keep the dose he is taking now then. i would recommend seeing endo for his dm not well controlled. We can refer him to DR Seaman and partners.    Also see my lab result note for the patient  thanks

## 2022-07-26 ENCOUNTER — MED REC SCAN ONLY (OUTPATIENT)
Dept: INTERNAL MEDICINE CLINIC | Facility: CLINIC | Age: 57
End: 2022-07-26

## 2022-09-16 ENCOUNTER — TELEPHONE (OUTPATIENT)
Dept: SURGERY | Facility: CLINIC | Age: 57
End: 2022-09-16

## 2022-09-16 ENCOUNTER — OFFICE VISIT (OUTPATIENT)
Dept: NEPHROLOGY | Facility: CLINIC | Age: 57
End: 2022-09-16
Payer: COMMERCIAL

## 2022-09-16 ENCOUNTER — OFFICE VISIT (OUTPATIENT)
Dept: INTERNAL MEDICINE CLINIC | Facility: CLINIC | Age: 57
End: 2022-09-16
Payer: COMMERCIAL

## 2022-09-16 VITALS
OXYGEN SATURATION: 99 % | TEMPERATURE: 97 F | WEIGHT: 213.81 LBS | HEIGHT: 68 IN | DIASTOLIC BLOOD PRESSURE: 80 MMHG | BODY MASS INDEX: 32.4 KG/M2 | SYSTOLIC BLOOD PRESSURE: 136 MMHG

## 2022-09-16 VITALS
BODY MASS INDEX: 32.58 KG/M2 | HEIGHT: 68 IN | DIASTOLIC BLOOD PRESSURE: 98 MMHG | HEART RATE: 81 BPM | WEIGHT: 215 LBS | SYSTOLIC BLOOD PRESSURE: 161 MMHG

## 2022-09-16 DIAGNOSIS — E11.22 CKD STAGE 4 DUE TO TYPE 2 DIABETES MELLITUS (HCC): ICD-10-CM

## 2022-09-16 DIAGNOSIS — E11.65 UNCONTROLLED TYPE 2 DIABETES MELLITUS WITH HYPERGLYCEMIA (HCC): Primary | ICD-10-CM

## 2022-09-16 DIAGNOSIS — Z13.6 SCREENING FOR HEART DISEASE: ICD-10-CM

## 2022-09-16 DIAGNOSIS — N18.4 CKD STAGE 4 DUE TO TYPE 2 DIABETES MELLITUS (HCC): ICD-10-CM

## 2022-09-16 DIAGNOSIS — E78.00 HYPERCHOLESTEREMIA: ICD-10-CM

## 2022-09-16 DIAGNOSIS — N18.4 CKD (CHRONIC KIDNEY DISEASE) STAGE 4, GFR 15-29 ML/MIN (HCC): Primary | ICD-10-CM

## 2022-09-16 DIAGNOSIS — I10 ESSENTIAL HYPERTENSION: ICD-10-CM

## 2022-09-16 LAB
CARTRIDGE LOT#: ABNORMAL NUMERIC
HEMOGLOBIN A1C: 6.5 % (ref 4.3–5.6)

## 2022-09-16 PROCEDURE — 3044F HG A1C LEVEL LT 7.0%: CPT | Performed by: INTERNAL MEDICINE

## 2022-09-16 PROCEDURE — 36415 COLL VENOUS BLD VENIPUNCTURE: CPT | Performed by: INTERNAL MEDICINE

## 2022-09-16 PROCEDURE — 83036 HEMOGLOBIN GLYCOSYLATED A1C: CPT | Performed by: INTERNAL MEDICINE

## 2022-09-16 PROCEDURE — 3075F SYST BP GE 130 - 139MM HG: CPT | Performed by: INTERNAL MEDICINE

## 2022-09-16 PROCEDURE — 3080F DIAST BP >= 90 MM HG: CPT | Performed by: INTERNAL MEDICINE

## 2022-09-16 PROCEDURE — 99214 OFFICE O/P EST MOD 30 MIN: CPT | Performed by: INTERNAL MEDICINE

## 2022-09-16 PROCEDURE — 3008F BODY MASS INDEX DOCD: CPT | Performed by: INTERNAL MEDICINE

## 2022-09-16 PROCEDURE — 3077F SYST BP >= 140 MM HG: CPT | Performed by: INTERNAL MEDICINE

## 2022-09-16 PROCEDURE — 99213 OFFICE O/P EST LOW 20 MIN: CPT | Performed by: INTERNAL MEDICINE

## 2022-09-16 PROCEDURE — 3079F DIAST BP 80-89 MM HG: CPT | Performed by: INTERNAL MEDICINE

## 2022-09-16 RX ORDER — METOPROLOL SUCCINATE 100 MG/1
150 TABLET, EXTENDED RELEASE ORAL DAILY
Qty: 135 TABLET | Refills: 0 | Status: SHIPPED | OUTPATIENT
Start: 2022-09-16

## 2022-09-16 NOTE — PATIENT INSTRUCTIONS
Increase Metroprolol to 150 mg daily. Check your blood pressures and heart rates daily and call me in 10 days. Repeat your kidney blood test in 1 month.

## 2022-09-16 NOTE — TELEPHONE ENCOUNTER
Received fax from [address] with a refill request for TRI-MIX. Order placed on COLLEEN Villarreal's desk.      Please advise    Future Appointments   Date Time Provider Mihaela Caban   9/16/2022  1:00 PM Coni Alonzo MD [facility]

## 2022-09-16 NOTE — PROGRESS NOTES
09/16/22        Patient: Chavo Lomas   YOB: 1965   Date of Visit: 9/16/2022       Dear  Dr. Prerna Dean MD,      Thank you for referring Chavo Lomsa to my practice. Please find my assessment and plan below. As you know he is a 72-year-old -American male with a history of adult onset diabetes mellitus diagnosed in 2004, history of hypertension diagnosed in 2000 and who I now had the pleasure of seeing for follow-up of chronic kidney disease stage IV associated with nephrotic range proteinuria thought to be secondary to diabetic nephropathy. Again the patient has been poorly compliant with follow-up. Last seen in July 2021. Checks blood pressures occasionally and systolics are usually in the 1 50-1 60 range with diastolics in the  range. He states his diabetes is better and his last A1c was down to 6.5. Otherwise feeling well. Notes occasional edema at the end of the day but better when he awakens in the morning. On physical exam his blood pressure was 161/98 with a pulse of 81 he weighed 215 pounds. Neck was supple without JVD. Lungs were clear. Heart revealed a regular rate and rhythm with an S4 but no gallops, murmurs or rubs. Abdomen was soft, flat, nontender without organomegaly, masses or bruits. Extremities revealed trace edema bilaterally. I reviewed his most recent labs done on July 23, 2022. Creatinine has increased significantly now up to 4.20 with an estimated GFR of 16 cc/min. Electrolytes were good. Hemoglobin 10.2. Urinalysis shows 3+ proteinuria but otherwise was unremarkable with a urine microalbumin to creatinine ratio 2331. Blood albumin 3.7. I therefore informed the patient that there has been significant deterioration in his renal function. The significance of a GFR of 16 cc/min and the indications for dialysis have been reviewed in detail. Reinforced the importance of both blood pressure and blood sugar control.   We will increase his Toprol-XL to 150 mg daily. Check blood pressures and heart rates daily and call me in 10 days. Repeat CBC and renal panel in 1 month. Again reinforced the importance of compliance. Return in 3 months. Thank you again for allowing me to participate in the care of your patient. If you have any questions please feel free to call.            Sincerely,   Karen Gasca MD   [facility]  [address]  [place] [address] [place] [address]  [address] [facility] [id number]    Document electronically generated by:  Karen Gasca MD

## 2022-09-21 NOTE — TELEPHONE ENCOUNTER
Order signed by [facility]. Faxed to [facility]. FAX # [phone]    Copy sent to scanning.

## 2023-06-02 RX ORDER — METOPROLOL SUCCINATE 100 MG/1
TABLET, EXTENDED RELEASE ORAL
Qty: 135 TABLET | Refills: 0 | Status: SHIPPED | OUTPATIENT
Start: 2023-06-02

## 2023-06-02 RX ORDER — GLIPIZIDE 5 MG/1
TABLET ORAL
Qty: 270 TABLET | Refills: 0 | Status: SHIPPED | OUTPATIENT
Start: 2023-06-02

## 2023-06-02 RX ORDER — VALSARTAN AND HYDROCHLOROTHIAZIDE 160; 25 MG/1; MG/1
TABLET ORAL
Qty: 90 TABLET | Refills: 0 | Status: SHIPPED | OUTPATIENT
Start: 2023-06-02

## 2023-06-02 RX ORDER — AMLODIPINE BESYLATE 10 MG/1
TABLET ORAL
Qty: 90 TABLET | Refills: 0 | Status: SHIPPED | OUTPATIENT
Start: 2023-06-02

## 2023-06-02 NOTE — TELEPHONE ENCOUNTER
Please review. Protocol failed / Has no protocol. Requested Prescriptions   Pending Prescriptions Disp Refills    VALSARTAN-HYDROCHLOROTHIAZIDE 160-25 MG Oral Tab [Pharmacy Med Name: Valsartan/Hydrochlorothiazide 160-25 Mg Tab Stefania] 90 tablet 0     Sig: TAKE ONE TABLET BY MOUTH ONCE DAILY       Hypertensive Medications Protocol Failed - 6/2/2023 10:52 AM        Failed - Last BP reading less than 140/90     BP Readings from Last 1 Encounters:  09/16/22 : (!) 161/98                Failed - CMP or BMP in past 6 months     No results found for this or any previous visit (from the past 4392 hour(s)).               Failed - In person appointment or virtual visit in the past 6 months     Recent Outpatient Visits              8 months ago Uncontrolled type 2 diabetes mellitus with hyperglycemia [facility])    [address], [facility], Miki Kearney MD    Office Visit    8 months ago CKD (chronic kidney disease) stage 4, GFR 15-29 ml/min [facility])    [facility], [address] [place] [address], Brandon Bailey MD    Office Visit    10 months ago Uncontrolled type 2 diabetes mellitus with hyperglycemia [facility])    [address], [address], Bethanie Cabot, MD    Office Visit    1 year ago Epistaxis    [facility], Tracee Rose MD    Office Visit    1 year ago Annual physical exam    [address], Bethanie Cabot, MD    Office Visit          Future Appointments         Provider Department Appt Notes    In 2 weeks Osmany Kearney MD [address], [place] Blood pressure    In 4 weeks Daryle Schwalbe, MD [address], [address] [place] [address] or GFRAA > 50     GFR Evaluation              Passed - In person appointment in the past 12 or next 3 months     Recent Outpatient Visits              8 months ago Uncontrolled type 2 diabetes mellitus with hyperglycemia [facility])    [address], [address], Miki Ruth MD    Office Visit    8 months ago CKD (chronic kidney disease) stage 4, GFR 15-29 ml/min [facility])    [facility], [address] [place] [address], Chuckie Valerio MD    Office Visit    10 months ago Uncontrolled type 2 diabetes mellitus with hyperglycemia [facility])    [address], [address], Collette Leyland, MD    Office Visit    1 year ago Epistaxis    [address] [facility], [facility], Naga Foss MD    Office Visit    1 year ago Annual physical exam    [address] [facility], Collette Leyland, MD    Office Visit          Future Appointments         Provider Department Appt Notes    In 2 weeks Jason Ruth MD [address] [facility], Miki Blood pressure    In 4 weeks Herbert Gaviria MD [address], [address] [place] [address]                  AMLODIPINE 10 MG Oral Tab [Pharmacy Med Name: Amlodipine Besylate 10 Mg Tab Cipl] 90 tablet 0     Sig: TAKE ONE TABLET BY MOUTH ONCE DAILY       Hypertensive Medications Protocol Failed - 6/2/2023 10:52 AM        Failed - Last BP reading less than 140/90     BP Readings from Last 1 Encounters:  09/16/22 : (!) 161/98                Failed - CMP or BMP in past 6 months     No results found for this or any previous visit (from the past 4392 hour(s)).               Failed - In person appointment or virtual visit in the past 6 months     Recent Outpatient Visits              8 months ago Uncontrolled type 2 diabetes mellitus with hyperglycemia [facility])    [address], [address], Miki Ruth MD    Office Visit    8 months ago CKD (chronic kidney disease) stage 4, GFR 15-29 ml/min [facility])    [facility], [address] [place] [address] MD    Office Visit    10 months ago Uncontrolled type 2 diabetes mellitus with hyperglycemia [facility])    [address] [facility], [place] Raymundo Gonzalez MD    Office Visit    1 year ago Epistaxis    [address], [address] [facility] [address], [facility], Gautam Lisa MD    Office Visit    1 year ago Annual physical exam    [address], [address], Leonila Heath MD    Office Visit          Future Appointments         Provider Department Appt Notes    In 2 weeks Raymundo Gonzalez MD [address] [place] [address], Miki Blood pressure    In 4 weeks Wellington Vinson MD [address], [facility] or GFRAA > 50     GFR Evaluation              Passed - In person appointment in the past 12 or next 3 months     Recent Outpatient Visits              8 months ago Uncontrolled type 2 diabetes mellitus with hyperglycemia [facility])    [address], [address], Miki Gonzalez MD    Office Visit    8 months ago CKD (chronic kidney disease) stage 4, GFR 15-29 ml/min [facility])    [address], [address] [facility], oMre Merchant MD    Office Visit    10 months ago Uncontrolled type 2 diabetes mellitus with hyperglycemia [facility])    [address], [address], Leonila Heath MD    Office Visit    1 year ago Epistaxis    [address], [address] [facility] [address], [facility], Gautam Lisa MD    Office Visit    1 year ago Annual physical exam    [address], [address], Leonila Heath MD    Office Visit          Future Appointments         Provider Department Appt Notes    In 2 weeks Raymundo Gonzalez MD [address] [place] [address], Miki Blood pressure    In 4 weeks [facility], [address]                  GLIPIZIDE 5 MG Oral Tab [Pharmacy Med Name: Glipizide 5 Mg Tab Acta] 270 tablet 0     Sig: TAKE TWO TABLETS BY MOUTH IN THE MORNING AND ONE IN THE P.M.        Diabetes Medication Protocol Failed - 6/2/2023 10:52 AM        Failed - Last A1C < 7.5 and within past 6 months     Lab Results   Component Value Date    A1C 6.5 (A) 09/16/2022               Failed - EGFRCR or GFRAA > 50     GFR Evaluation              Passed - In person appointment or virtual visit in the past 6 mos or appointment in next 3 mos     Recent Outpatient Visits              8 months ago Uncontrolled type 2 diabetes mellitus with hyperglycemia [facility])    Bro Arnold, Rob Walter, Miki Ruth MD    Office Visit    8 months ago CKD (chronic kidney disease) stage 4, GFR 15-29 ml/min ([facility] [address]    [facility], [address] [place] [address], Chuckie Valerio MD    Office Visit    10 months ago Uncontrolled type 2 diabetes mellitus with hyperglycemia [facility])    [address], Collette Leyland, MD    Office Visit    1 year ago Epistaxis    Bro Arnold, [address], [facility], Naga Foss MD    Office Visit    1 year ago Annual physical exam    [address], Collette Leyland, MD    Office Visit          Future Appointments         Provider Department Appt Notes    In 2 weeks Jason Ruth MD [address], Miki Blood pressure    In 4 weeks MD Bro Figueroa, [address] [place] [address]                Passed - GFR in the past 12 months           Future Appointments         Provider Department Appt Notes    In 2 weeks Jason Ruth MD [address], Miki Blood pressure    In 4 weeks MD Bro Fig[address]            Recent Outpatient Visits              8 months ago Uncontrolled type 2 diabetes mellitus with hyperglycemia [facility])    [facility], [address], Miki Govea MD    Office Visit    8 months ago CKD (chronic kidney disease) stage 4, GFR 15-29 ml/min [facility])    Ed Mathias MD    Office Visit    10 months ago Uncontrolled type 2 diabetes mellitus with hyperglycemia [facility])    Sonido Johnson MD    Office Visit    1 year ago Epistaxis    [facility]    Office Visit    1 year ago Annual physical exam    Sonido Johnson MD    Office Visit

## 2023-06-16 ENCOUNTER — OFFICE VISIT (OUTPATIENT)
Dept: INTERNAL MEDICINE CLINIC | Facility: CLINIC | Age: 58
End: 2023-06-16

## 2023-06-16 VITALS
HEIGHT: 68 IN | BODY MASS INDEX: 31.71 KG/M2 | TEMPERATURE: 96 F | OXYGEN SATURATION: 97 % | SYSTOLIC BLOOD PRESSURE: 168 MMHG | WEIGHT: 209.19 LBS | HEART RATE: 73 BPM | DIASTOLIC BLOOD PRESSURE: 80 MMHG

## 2023-06-16 DIAGNOSIS — R35.1 NOCTURIA: ICD-10-CM

## 2023-06-16 DIAGNOSIS — Z12.5 PROSTATE CANCER SCREENING: ICD-10-CM

## 2023-06-16 DIAGNOSIS — E11.21 CONTROLLED TYPE 2 DIABETES MELLITUS WITH DIABETIC NEPHROPATHY, WITHOUT LONG-TERM CURRENT USE OF INSULIN (HCC): ICD-10-CM

## 2023-06-16 DIAGNOSIS — Z01.00 DIABETIC EYE EXAM (HCC): ICD-10-CM

## 2023-06-16 DIAGNOSIS — E11.22 CKD STAGE 4 DUE TO TYPE 2 DIABETES MELLITUS (HCC): ICD-10-CM

## 2023-06-16 DIAGNOSIS — E11.9 DIABETIC EYE EXAM (HCC): ICD-10-CM

## 2023-06-16 DIAGNOSIS — N18.4 CKD STAGE 4 DUE TO TYPE 2 DIABETES MELLITUS (HCC): ICD-10-CM

## 2023-06-16 DIAGNOSIS — E11.3493 SEVERE NONPROLIFERATIVE DIABETIC RETINOPATHY OF BOTH EYES ASSOCIATED WITH TYPE 2 DIABETES MELLITUS, MACULAR EDEMA PRESENCE UNSPECIFIED (HCC): ICD-10-CM

## 2023-06-16 DIAGNOSIS — Z12.11 COLON CANCER SCREENING: ICD-10-CM

## 2023-06-16 DIAGNOSIS — Z00.00 ANNUAL PHYSICAL EXAM: Primary | ICD-10-CM

## 2023-06-16 DIAGNOSIS — E78.2 MIXED HYPERLIPIDEMIA: ICD-10-CM

## 2023-06-16 DIAGNOSIS — I10 ESSENTIAL HYPERTENSION: ICD-10-CM

## 2023-06-16 LAB
CARTRIDGE LOT#: ABNORMAL NUMERIC
HEMOGLOBIN A1C: 5.8 % (ref 4.3–5.6)

## 2023-06-16 RX ORDER — VALSARTAN AND HYDROCHLOROTHIAZIDE 160; 25 MG/1; MG/1
1 TABLET ORAL DAILY
Qty: 90 TABLET | Refills: 1 | Status: SHIPPED | OUTPATIENT
Start: 2023-06-16

## 2023-06-16 RX ORDER — ATORVASTATIN CALCIUM 80 MG/1
80 TABLET, FILM COATED ORAL NIGHTLY
Qty: 90 TABLET | Refills: 1 | Status: SHIPPED | OUTPATIENT
Start: 2023-06-16

## 2023-07-03 ENCOUNTER — TELEPHONE (OUTPATIENT)
Dept: SURGERY | Facility: CLINIC | Age: 58
End: 2023-07-03

## 2023-07-30 ENCOUNTER — PATIENT MESSAGE (OUTPATIENT)
Dept: NEPHROLOGY | Facility: CLINIC | Age: 58
End: 2023-07-30

## 2023-07-31 NOTE — TELEPHONE ENCOUNTER
From: Severiano Clark  To: Juancarlos Solis MD  Sent: 7/30/2023 10:40 AM CDT  Subject: Urgent     I have had nausea and a little vomiting, muscle cramps, loss of appetite n fatigue.  I need to come into see u asap

## 2023-07-31 NOTE — TELEPHONE ENCOUNTER
Strongly advise him to do labs as ordered ASAP or if he is feeling really bad send to ER. Has not done labs for over 1 year.

## 2023-07-31 NOTE — TELEPHONE ENCOUNTER
Dr. Fadia Levy, please see mychart msg. Advised to see pcp or urgent care. Is scheduled to see you 8-11. Last seen 9-16-22.

## 2023-08-01 ENCOUNTER — APPOINTMENT (OUTPATIENT)
Dept: ULTRASOUND IMAGING | Facility: HOSPITAL | Age: 58
End: 2023-08-01
Attending: EMERGENCY MEDICINE
Payer: COMMERCIAL

## 2023-08-01 ENCOUNTER — TELEPHONE (OUTPATIENT)
Dept: INTERNAL MEDICINE CLINIC | Facility: CLINIC | Age: 58
End: 2023-08-01

## 2023-08-01 ENCOUNTER — APPOINTMENT (OUTPATIENT)
Dept: CT IMAGING | Facility: HOSPITAL | Age: 58
End: 2023-08-01
Attending: EMERGENCY MEDICINE
Payer: COMMERCIAL

## 2023-08-01 ENCOUNTER — APPOINTMENT (OUTPATIENT)
Dept: GENERAL RADIOLOGY | Facility: HOSPITAL | Age: 58
End: 2023-08-01
Attending: EMERGENCY MEDICINE
Payer: COMMERCIAL

## 2023-08-01 ENCOUNTER — HOSPITAL ENCOUNTER (INPATIENT)
Facility: HOSPITAL | Age: 58
LOS: 3 days | Discharge: HOME | End: 2023-08-04
Attending: EMERGENCY MEDICINE | Admitting: HOSPITALIST
Payer: COMMERCIAL

## 2023-08-01 DIAGNOSIS — N18.9 ACUTE RENAL FAILURE SUPERIMPOSED ON CHRONIC KIDNEY DISEASE, UNSPECIFIED CKD STAGE, UNSPECIFIED ACUTE RENAL FAILURE TYPE: Primary | ICD-10-CM

## 2023-08-01 DIAGNOSIS — K52.9 POSTPRANDIAL DIARRHEA: ICD-10-CM

## 2023-08-01 DIAGNOSIS — N17.9 ACUTE RENAL FAILURE SUPERIMPOSED ON CHRONIC KIDNEY DISEASE, UNSPECIFIED CKD STAGE, UNSPECIFIED ACUTE RENAL FAILURE TYPE: Primary | ICD-10-CM

## 2023-08-01 DIAGNOSIS — D64.9 NORMOCYTIC ANEMIA: ICD-10-CM

## 2023-08-01 DIAGNOSIS — N19 UREMIA: ICD-10-CM

## 2023-08-01 DIAGNOSIS — E87.20 METABOLIC ACIDOSIS: ICD-10-CM

## 2023-08-01 LAB
ABSOLUTE BASOPHILS: 32 CELLS/UL (ref 0–200)
ABSOLUTE EOSINOPHILS: 329 CELLS/UL (ref 15–500)
ABSOLUTE LYMPHOCYTES: 886 CELLS/UL (ref 850–3900)
ABSOLUTE MONOCYTES: 249 CELLS/UL (ref 200–950)
ABSOLUTE NEUTROPHILS: 2006 CELLS/UL (ref 1500–7800)
ALBUMIN SERPL-MCNC: 3 G/DL (ref 3.4–5)
ALBUMIN/GLOB SERPL: 0.8 {RATIO} (ref 1–2)
ALBUMIN/GLOBULIN RATIO: 1.1 (CALC) (ref 1–2.5)
ALBUMIN: 3.3 G/DL (ref 3.6–5.1)
ALKALINE PHOSPHATASE: 43 U/L (ref 35–144)
ALP LIVER SERPL-CCNC: 44 U/L
ALT SERPL-CCNC: 21 U/L
ALT: 13 U/L (ref 9–46)
ANION GAP SERPL CALC-SCNC: 17 MMOL/L (ref 0–18)
ANTIBODY SCREEN: NEGATIVE
APPEARANCE: CLEAR
AST SERPL-CCNC: 29 U/L (ref 15–37)
AST: 26 U/L (ref 10–35)
ATRIAL RATE: 71 BPM
BASE EXCESS BLD CALC-SCNC: -12.8 MMOL/L (ref ?–2)
BASOPHILS # BLD AUTO: 0.02 X10(3) UL (ref 0–0.2)
BASOPHILS NFR BLD AUTO: 0.6 %
BASOPHILS: 0.9 %
BILIRUB SERPL-MCNC: 0.4 MG/DL (ref 0.1–2)
BILIRUB UR QL: NEGATIVE
BILIRUBIN, TOTAL: 0.3 MG/DL (ref 0.2–1.2)
BILIRUBIN: NEGATIVE
BUN BLD-MCNC: 155 MG/DL (ref 7–18)
BUN/CREAT SERPL: 9.2 (ref 10–20)
BUN/CREATININE RATIO: 10 (CALC) (ref 6–22)
BUN: 152 MG/DL (ref 7–25)
CALCIUM BLD-MCNC: 6.7 MG/DL (ref 8.5–10.1)
CALCIUM: 6.3 MG/DL (ref 8.6–10.3)
CARBON DIOXIDE: 16 MMOL/L (ref 20–32)
CHLORIDE SERPL-SCNC: 101 MMOL/L (ref 98–112)
CHLORIDE: 100 MMOL/L (ref 98–110)
CHOL/HDLC RATIO: 5.2 (CALC)
CHOLESTEROL, TOTAL: 233 MG/DL
CLARITY UR: CLEAR
CO2 SERPL-SCNC: 13 MMOL/L (ref 21–32)
COLOR UR: COLORLESS
COLOR: YELLOW
CREAT BLD-MCNC: 16.9 MG/DL
CREATININE, RANDOM URINE: 105 MG/DL (ref 20–320)
CREATININE: 15.74 MG/DL (ref 0.7–1.3)
CRP SERPL-MCNC: <0.29 MG/DL (ref ?–0.3)
DEPRECATED HBV CORE AB SER IA-ACNC: 140.9 NG/ML
DEPRECATED RDW RBC AUTO: 41.5 FL (ref 35.1–46.3)
EGFR: 3 ML/MIN/1.73M2
EGFRCR SERPLBLD CKD-EPI 2021: 3 ML/MIN/1.73M2 (ref 60–?)
EOSINOPHIL # BLD AUTO: 0.2 X10(3) UL (ref 0–0.7)
EOSINOPHIL NFR BLD AUTO: 5.7 %
EOSINOPHILS: 9.4 %
ERYTHROCYTE [DISTWIDTH] IN BLOOD BY AUTOMATED COUNT: 13.7 % (ref 11–15)
GLOBULIN PLAS-MCNC: 4 G/DL (ref 2.8–4.4)
GLOBULIN: 2.9 G/DL (CALC) (ref 1.9–3.7)
GLUCOSE BLD-MCNC: 68 MG/DL (ref 70–99)
GLUCOSE BLDC GLUCOMTR-MCNC: 108 MG/DL (ref 70–99)
GLUCOSE BLDC GLUCOMTR-MCNC: 81 MG/DL (ref 70–99)
GLUCOSE UR-MCNC: NORMAL MG/DL
GLUCOSE: 71 MG/DL (ref 65–99)
GLUCOSE: NEGATIVE
HCO3 BLDV-SCNC: 14.6 MEQ/L (ref 22–26)
HCT VFR BLD AUTO: 21.9 %
HDL CHOLESTEROL: 45 MG/DL
HEMATOCRIT: 24.4 % (ref 38.5–50)
HEMOGLOBIN: 7.8 G/DL (ref 13.2–17.1)
HGB BLD-MCNC: 7.6 G/DL
IMM GRANULOCYTES # BLD AUTO: 0.01 X10(3) UL (ref 0–1)
IMM GRANULOCYTES NFR BLD: 0.3 %
IRON SATN MFR SERPL: 41 %
IRON SERPL-MCNC: 101 UG/DL
KETONES UR-MCNC: NEGATIVE MG/DL
KETONES: NEGATIVE
LDL-CHOLESTEROL: 156 MG/DL (CALC)
LEUKOCYTE ESTERASE UR QL STRIP.AUTO: NEGATIVE
LEUKOCYTE ESTERASE: NEGATIVE
LYMPHOCYTES # BLD AUTO: 1.01 X10(3) UL (ref 1–4)
LYMPHOCYTES NFR BLD AUTO: 29 %
LYMPHOCYTES: 25.3 %
MCH RBC QN AUTO: 29 PG (ref 26–34)
MCH: 28.1 PG (ref 27–33)
MCHC RBC AUTO-ENTMCNC: 34.7 G/DL (ref 31–37)
MCHC: 32 G/DL (ref 32–36)
MCV RBC AUTO: 83.6 FL
MCV: 87.8 FL (ref 80–100)
MICROALBUMIN/CREATININE RATIO, RANDOM URINE: 3439 MCG/MG CREAT
MICROALBUMIN: 361.1 MG/DL
MONOCYTES # BLD AUTO: 0.33 X10(3) UL (ref 0.1–1)
MONOCYTES NFR BLD AUTO: 9.5 %
MONOCYTES: 7.1 %
MPV: 9.4 FL (ref 7.5–12.5)
NEUTROPHILS # BLD AUTO: 1.91 X10 (3) UL (ref 1.5–7.7)
NEUTROPHILS # BLD AUTO: 1.91 X10(3) UL (ref 1.5–7.7)
NEUTROPHILS NFR BLD AUTO: 54.9 %
NEUTROPHILS: 57.3 %
NITRITE UR QL STRIP.AUTO: NEGATIVE
NITRITE: NEGATIVE
NON-HDL CHOLESTEROL: 188 MG/DL (CALC)
O2/TOTAL GAS SETTING VFR VENT: 21 %
OSMOLALITY SERPL CALC.SUM OF ELEC: 321 MOSM/KG (ref 275–295)
P AXIS: 39 DEGREES
P-R INTERVAL: 148 MS
PCO2 BLDV: 34 MM HG (ref 38–50)
PH BLDV: 7.22 [PH] (ref 7.32–7.43)
PH UR: 6 [PH] (ref 5–8)
PH: 5.5 (ref 5–8)
PHOSPHATE (AS PHOSPHORUS): 9.8 MG/DL (ref 2.5–4.5)
PLATELET # BLD AUTO: 293 10(3)UL (ref 150–450)
PLATELET COUNT: 291 THOUSAND/UL (ref 140–400)
PO2 BLDV: 58 MM HG (ref 35–40)
POTASSIUM SERPL-SCNC: 4.8 MMOL/L (ref 3.5–5.1)
POTASSIUM: 4.4 MMOL/L (ref 3.5–5.3)
PROT SERPL-MCNC: 7 G/DL (ref 6.4–8.2)
PROT UR-MCNC: 300 MG/DL
PROTEIN, TOTAL: 6.2 G/DL (ref 6.1–8.1)
PSA, TOTAL: 0.64 NG/ML
PUNCTURE CHARGE: NO
Q-T INTERVAL: 410 MS
QRS DURATION: 98 MS
QTC CALCULATION (BEZET): 445 MS
R AXIS: -7 DEGREES
RBC # BLD AUTO: 2.62 X10(6)UL
RDW: 15.3 % (ref 11–15)
RED BLOOD CELL COUNT: 2.78 MILLION/UL (ref 4.2–5.8)
RH BLOOD TYPE: POSITIVE
SAO2 % BLDV: 90.9 % (ref 60–85)
SODIUM SERPL-SCNC: 131 MMOL/L (ref 136–145)
SODIUM: 134 MMOL/L (ref 135–146)
SP GR UR STRIP: 1.01 (ref 1–1.03)
SPECIFIC GRAVITY: 1.01 (ref 1–1.03)
T AXIS: 30 DEGREES
TIBC SERPL-MCNC: 244 UG/DL (ref 240–450)
TRANSFERRIN SERPL-MCNC: 164 MG/DL (ref 200–360)
TRIGLYCERIDES: 187 MG/DL
TSI SER-ACNC: 1.87 MIU/ML (ref 0.36–3.74)
UROBILINOGEN UR STRIP-ACNC: NORMAL
VENTRICULAR RATE: 71 BPM
VIT B12 SERPL-MCNC: 585 PG/ML (ref 193–986)
WBC # BLD AUTO: 3.5 X10(3) UL (ref 4–11)
WHITE BLOOD CELL COUNT: 3.5 THOUSAND/UL (ref 3.8–10.8)

## 2023-08-01 PROCEDURE — 99255 IP/OBS CONSLTJ NEW/EST HI 80: CPT | Performed by: INTERNAL MEDICINE

## 2023-08-01 PROCEDURE — 99222 1ST HOSP IP/OBS MODERATE 55: CPT | Performed by: HOSPITALIST

## 2023-08-01 PROCEDURE — 93971 EXTREMITY STUDY: CPT | Performed by: EMERGENCY MEDICINE

## 2023-08-01 PROCEDURE — 71045 X-RAY EXAM CHEST 1 VIEW: CPT | Performed by: EMERGENCY MEDICINE

## 2023-08-01 RX ORDER — ASPIRIN 81 MG/1
81 TABLET ORAL DAILY
Status: DISCONTINUED | OUTPATIENT
Start: 2023-08-02 | End: 2023-08-04

## 2023-08-01 RX ORDER — HEPARIN SODIUM 5000 [USP'U]/ML
5000 INJECTION, SOLUTION INTRAVENOUS; SUBCUTANEOUS EVERY 12 HOURS SCHEDULED
Status: DISCONTINUED | OUTPATIENT
Start: 2023-08-01 | End: 2023-08-04

## 2023-08-01 RX ORDER — ONDANSETRON 2 MG/ML
4 INJECTION INTRAMUSCULAR; INTRAVENOUS EVERY 6 HOURS PRN
Status: DISCONTINUED | OUTPATIENT
Start: 2023-08-01 | End: 2023-08-04

## 2023-08-01 RX ORDER — VALSARTAN AND HYDROCHLOROTHIAZIDE 160; 25 MG/1; MG/1
1 TABLET ORAL DAILY
Status: DISCONTINUED | OUTPATIENT
Start: 2023-08-01 | End: 2023-08-01 | Stop reason: RX

## 2023-08-01 RX ORDER — TEMAZEPAM 15 MG/1
15 CAPSULE ORAL NIGHTLY PRN
Status: DISCONTINUED | OUTPATIENT
Start: 2023-08-01 | End: 2023-08-04

## 2023-08-01 RX ORDER — ATORVASTATIN CALCIUM 80 MG/1
80 TABLET, FILM COATED ORAL NIGHTLY
Status: DISCONTINUED | OUTPATIENT
Start: 2023-08-01 | End: 2023-08-04

## 2023-08-01 RX ORDER — DEXTROSE MONOHYDRATE 25 G/50ML
50 INJECTION, SOLUTION INTRAVENOUS
Status: DISCONTINUED | OUTPATIENT
Start: 2023-08-01 | End: 2023-08-04

## 2023-08-01 RX ORDER — PROCHLORPERAZINE EDISYLATE 5 MG/ML
5 INJECTION INTRAMUSCULAR; INTRAVENOUS EVERY 8 HOURS PRN
Status: DISCONTINUED | OUTPATIENT
Start: 2023-08-01 | End: 2023-08-04

## 2023-08-01 RX ORDER — AMLODIPINE BESYLATE 10 MG/1
10 TABLET ORAL DAILY
Status: DISCONTINUED | OUTPATIENT
Start: 2023-08-01 | End: 2023-08-04

## 2023-08-01 RX ORDER — NICOTINE POLACRILEX 4 MG
15 LOZENGE BUCCAL
Status: DISCONTINUED | OUTPATIENT
Start: 2023-08-01 | End: 2023-08-04

## 2023-08-01 RX ORDER — ACETAMINOPHEN 500 MG
500 TABLET ORAL EVERY 4 HOURS PRN
Status: DISCONTINUED | OUTPATIENT
Start: 2023-08-01 | End: 2023-08-04

## 2023-08-01 RX ORDER — NICOTINE POLACRILEX 4 MG
30 LOZENGE BUCCAL
Status: DISCONTINUED | OUTPATIENT
Start: 2023-08-01 | End: 2023-08-04

## 2023-08-01 NOTE — TELEPHONE ENCOUNTER
Dr Alexandria Welch: please review. [facility] called to report critical values. CMP 7/31/23   They will fax copy of lab report to office also. I tried to reach patient to see how he is feeling; No answer. Left message to call back.       Creatinine 15.74    EGFR 3  Sodium 134  Carbon Dioxide 16  6.3 Calcium  Albumin 3.3  Phosphate 9.8

## 2023-08-01 NOTE — H&P
[facility]    PATIENT'S NAME: NIKKY CHU   ATTENDING PHYSICIAN: Dorota Pulido MD   PATIENT ACCOUNT#:   [de-identified]    LOCATION:  [address]  MEDICAL RECORD #:   [id number]       YOB: 1965  ADMISSION DATE:       08/01/2023    HISTORY AND PHYSICAL EXAMINATION    CHIEF COMPLAINT:  End-stage renal disease. HISTORY OF PRESENT ILLNESS:  Patient is a 33-year-old  male who presented to the emergency department after he was found to have profound drop in his kidney function since last year. He was seen as a followup appointment by his primary care physician who ordered blood test including basic metabolic panel which showed BUN and creatinine of 152 and 15.74 and bicarbonate of 16. His BUN and creatinine were 42 and 4.20 around 1 year ago. Patient had CBC which showed hemoglobin of 7.8. Today, CMP, CBC, TSH, iron studies are still pending. Venous Doppler study both lower extremities were negative. Chest x-ray showed no acute findings. CT scan of the abdomen and pelvis was ordered. The patient will be admitted to the hospital for further management. Bladder scan showed only 125 mL of urine in his bladder. PAST MEDICAL HISTORY:  Hypertension, hyperlipidemia, diabetes mellitus type 2, chronic kidney disease stage 3 to 4 on basis of diabetic nephropathy. PAST SURGICAL HISTORY:  Cataract procedure and vasectomy. MEDICATIONS:  Please see medication reconciliation list.      ALLERGIES:  He had side effects to losartan, but no known drug allergies. FAMILY HISTORY:  Positive for diabetes mellitus type 2 and hypertension. SOCIAL HISTORY:  No tobacco or drug use. Social alcohol. Lives by himself. Works as a . Usually independent in his basic activities of daily living. REVIEW OF SYSTEMS:  Patient said progressively for last several weeks been feeling extremely fatigue, tired with decreased energy.   Also has been noticing decreased urine output. He denies any fever, chills, or abdominal pain. No dysuria. Other 12-point review of systems is negative. PHYSICAL EXAMINATION:    GENERAL:  Alert and oriented to time, place, and person. No acute distress. VITAL SIGNS:  Temperature 98.0, pulse 73, respiratory rate 20, blood pressure 163/84, pulse ox 100% on room air. HEENT:  Atraumatic. Oropharynx clear. Dry mucous membranes. Normal hard and soft palate. Eyes:  Anicteric sclerae. NECK:  Supple. No lymphadenopathy. Trachea midline. Full range of motion. LUNGS:  Clear to auscultation bilaterally. Normal respiratory effort. HEART:  Regular rate, rhythm. S1 and S2 auscultated. No murmur. ABDOMEN:  Soft, nondistended. No tenderness. Positive bowel sounds. EXTREMITIES:  No peripheral edema, clubbing, or cyanosis. NEUROLOGIC:  Motor and sensory intact. ASSESSMENT AND PLAN:    1. Progressive renal failure, nearing end-stage renal disease with uremia. 2.   Hypertension. 3.   Diabetes mellitus type 2. Patient will be admitted to general medical floor, remote telemetry. Iron studies, TSH, CT scan of the abdomen, urinalysis with reflex culture, vitamin B12. Nephrology consult and possible initiation of dialysis. Further recommendations to follow.      Dictated By Simin White MD  d: 08/01/2023 17:20:41  t: 08/01/2023 17:47:04  Job [id number]  /

## 2023-08-01 NOTE — TELEPHONE ENCOUNTER
Attempted to call again. No answer. Went to  again. Left message that patient will need to go to ER due to critical abnormal values in labs. I see patient sent a message to DR Lovell's office 7/30/23. I will send a [employer] message. It appears he sees messages.

## 2023-08-01 NOTE — TELEPHONE ENCOUNTER
I do not see an ED encounter --> he is on the way now, he states he will be going to [facility] ED. He was made aware to let them know reason for ED visit is related to critical labs and advised to go to ED per PCP. Patient verbalized understanding. No further questions or concerns at this time. I called [facility] ED and spoke with Hiro Guerra, [address]. I made him aware of critical labs and advise to go to ED. I made him aware per patient he is on his way to ED now. He will pre-arrive him. He verbalized understanding.     ANA ROSA Alfaro

## 2023-08-01 NOTE — TELEPHONE ENCOUNTER
Patient calling back, confirmed name and . Advised him to go to ER per Dr. Davian Preciado advice due to critically abnormal labs. Patient verbalized understanding and agrees. 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 (He is a truck-.)    Advised patient to schedule a follow-up visit with Dr. Mercedes Alfaro after ER evaluation. Patient verbalized understanding and agrees.

## 2023-08-01 NOTE — ED QUICK NOTES
Orders for admission, patient is aware of plan and ready to go upstairs. Any questions, please call ED RN lynette at extension [phone].      Patient Covid vaccination status: Fully vaccinated     COVID Test Ordered in ED: None    COVID Suspicion at Admission: N/A    Running Infusions:    sodium bicarbonate in D5W infusion 100 mEq (08/01/23 1729)        Mental Status/LOC at time of transport: axox4    Other pertinent information: N/a  CIWA score: N/A   NIH score:  N/A

## 2023-08-01 NOTE — ED INITIAL ASSESSMENT (HPI)
Pt sent to ED by PCP for abnormal labs drawn yesterday   Creatinine 15.74. Hgb 7.8  Phos 9.8  Pt states he has \"not been feeling well\" for a while. Pt states extreme fatigue and dyspnea with exertion. Denies chest pain. Pt states decreased urine output. Pt states hx of abnormal kidney function. Pt is alert and oriented x4. Pt ambulating by self with steady gait. Pt skin parameters WNL.

## 2023-08-01 NOTE — CONSULTS
[facility]    PATIENT'S NAME: NIKKY CHU   ATTENDING PHYSICIAN: Ramya Marie MD   CONSULTING PHYSICIAN: Lan Limon MD   PATIENT ACCOUNT#:   [de-identified]    LOCATION:  [address]  MEDICAL RECORD #:   [id number]       YOB: 1965  ADMISSION DATE:       08/01/2023      CONSULT DATE:  08/01/2023    REPORT OF CONSULTATION      HISTORY OF PRESENT ILLNESS:  The patient is a 51-year-old  male with a history of adult-onset diabetes mellitus, hypertension, and chronic kidney disease associated with nephrotic range proteinuria, thought to be secondary to diabetic nephropathy, who now presents to the emergency room with severely abnormal laboratory studies. The patient has been poorly compliant with followup here and I had seen the patient last in September 2022. His last set of labs were done in July 2022 which revealed a creatinine of 4.20 with an estimated GFR of 16 mL/min and hemoglobin of 10.2. The patient was advised that he is nearing end-stage renal disease and we had discussed the indications for dialysis. The patient had been advised to repeat his laboratory studies in October 2022 but never followed through. He saw his primary care doctor for a routine checkup on June 16, 2023. At that time, he had no overt uremic symptoms. A routine followup laboratory studies were ordered, but again the patient did not do them right away. He then called us on July 30, 2023 stating that he was not feeling well. He complained of some nausea, fatigue, loss of appetite, and muscle cramps. The patient is advised to do his labs ASAP and then to see me for followup. He finally did laboratory studies earlier today. These were Dr. Gloria vazquez. The patient had a BUN and creatinine of 152 and 15.74 respectively with a hemoglobin 7.8, and the patient was directed to the emergency room. The patient states he was feeling okay up until the last couple of weeks.   He has noted some mild diarrhea but has definitely noted a metallic taste in his mouth, anorexia, nausea but no actual vomiting. Reports generalized fatigue. PAST MEDICAL HISTORY:  As stated above. He has a history of hypertension diagnosed in 2000 and history of adult-onset diabetes mellitus diagnosed in 2004. MEDICATIONS:  At time of admission included atorvastatin, valsartan HCT, amlodipine, glipizide, Toprol-XL, aspirin 81 mg daily. ALLERGIES:   The patient is allergic to losartan. SOCIAL HISTORY:  He is a nonsmoker and works as a . Normally independent in his ADLs. FAMILY HISTORY:  Negative for renal pathology. REVIEW OF SYSTEMS:  Otherwise, as stated above. No chest pain, shortness of breath, recent URI symptoms. No fevers or chills. A 10-point review of systems is otherwise unremarkable, but he states he has probably lost 15 pounds secondary to anorexia. PHYSICAL EXAMINATION:    GENERAL:  The patient is awake, alert, and comfortable at rest.  VITAL SIGNS:  Blood pressure of 163/84 with a pulse of 73, respirations 20, temperature 98 degrees, and O2 saturation was 100% on room air. He weighed 192 pounds. NECK:  Supple without JVD. LUNGS:  Clear. HEART:  Regular rate and rhythm with an S4, but no other gallops, murmurs, or rubs. ABDOMEN:  Soft, flat, nontender without organomegaly, masses, or bruits. EXTREMITIES:  Trace edema on the left, negative on the right. LABORATORY DATA:  Repeat laboratory studies here in the ER are pending. He did however have a chest x-ray which was unremarkable. He just had a venous Doppler of his left lower extremity, which revealed no evidence for deep venous thrombosis. EKG was unremarkable. IMPRESSION:    1. The patient is a 73-year-old male now with marked progression of his renal failure.   The patient is having uremic symptoms and unless we can find something reversible unfortunately he is now at end-stage renal disease. History of poor compliance and last had labs done  over a year ago. 2.   Adult-onset diabetes mellitus diagnosed in 2004 associated with chronic progressive renal insufficiency and nephrotic range proteinuria. 3.   History of longstanding hypertension. 4.   Anemia, most likely of chronic disease. Rule out iron deficiency. PLAN:  We will await repeat laboratory studies. The patient is scheduled for CT scan of the abdomen without IV contrast.  Will follow with I's and O's, daily weights, and serial chemistries. Check iron studies. Start gentle IV hydration with a bicarbonate drip. I informed the patient that  unless we find some reversible component that he unfortunately is that end-stage renal disease with uremic symptoms and will require chronic dialytic therapy. He states he understands and would be agreeable to proceed.     Dictated By Romeo Shaikh MD  d: 08/01/2023 16:56:02  t: 08/01/2023 17:11:11  [facility] [id number]  [facility]/

## 2023-08-02 ENCOUNTER — APPOINTMENT (OUTPATIENT)
Dept: INTERVENTIONAL RADIOLOGY/VASCULAR | Facility: HOSPITAL | Age: 58
End: 2023-08-02
Attending: INTERNAL MEDICINE
Payer: COMMERCIAL

## 2023-08-02 ENCOUNTER — APPOINTMENT (OUTPATIENT)
Dept: ULTRASOUND IMAGING | Facility: HOSPITAL | Age: 58
End: 2023-08-02
Attending: INTERNAL MEDICINE
Payer: COMMERCIAL

## 2023-08-02 PROBLEM — N18.6 ESRD ON HEMODIALYSIS (HCC): Status: ACTIVE | Noted: 2023-08-02

## 2023-08-02 PROBLEM — Z99.2 ESRD ON HEMODIALYSIS (HCC): Status: ACTIVE | Noted: 2023-08-02

## 2023-08-02 LAB
ADENOVIRUS F 40/41 PCR: NEGATIVE
ALBUMIN SERPL-MCNC: 2.6 G/DL (ref 3.4–5)
ANION GAP SERPL CALC-SCNC: 17 MMOL/L (ref 0–18)
ASTROVIRUS PCR: NEGATIVE
BASOPHILS # BLD AUTO: 0.02 X10(3) UL (ref 0–0.2)
BASOPHILS # BLD AUTO: 0.03 X10(3) UL (ref 0–0.2)
BASOPHILS NFR BLD AUTO: 0.6 %
BASOPHILS NFR BLD AUTO: 1 %
BUN BLD-MCNC: 158 MG/DL (ref 7–18)
BUN/CREAT SERPL: 9.6 (ref 10–20)
C CAYETANENSIS DNA SPEC QL NAA+PROBE: NEGATIVE
C DIFF TOX B STL QL: NEGATIVE
CALCIUM BLD-MCNC: 6.4 MG/DL (ref 8.5–10.1)
CAMPY SP DNA.DIARRHEA STL QL NAA+PROBE: NEGATIVE
CHLORIDE SERPL-SCNC: 101 MMOL/L (ref 98–112)
CO2 SERPL-SCNC: 16 MMOL/L (ref 21–32)
CREAT BLD-MCNC: 16.4 MG/DL
CREAT UR-SCNC: 50.8 MG/DL
CRYPTOSP DNA SPEC QL NAA+PROBE: NEGATIVE
DEPRECATED RDW RBC AUTO: 40.1 FL (ref 35.1–46.3)
DEPRECATED RDW RBC AUTO: 40.4 FL (ref 35.1–46.3)
EAEC PAA PLAS AGGR+AATA ST NAA+NON-PRB: NEGATIVE
EC STX1+STX2 + H7 FLIC SPEC NAA+PROBE: NEGATIVE
EGFRCR SERPLBLD CKD-EPI 2021: 3 ML/MIN/1.73M2 (ref 60–?)
ENTAMOEBA HISTOLYTICA PCR: NEGATIVE
EOSINOPHIL # BLD AUTO: 0.26 X10(3) UL (ref 0–0.7)
EOSINOPHIL # BLD AUTO: 0.28 X10(3) UL (ref 0–0.7)
EOSINOPHIL NFR BLD AUTO: 8.3 %
EOSINOPHIL NFR BLD AUTO: 8.6 %
EPEC EAE GENE STL QL NAA+NON-PROBE: NEGATIVE
ERYTHROCYTE [DISTWIDTH] IN BLOOD BY AUTOMATED COUNT: 13.3 % (ref 11–15)
ERYTHROCYTE [DISTWIDTH] IN BLOOD BY AUTOMATED COUNT: 13.5 % (ref 11–15)
ETEC LTA+ST1A+ST1B TOX ST NAA+NON-PROBE: NEGATIVE
GIARDIA LAMBLIA PCR: NEGATIVE
GLUCOSE BLD-MCNC: 108 MG/DL (ref 70–99)
GLUCOSE BLDC GLUCOMTR-MCNC: 111 MG/DL (ref 70–99)
GLUCOSE BLDC GLUCOMTR-MCNC: 122 MG/DL (ref 70–99)
GLUCOSE BLDC GLUCOMTR-MCNC: 136 MG/DL (ref 70–99)
GLUCOSE BLDC GLUCOMTR-MCNC: 208 MG/DL (ref 70–99)
HBV CORE AB SERPL QL IA: NONREACTIVE
HBV SURFACE AB SER QL: NONREACTIVE
HBV SURFACE AB SERPL IA-ACNC: <3.1 MIU/ML
HBV SURFACE AG SER-ACNC: <0.1 [IU]/L
HBV SURFACE AG SERPL QL IA: NONREACTIVE
HCT VFR BLD AUTO: 19.5 %
HCT VFR BLD AUTO: 23.5 %
HGB BLD-MCNC: 6.8 G/DL
HGB BLD-MCNC: 8.1 G/DL
IMM GRANULOCYTES # BLD AUTO: 0 X10(3) UL (ref 0–1)
IMM GRANULOCYTES # BLD AUTO: 0.01 X10(3) UL (ref 0–1)
IMM GRANULOCYTES NFR BLD: 0 %
IMM GRANULOCYTES NFR BLD: 0.3 %
IRON SATN MFR SERPL: 36 %
IRON SERPL-MCNC: 84 UG/DL
LYMPHOCYTES # BLD AUTO: 0.66 X10(3) UL (ref 1–4)
LYMPHOCYTES # BLD AUTO: 0.72 X10(3) UL (ref 1–4)
LYMPHOCYTES NFR BLD AUTO: 21.2 %
LYMPHOCYTES NFR BLD AUTO: 21.8 %
MAGNESIUM SERPL-MCNC: 2.3 MG/DL (ref 1.6–2.6)
MCH RBC QN AUTO: 28 PG (ref 26–34)
MCH RBC QN AUTO: 28.9 PG (ref 26–34)
MCHC RBC AUTO-ENTMCNC: 34.5 G/DL (ref 31–37)
MCHC RBC AUTO-ENTMCNC: 34.9 G/DL (ref 31–37)
MCV RBC AUTO: 81.3 FL
MCV RBC AUTO: 83 FL
MICROALBUMIN UR-MCNC: 229 MG/DL
MICROALBUMIN/CREAT 24H UR-RTO: 4507.9 UG/MG (ref ?–30)
MONOCYTES # BLD AUTO: 0.28 X10(3) UL (ref 0.1–1)
MONOCYTES # BLD AUTO: 0.35 X10(3) UL (ref 0.1–1)
MONOCYTES NFR BLD AUTO: 10.3 %
MONOCYTES NFR BLD AUTO: 9.2 %
NEUTROPHILS # BLD AUTO: 1.8 X10 (3) UL (ref 1.5–7.7)
NEUTROPHILS # BLD AUTO: 1.8 X10(3) UL (ref 1.5–7.7)
NEUTROPHILS # BLD AUTO: 2.01 X10 (3) UL (ref 1.5–7.7)
NEUTROPHILS # BLD AUTO: 2.01 X10(3) UL (ref 1.5–7.7)
NEUTROPHILS NFR BLD AUTO: 59.3 %
NEUTROPHILS NFR BLD AUTO: 59.4 %
NOROVIRUS GI/GII PCR: NEGATIVE
OSMOLALITY SERPL CALC.SUM OF ELEC: 330 MOSM/KG (ref 275–295)
P SHIGELLOIDES DNA STL QL NAA+PROBE: NEGATIVE
PHOSPHATE SERPL-MCNC: 9.1 MG/DL (ref 2.5–4.9)
PLATELET # BLD AUTO: 268 10(3)UL (ref 150–450)
PLATELET # BLD AUTO: 283 10(3)UL (ref 150–450)
POTASSIUM SERPL-SCNC: 4.4 MMOL/L (ref 3.5–5.1)
PTH-INTACT SERPL-MCNC: 595.1 PG/ML (ref 18.5–88)
RBC # BLD AUTO: 2.35 X10(6)UL
RBC # BLD AUTO: 2.89 X10(6)UL
RH BLOOD TYPE: POSITIVE
ROTAVIRUS A PCR: NEGATIVE
SALMONELLA DNA SPEC QL NAA+PROBE: NEGATIVE
SAPOVIRUS PCR: NEGATIVE
SHIGELLA SP+EIEC IPAH ST NAA+NON-PROBE: NEGATIVE
SODIUM SERPL-SCNC: 134 MMOL/L (ref 136–145)
TIBC SERPL-MCNC: 235 UG/DL (ref 240–450)
TRANSFERRIN SERPL-MCNC: 158 MG/DL (ref 200–360)
V CHOLERAE DNA SPEC QL NAA+PROBE: NEGATIVE
VIBRIO DNA SPEC NAA+PROBE: NEGATIVE
WBC # BLD AUTO: 3 X10(3) UL (ref 4–11)
WBC # BLD AUTO: 3.4 X10(3) UL (ref 4–11)
YERSINIA DNA SPEC NAA+PROBE: NEGATIVE

## 2023-08-02 PROCEDURE — 5A1D70Z PERFORMANCE OF URINARY FILTRATION, INTERMITTENT, LESS THAN 6 HOURS PER DAY: ICD-10-PCS | Performed by: INTERNAL MEDICINE

## 2023-08-02 PROCEDURE — 30233N1 TRANSFUSION OF NONAUTOLOGOUS RED BLOOD CELLS INTO PERIPHERAL VEIN, PERCUTANEOUS APPROACH: ICD-10-PCS | Performed by: INTERNAL MEDICINE

## 2023-08-02 PROCEDURE — B5191ZA FLUOROSCOPY OF INFERIOR VENA CAVA USING LOW OSMOLAR CONTRAST, GUIDANCE: ICD-10-PCS | Performed by: RADIOLOGY

## 2023-08-02 PROCEDURE — 76770 US EXAM ABDO BACK WALL COMP: CPT | Performed by: INTERNAL MEDICINE

## 2023-08-02 PROCEDURE — 3060F POS MICROALBUMINURIA REV: CPT | Performed by: INTERNAL MEDICINE

## 2023-08-02 PROCEDURE — 99233 SBSQ HOSP IP/OBS HIGH 50: CPT | Performed by: INTERNAL MEDICINE

## 2023-08-02 PROCEDURE — 99233 SBSQ HOSP IP/OBS HIGH 50: CPT | Performed by: HOSPITALIST

## 2023-08-02 PROCEDURE — 06H033Z INSERTION OF INFUSION DEVICE INTO INFERIOR VENA CAVA, PERCUTANEOUS APPROACH: ICD-10-PCS | Performed by: RADIOLOGY

## 2023-08-02 PROCEDURE — 0JH63XZ INSERTION OF TUNNELED VASCULAR ACCESS DEVICE INTO CHEST SUBCUTANEOUS TISSUE AND FASCIA, PERCUTANEOUS APPROACH: ICD-10-PCS | Performed by: RADIOLOGY

## 2023-08-02 RX ORDER — HEPARIN SODIUM 1000 [USP'U]/ML
1.5 INJECTION, SOLUTION INTRAVENOUS; SUBCUTANEOUS
Status: DISCONTINUED | OUTPATIENT
Start: 2023-08-02 | End: 2023-08-04

## 2023-08-02 RX ORDER — LIDOCAINE HYDROCHLORIDE 20 MG/ML
INJECTION, SOLUTION EPIDURAL; INFILTRATION; INTRACAUDAL; PERINEURAL
Status: COMPLETED
Start: 2023-08-02 | End: 2023-08-02

## 2023-08-02 RX ORDER — SEVELAMER CARBONATE 800 MG/1
800 TABLET, FILM COATED ORAL
Status: DISCONTINUED | OUTPATIENT
Start: 2023-08-02 | End: 2023-08-04

## 2023-08-02 RX ORDER — SODIUM CHLORIDE 9 MG/ML
INJECTION, SOLUTION INTRAVENOUS ONCE
Status: COMPLETED | OUTPATIENT
Start: 2023-08-02 | End: 2023-08-02

## 2023-08-02 RX ORDER — ALBUMIN (HUMAN) 12.5 G/50ML
100 SOLUTION INTRAVENOUS
Status: DISCONTINUED | OUTPATIENT
Start: 2023-08-02 | End: 2023-08-04

## 2023-08-02 RX ORDER — MIDAZOLAM HYDROCHLORIDE 1 MG/ML
INJECTION INTRAMUSCULAR; INTRAVENOUS
Status: COMPLETED
Start: 2023-08-02 | End: 2023-08-02

## 2023-08-02 RX ORDER — HEPARIN SODIUM 1000 [USP'U]/ML
INJECTION, SOLUTION INTRAVENOUS; SUBCUTANEOUS
Status: COMPLETED
Start: 2023-08-02 | End: 2023-08-02

## 2023-08-02 RX ORDER — PANTOPRAZOLE SODIUM 40 MG/1
40 TABLET, DELAYED RELEASE ORAL
Status: DISCONTINUED | OUTPATIENT
Start: 2023-08-02 | End: 2023-08-04

## 2023-08-02 RX ORDER — CEFAZOLIN SODIUM/WATER 2 G/20 ML
SYRINGE (ML) INTRAVENOUS
Status: COMPLETED
Start: 2023-08-02 | End: 2023-08-02

## 2023-08-02 NOTE — PLAN OF CARE
A&OX4, self care. RA. Home with son. 1U PRBC infused. Tunneled cath placed today. Plan to start dialysis today. Problem: Patient Centered Care  Goal: Patient preferences are identified and integrated in the patient's plan of care  Description: Interventions:  - What would you like us to know as we care for you?  From home with son   - Provide timely, complete, and accurate information to patient/family  - Incorporate patient and family knowledge, values, beliefs, and cultural backgrounds into the planning and delivery of care  - Encourage patient/family to participate in care and decision-making at the level they choose  - Honor patient and family perspectives and choices  Outcome: Progressing     Problem: Diabetes/Glucose Control  Goal: Glucose maintained within prescribed range  Description: INTERVENTIONS:  - Monitor Blood Glucose as ordered  - Assess for signs and symptoms of hyperglycemia and hypoglycemia  - Administer ordered medications to maintain glucose within target range  - Assess barriers to adequate nutritional intake and initiate nutrition consult as needed  - Instruct patient on self management of diabetes  Outcome: Progressing     Problem: Patient/Family Goals  Goal: Patient/Family Long Term Goal  Description: Patient's Long Term Goal:     Interventions:  -   - See additional Care Plan goals for specific interventions  Outcome: Progressing  Goal: Patient/Family Short Term Goal  Description: Patient's Short Term Goal:     Interventions:   -   - See additional Care Plan goals for specific interventions  Outcome: Progressing

## 2023-08-02 NOTE — PROGRESS NOTES
Valsartan-hydroCHLOROthiazide 160-25 MG TABS 1 tablet  daily  is Non-Formulary Medication &  Auto-Substituted to  osartan (Cozaar) 100 mg, hydroCHLOROthiazide (Hydrodiuril) 25 mg for Hyzaar 100/25 ([facility] only)  Per P&T PROTOCOL

## 2023-08-02 NOTE — PROGRESS NOTES
Discussed tunneled dialysis catheter placement procedure with Milind Charles who states understanding and agrees to proceed.

## 2023-08-02 NOTE — CM/SW NOTE
"Requested Prescriptions   Pending Prescriptions Disp Refills     pravastatin (PRAVACHOL) 40 MG tablet [Pharmacy Med Name: PRAVASTATIN 40MG TABLETS]  Last Written Prescription Date:  10/24/2017  Last Fill Quantity: 90 tablet,  # refills: 1   Last office visit: 1/23/2018 with prescribing provider:  Laura Tipton MD   Future Office Visit:   Next 5 appointments (look out 90 days)     May 08, 2018  1:30 PM CDT   Office Visit with Laura Tipton MD   [address] [address] [phone]                  90 tablet 0     Sig: TAKE 1 TABLET(40 MG) BY MOUTH DAILY    Statins Protocol Passed    4/21/2018  3:32 AM       Passed - LDL on file in past 12 months    Recent Labs   Lab Test  09/28/17   0938   LDL  76            Passed - No abnormal creatine kinase in past 12 months    No lab results found.            Passed - Recent (12 mo) or future (30 days) visit within the authorizing provider's specialty    Patient had office visit in the last 12 months or has a visit in the next 30 days with authorizing provider or within the authorizing provider's specialty.  See \"Patient Info\" tab in inbasket, or \"Choose Columns\" in Meds & Orders section of the refill encounter.           Passed - Patient is age 18 or older       Passed - No active pregnancy on record       Passed - No positive pregnancy test in past 12 months          " 10: 30AM  Per RN rounds, pt likely to need new Outpatient HD arranged upon DC. JOSIAH consulted further w/ MAY Anaya - nephrology still to see pt as f/up today. RN confirmed SW to wait to see pt until nephrology has been by for an update. Please consult/notify SW when need for outpatient HD arrangements is determined. 02:15PM  Received MDO for Outpatient Dialysis. Met w/ pt at bedside to discuss. Verified pt's home address. JOSIAH informed pt that he has 2 options:    A: Go to [address] [place] [address] or [facility] and keep Dr. Yordan Polanco as hi nephrologist    B: Go to a dialysis clinic closer to home and have a new nephrologist.    Pt has decided to stay w/ Dr. Yordan Polanco and go to [facility] for outpatient HD. Pt stating he does not have any preference in HD scheduling and is agreeable to what the clinic might have available upon DC. JOSIAH spoke to [facility]/ [facility] and provided [employer]" of incoming referral for pt. Clinicals sent to [facility] via [employer] for review. JOSIAH sent message to MAY/M[facility] requesting Hep B Profile. 03:45PM  Received call from [facility] w/ [facility] and answered all medical questions. [facility] can be reached via tele phone #: [phone]; ext [phone]. Need for DC  1. Hep B Profile  2. Perma cath report  3. HD flowsheets  4. Confirm HD chair time    PLAN: Home w/ Outpatient HD at [facility]  - pending above & med clear    SW/CM to remain available for support and/or discharge planning.          Ruddy Vásquez, MSW, [address] [facility]

## 2023-08-02 NOTE — PLAN OF CARE
Pt alert & oriented x4. Currently on room air. No c/o pain throughout the night. Safety precautions in place.    Problem: Patient Centered Care  Goal: Patient preferences are identified and integrated in the patient's plan of care  Description: Interventions:  - What would you like us to know as we care for you?   - Provide timely, complete, and accurate information to patient/family  - Incorporate patient and family knowledge, values, beliefs, and cultural backgrounds into the planning and delivery of care  - Encourage patient/family to participate in care and decision-making at the level they choose  - Honor patient and family perspectives and choices  Outcome: Progressing     Problem: Diabetes/Glucose Control  Goal: Glucose maintained within prescribed range  Description: INTERVENTIONS:  - Monitor Blood Glucose as ordered  - Assess for signs and symptoms of hyperglycemia and hypoglycemia  - Administer ordered medications to maintain glucose within target range  - Assess barriers to adequate nutritional intake and initiate nutrition consult as needed  - Instruct patient on self management of diabetes  Outcome: Progressing     Problem: Patient/Family Goals  Goal: Patient/Family Long Term Goal  Description: Patient's Long Term Goal:     Interventions:  -   - See additional Care Plan goals for specific interventions  Outcome: Progressing  Goal: Patient/Family Short Term Goal  Description: Patient's Short Term Goal:     Interventions:   -   - See additional Care Plan goals for specific interventions  Outcome: Progressing

## 2023-08-03 LAB
ALBUMIN SERPL-MCNC: 2.4 G/DL (ref 3.4–5)
ANION GAP SERPL CALC-SCNC: 12 MMOL/L (ref 0–18)
BASOPHILS # BLD AUTO: 0.01 X10(3) UL (ref 0–0.2)
BASOPHILS NFR BLD AUTO: 0.3 %
BLOOD TYPE BARCODE: 5100
BUN BLD-MCNC: 104 MG/DL (ref 7–18)
BUN/CREAT SERPL: 8.4 (ref 10–20)
CALCIUM BLD-MCNC: 6.5 MG/DL (ref 8.5–10.1)
CHLORIDE SERPL-SCNC: 102 MMOL/L (ref 98–112)
CO2 SERPL-SCNC: 24 MMOL/L (ref 21–32)
CREAT BLD-MCNC: 12.4 MG/DL
DEPRECATED RDW RBC AUTO: 38.8 FL (ref 35.1–46.3)
EGFRCR SERPLBLD CKD-EPI 2021: 4 ML/MIN/1.73M2 (ref 60–?)
EOSINOPHIL # BLD AUTO: 0.2 X10(3) UL (ref 0–0.7)
EOSINOPHIL NFR BLD AUTO: 6 %
ERYTHROCYTE [DISTWIDTH] IN BLOOD BY AUTOMATED COUNT: 13.5 % (ref 11–15)
GLUCOSE BLD-MCNC: 86 MG/DL (ref 70–99)
GLUCOSE BLDC GLUCOMTR-MCNC: 113 MG/DL (ref 70–99)
GLUCOSE BLDC GLUCOMTR-MCNC: 114 MG/DL (ref 70–99)
GLUCOSE BLDC GLUCOMTR-MCNC: 123 MG/DL (ref 70–99)
GLUCOSE BLDC GLUCOMTR-MCNC: 168 MG/DL (ref 70–99)
HCT VFR BLD AUTO: 23.2 %
HEMOCCULT STL QL: NEGATIVE
HGB BLD-MCNC: 8.2 G/DL
IMM GRANULOCYTES # BLD AUTO: 0.01 X10(3) UL (ref 0–1)
IMM GRANULOCYTES NFR BLD: 0.3 %
LYMPHOCYTES # BLD AUTO: 0.67 X10(3) UL (ref 1–4)
LYMPHOCYTES NFR BLD AUTO: 20 %
MAGNESIUM SERPL-MCNC: 2.2 MG/DL (ref 1.6–2.6)
MCH RBC QN AUTO: 28.1 PG (ref 26–34)
MCHC RBC AUTO-ENTMCNC: 35.3 G/DL (ref 31–37)
MCV RBC AUTO: 79.5 FL
MONOCYTES # BLD AUTO: 0.37 X10(3) UL (ref 0.1–1)
MONOCYTES NFR BLD AUTO: 11 %
NEUTROPHILS # BLD AUTO: 2.09 X10 (3) UL (ref 1.5–7.7)
NEUTROPHILS # BLD AUTO: 2.09 X10(3) UL (ref 1.5–7.7)
NEUTROPHILS NFR BLD AUTO: 62.4 %
OSMOLALITY SERPL CALC.SUM OF ELEC: 318 MOSM/KG (ref 275–295)
PHOSPHATE SERPL-MCNC: 6.5 MG/DL (ref 2.5–4.9)
PLATELET # BLD AUTO: 236 10(3)UL (ref 150–450)
POTASSIUM SERPL-SCNC: 3.3 MMOL/L (ref 3.5–5.1)
RBC # BLD AUTO: 2.92 X10(6)UL
SODIUM SERPL-SCNC: 138 MMOL/L (ref 136–145)
UNIT VOLUME: 350 ML
WBC # BLD AUTO: 3.4 X10(3) UL (ref 4–11)

## 2023-08-03 PROCEDURE — 99233 SBSQ HOSP IP/OBS HIGH 50: CPT | Performed by: INTERNAL MEDICINE

## 2023-08-03 RX ORDER — HEPARIN SODIUM 1000 [USP'U]/ML
INJECTION, SOLUTION INTRAVENOUS; SUBCUTANEOUS
Status: COMPLETED
Start: 2023-08-03 | End: 2023-08-03

## 2023-08-03 RX ORDER — POTASSIUM CHLORIDE 20 MEQ/1
20 TABLET, EXTENDED RELEASE ORAL ONCE
Status: COMPLETED | OUTPATIENT
Start: 2023-08-03 | End: 2023-08-03

## 2023-08-03 NOTE — PLAN OF CARE
Pt alert . HD done last night.    Problem: Diabetes/Glucose Control  Goal: Glucose maintained within prescribed range  Description: INTERVENTIONS:  - Monitor Blood Glucose as ordered  - Assess for signs and symptoms of hyperglycemia and hypoglycemia  - Administer ordered medications to maintain glucose within target range  - Assess barriers to adequate nutritional intake and initiate nutrition consult as needed  - Instruct patient on self management of diabetes  Outcome: Progressing    Problem: METABOLIC/FLUID AND ELECTROLYTES - ADULT  Goal: Glucose maintained within prescribed range  Description: INTERVENTIONS:  - Monitor Blood Glucose as ordered  - Assess for signs and symptoms of hyperglycemia and hypoglycemia  - Administer ordered medications to maintain glucose within target range  - Assess barriers to adequate nutritional intake and initiate nutrition consult as needed  - Instruct patient on self management of diabetes  8/3/2023 0540 by Carly Perez RN  Outcome: Progressing  8/3/2023 0540 by Carly Perez RN  Outcome: Progressing  Goal: Electrolytes maintained within normal limits  Description: INTERVENTIONS:  - Monitor labs and rhythm and assess patient for signs and symptoms of electrolyte imbalances  - Administer electrolyte replacement as ordered  - Monitor response to electrolyte replacements, including rhythm and repeat lab results as appropriate  - Fluid restriction as ordered  - Instruct patient on fluid and nutrition restrictions as appropriate  Outcome: Progressing  Goal: Hemodynamic stability and optimal renal function maintained  Description: INTERVENTIONS:  - Monitor labs and assess for signs and symptoms of volume excess or deficit  - Monitor intake, output and patient weight  - Monitor urine specific gravity, serum osmolarity and serum sodium as indicated or ordered  - Monitor response to interventions for patient's volume status, including labs, urine output, blood pressure (other measures as available)  - Encourage oral intake as appropriate  - Instruct patient on fluid and nutrition restrictions as appropriate  Outcome: Progressing

## 2023-08-03 NOTE — CM/SW NOTE
07: 55AM  IR Tunneled Cath report, chest xray, and Hep B Profile sent to [address] [place] [address] via A[employer]. Briefly spoke to [address] from the HD clinic yesterday and confirmed to touch base later today for final HD chair time. 10:50AM  Obtained HD flowsheet from chart and sent to [facility] via [employer]. Spoke to [address] w/ [address] [place] [address] - confirmed chair time of MW at 3:50PM.     Pt's start of care date is Monday, August 7th at 3:50PM.    Pt has been updated via his room phone. He is agreeable. HD information placed on pt's chart and put in pt's AVS.     PLAN: Home w/ Outpatient HD at [address] [place] [address] ([facility], [facility] 8/7 @ 3:50PM) - pending med clear     SW/CM to remain available for support and/or discharge planning.             Temi Freedman, MSW, [address] [facility]

## 2023-08-03 NOTE — DISCHARGE INSTRUCTIONS
Outpatient Dialysis:  Address: [address] [place] [address], [address] [place] [address], [place], [place]  Phone #: [phone]  Schedule: Monday/Wednesday/Friday @ 3:50PM  First Appt: Monday, August 7th @ 3:50PM      Make sure to monitor your blood pressure and document readings for your next follow up appointment

## 2023-08-03 NOTE — PLAN OF CARE
Mr. Jessica Parada is A&O x 4 with some forgetfulness. Lives at home with son. Independent assist with ADLs. Continent of bowel and bladder. VSS:  stable. Denies pain. Plan:  HD, no electrolyte protocol d/t no longer acceptable. HD tomorrow, then dc  Will continue to monitor and treat.     Problem: Patient Centered Care  Goal: Patient preferences are identified and integrated in the patient's plan of care  Description: Interventions:  - What would you like us to know as we care for you?   - Provide timely, complete, and accurate information to patient/family  - Incorporate patient and family knowledge, values, beliefs, and cultural backgrounds into the planning and delivery of care  - Encourage patient/family to participate in care and decision-making at the level they choose  - Honor patient and family perspectives and choices  Outcome: Progressing     Problem: Diabetes/Glucose Control  Goal: Glucose maintained within prescribed range  Description: INTERVENTIONS:  - Monitor Blood Glucose as ordered  - Assess for signs and symptoms of hyperglycemia and hypoglycemia  - Administer ordered medications to maintain glucose within target range  - Assess barriers to adequate nutritional intake and initiate nutrition consult as needed  - Instruct patient on self management of diabetes  Outcome: Progressing     Problem: Patient/Family Goals  Goal: Patient/Family Long Term Goal  Description: Patient's Long Term Goal: Discharge Home    Interventions:  - medication management, education, and compliance  - See additional Care Plan goals for specific interventions  Outcome: Progressing  Goal: Patient/Family Short Term Goal  Description: Patient's Short Term Goal: maintain adequate kidney profusion    Interventions:   - medication management, education, and compliance  - See additional Care Plan goals for specific interventions  Outcome: Progressing     Problem: METABOLIC/FLUID AND ELECTROLYTES - ADULT  Goal: Glucose maintained within prescribed range  Description: INTERVENTIONS:  - Monitor Blood Glucose as ordered  - Assess for signs and symptoms of hyperglycemia and hypoglycemia  - Administer ordered medications to maintain glucose within target range  - Assess barriers to adequate nutritional intake and initiate nutrition consult as needed  - Instruct patient on self management of diabetes  Outcome: Progressing  Goal: Electrolytes maintained within normal limits  Description: INTERVENTIONS:  - Monitor labs and rhythm and assess patient for signs and symptoms of electrolyte imbalances  - Administer electrolyte replacement as ordered  - Monitor response to electrolyte replacements, including rhythm and repeat lab results as appropriate  - Fluid restriction as ordered  - Instruct patient on fluid and nutrition restrictions as appropriate  Outcome: Progressing  Goal: Hemodynamic stability and optimal renal function maintained  Description: INTERVENTIONS:  - Monitor labs and assess for signs and symptoms of volume excess or deficit  - Monitor intake, output and patient weight  - Monitor urine specific gravity, serum osmolarity and serum sodium as indicated or ordered  - Monitor response to interventions for patient's volume status, including labs, urine output, blood pressure (other measures as available)  - Encourage oral intake as appropriate  - Instruct patient on fluid and nutrition restrictions as appropriate  Outcome: Progressing     Problem: CARDIOVASCULAR - ADULT  Goal: Maintains optimal cardiac output and hemodynamic stability  Description: INTERVENTIONS:  - Monitor vital signs, rhythm, and trends  - Monitor for bleeding, hypotension and signs of decreased cardiac output  - Evaluate effectiveness of vasoactive medications to optimize hemodynamic stability  - Monitor arterial and/or venous puncture sites for bleeding and/or hematoma  - Assess quality of pulses, skin color and temperature  - Assess for signs of decreased coronary artery perfusion - ex.  Angina  - Evaluate fluid balance, assess for edema, trend weights  Outcome: Progressing  Goal: Absence of cardiac arrhythmias or at baseline  Description: INTERVENTIONS:  - Continuous cardiac monitoring, monitor vital signs, obtain 12 lead EKG if indicated  - Evaluate effectiveness of antiarrhythmic and heart rate control medications as ordered  - Initiate emergency measures for life threatening arrhythmias  - Monitor electrolytes and administer replacement therapy as ordered  Outcome: Progressing     Problem: HEMATOLOGIC - ADULT  Goal: Maintains hematologic stability  Description: INTERVENTIONS  - Assess for signs and symptoms of bleeding or hemorrhage  - Monitor labs and vital signs for trends  - Administer supportive blood products/factors, fluids and medications as ordered and appropriate  - Administer supportive blood products/factors as ordered and appropriate  Outcome: Progressing  Goal: Free from bleeding injury  Description: (Example usage: patient with low platelets)  INTERVENTIONS:  - Avoid intramuscular injections, enemas and rectal medication administration  - Ensure safe mobilization of patient  - Hold pressure on venipuncture sites to achieve adequate hemostasis  - Assess for signs and symptoms of internal bleeding  - Monitor lab trends  - Patient is to report abnormal signs of bleeding to staff  - Avoid use of toothpicks and dental floss  - Use electric shaver for shaving  - Use soft bristle tooth brush  - Limit straining and forceful nose blowing  Outcome: Progressing

## 2023-08-04 VITALS
DIASTOLIC BLOOD PRESSURE: 87 MMHG | SYSTOLIC BLOOD PRESSURE: 130 MMHG | OXYGEN SATURATION: 100 % | RESPIRATION RATE: 17 BRPM | WEIGHT: 183.13 LBS | TEMPERATURE: 98 F | HEIGHT: 67 IN | HEART RATE: 83 BPM | BODY MASS INDEX: 28.74 KG/M2

## 2023-08-04 LAB
ALBUMIN SERPL-MCNC: 2.3 G/DL (ref 3.4–5)
ANION GAP SERPL CALC-SCNC: 8 MMOL/L (ref 0–18)
BASOPHILS # BLD AUTO: 0.01 X10(3) UL (ref 0–0.2)
BASOPHILS NFR BLD AUTO: 0.2 %
BUN BLD-MCNC: 62 MG/DL (ref 7–18)
BUN/CREAT SERPL: 7 (ref 10–20)
CALCIUM BLD-MCNC: 7.1 MG/DL (ref 8.5–10.1)
CHLORIDE SERPL-SCNC: 106 MMOL/L (ref 98–112)
CO2 SERPL-SCNC: 25 MMOL/L (ref 21–32)
CREAT BLD-MCNC: 8.8 MG/DL
DEPRECATED RDW RBC AUTO: 41.4 FL (ref 35.1–46.3)
EGFRCR SERPLBLD CKD-EPI 2021: 7 ML/MIN/1.73M2 (ref 60–?)
EOSINOPHIL # BLD AUTO: 0.29 X10(3) UL (ref 0–0.7)
EOSINOPHIL NFR BLD AUTO: 6.9 %
ERYTHROCYTE [DISTWIDTH] IN BLOOD BY AUTOMATED COUNT: 13.9 % (ref 11–15)
GLUCOSE BLD-MCNC: 104 MG/DL (ref 70–99)
GLUCOSE BLDC GLUCOMTR-MCNC: 141 MG/DL (ref 70–99)
GLUCOSE BLDC GLUCOMTR-MCNC: 96 MG/DL (ref 70–99)
HCT VFR BLD AUTO: 25.6 %
HGB BLD-MCNC: 8.9 G/DL
IMM GRANULOCYTES # BLD AUTO: 0.01 X10(3) UL (ref 0–1)
IMM GRANULOCYTES NFR BLD: 0.2 %
LYMPHOCYTES # BLD AUTO: 0.81 X10(3) UL (ref 1–4)
LYMPHOCYTES NFR BLD AUTO: 19.3 %
MAGNESIUM SERPL-MCNC: 2.1 MG/DL (ref 1.6–2.6)
MCH RBC QN AUTO: 28.6 PG (ref 26–34)
MCHC RBC AUTO-ENTMCNC: 34.8 G/DL (ref 31–37)
MCV RBC AUTO: 82.3 FL
MONOCYTES # BLD AUTO: 0.57 X10(3) UL (ref 0.1–1)
MONOCYTES NFR BLD AUTO: 13.6 %
NEUTROPHILS # BLD AUTO: 2.51 X10 (3) UL (ref 1.5–7.7)
NEUTROPHILS # BLD AUTO: 2.51 X10(3) UL (ref 1.5–7.7)
NEUTROPHILS NFR BLD AUTO: 59.8 %
OSMOLALITY SERPL CALC.SUM OF ELEC: 306 MOSM/KG (ref 275–295)
PHOSPHATE SERPL-MCNC: 4.6 MG/DL (ref 2.5–4.9)
PLATELET # BLD AUTO: 236 10(3)UL (ref 150–450)
POTASSIUM SERPL-SCNC: 4 MMOL/L (ref 3.5–5.1)
RBC # BLD AUTO: 3.11 X10(6)UL
SODIUM SERPL-SCNC: 139 MMOL/L (ref 136–145)
VIT D 1,25 DI-OH: <5 PG/ML
WBC # BLD AUTO: 4.2 X10(3) UL (ref 4–11)

## 2023-08-04 PROCEDURE — 90935 HEMODIALYSIS ONE EVALUATION: CPT | Performed by: INTERNAL MEDICINE

## 2023-08-04 RX ORDER — LOSARTAN POTASSIUM AND HYDROCHLOROTHIAZIDE 25; 100 MG/1; MG/1
1 TABLET ORAL DAILY
Qty: 30 TABLET | Refills: 0 | Status: SHIPPED | OUTPATIENT
Start: 2023-08-04 | End: 2023-09-03

## 2023-08-04 RX ORDER — SEVELAMER CARBONATE 800 MG/1
800 TABLET, FILM COATED ORAL
Qty: 90 TABLET | Refills: 0 | Status: SHIPPED | OUTPATIENT
Start: 2023-08-04 | End: 2023-09-03

## 2023-08-04 RX ORDER — METOPROLOL SUCCINATE 25 MG/1
25 TABLET, EXTENDED RELEASE ORAL
Qty: 30 TABLET | Refills: 0 | Status: SHIPPED | OUTPATIENT
Start: 2023-08-04 | End: 2023-09-03

## 2023-08-04 RX ORDER — CLONIDINE HYDROCHLORIDE 0.1 MG/1
0.1 TABLET ORAL 2 TIMES DAILY
Qty: 60 TABLET | Refills: 0 | Status: SHIPPED | OUTPATIENT
Start: 2023-08-04 | End: 2023-09-03

## 2023-08-04 RX ORDER — METOPROLOL SUCCINATE 25 MG/1
25 TABLET, EXTENDED RELEASE ORAL
Status: DISCONTINUED | OUTPATIENT
Start: 2023-08-04 | End: 2023-08-04

## 2023-08-04 RX ORDER — CLONIDINE HYDROCHLORIDE 0.1 MG/1
0.1 TABLET ORAL 2 TIMES DAILY
Status: DISCONTINUED | OUTPATIENT
Start: 2023-08-04 | End: 2023-08-04

## 2023-08-04 NOTE — CM/SW NOTE
08/04/23 0900   Discharge disposition   Expected discharge disposition Home or Self   Outpatient services Dialysis  ([address] (MWF at 67))   Discharge transportation Private car       Per chart, pt has DC order for today. Pt to DC after HD treatment. Pt's outpatient HD has been arranged to start on Monday 8/7/23 at 3:50PM. Information is in pt's AVS.    Pt is cleared from SW/CM stand point.          PLAN: Home w/ Outpatient HD at [address] (MWF @ 67), [facility]          Deepak Backer, MSW, [address] [facility]

## 2023-08-04 NOTE — PLAN OF CARE
Pt A&O x4. Denies any pain or SOB. Plan for dialysis today and possible discharge home.   Problem: Patient Centered Care  Goal: Patient preferences are identified and integrated in the patient's plan of care  Description: Interventions:  - What would you like us to know as we care for you?   - Provide timely, complete, and accurate information to patient/family  - Incorporate patient and family knowledge, values, beliefs, and cultural backgrounds into the planning and delivery of care  - Encourage patient/family to participate in care and decision-making at the level they choose  - Honor patient and family perspectives and choices  Outcome: Progressing     Problem: Diabetes/Glucose Control  Goal: Glucose maintained within prescribed range  Description: INTERVENTIONS:  - Monitor Blood Glucose as ordered  - Assess for signs and symptoms of hyperglycemia and hypoglycemia  - Administer ordered medications to maintain glucose within target range  - Assess barriers to adequate nutritional intake and initiate nutrition consult as needed  - Instruct patient on self management of diabetes  Outcome: Progressing     Problem: Patient/Family Goals  Goal: Patient/Family Long Term Goal  Description: Patient's Long Term Goal:    Interventions:  - See additional Care Plan goals for specific interventions  Outcome: Progressing  Goal: Patient/Family Short Term Goal  Description: Patient's Short Term Goal:     Interventions:   - See additional Care Plan goals for specific interventions  Outcome: Progressing     Problem: METABOLIC/FLUID AND ELECTROLYTES - ADULT  Goal: Glucose maintained within prescribed range  Description: INTERVENTIONS:  - Monitor Blood Glucose as ordered  - Assess for signs and symptoms of hyperglycemia and hypoglycemia  - Administer ordered medications to maintain glucose within target range  - Assess barriers to adequate nutritional intake and initiate nutrition consult as needed  - Instruct patient on self management of diabetes  Outcome: Progressing  Goal: Electrolytes maintained within normal limits  Description: INTERVENTIONS:  - Monitor labs and rhythm and assess patient for signs and symptoms of electrolyte imbalances  - Administer electrolyte replacement as ordered  - Monitor response to electrolyte replacements, including rhythm and repeat lab results as appropriate  - Fluid restriction as ordered  - Instruct patient on fluid and nutrition restrictions as appropriate  Outcome: Progressing  Goal: Hemodynamic stability and optimal renal function maintained  Description: INTERVENTIONS:  - Monitor labs and assess for signs and symptoms of volume excess or deficit  - Monitor intake, output and patient weight  - Monitor urine specific gravity, serum osmolarity and serum sodium as indicated or ordered  - Monitor response to interventions for patient's volume status, including labs, urine output, blood pressure (other measures as available)  - Encourage oral intake as appropriate  - Instruct patient on fluid and nutrition restrictions as appropriate  Outcome: Progressing     Problem: CARDIOVASCULAR - ADULT  Goal: Maintains optimal cardiac output and hemodynamic stability  Description: INTERVENTIONS:  - Monitor vital signs, rhythm, and trends  - Monitor for bleeding, hypotension and signs of decreased cardiac output  - Evaluate effectiveness of vasoactive medications to optimize hemodynamic stability  - Monitor arterial and/or venous puncture sites for bleeding and/or hematoma  - Assess quality of pulses, skin color and temperature  - Assess for signs of decreased coronary artery perfusion - ex.  Angina  - Evaluate fluid balance, assess for edema, trend weights  Outcome: Progressing  Goal: Absence of cardiac arrhythmias or at baseline  Description: INTERVENTIONS:  - Continuous cardiac monitoring, monitor vital signs, obtain 12 lead EKG if indicated  - Evaluate effectiveness of antiarrhythmic and heart rate control medications as ordered  - Initiate emergency measures for life threatening arrhythmias  - Monitor electrolytes and administer replacement therapy as ordered  Outcome: Progressing     Problem: HEMATOLOGIC - ADULT  Goal: Maintains hematologic stability  Description: INTERVENTIONS  - Assess for signs and symptoms of bleeding or hemorrhage  - Monitor labs and vital signs for trends  - Administer supportive blood products/factors, fluids and medications as ordered and appropriate  - Administer supportive blood products/factors as ordered and appropriate  Outcome: Progressing  Goal: Free from bleeding injury  Description: (Example usage: patient with low platelets)  INTERVENTIONS:  - Avoid intramuscular injections, enemas and rectal medication administration  - Ensure safe mobilization of patient  - Hold pressure on venipuncture sites to achieve adequate hemostasis  - Assess for signs and symptoms of internal bleeding  - Monitor lab trends  - Patient is to report abnormal signs of bleeding to staff  - Avoid use of toothpicks and dental floss  - Use electric shaver for shaving  - Use soft bristle tooth brush  - Limit straining and forceful nose blowing  Outcome: Progressing

## 2023-08-04 NOTE — DISCHARGE PLANNING
Patient was provided with discharge instructions, education, and follow up information. Prescriptions were already sent electronically to patient's pharmacy. Patient verbalizes understanding of follow up information, specifically PCP, nephrology, and plan for dialysis on Monday. Patient has no questions after reviewing all instructions and will be going home.      Latrice Mak, Discharge Leader [id number]

## 2023-08-04 NOTE — PLAN OF CARE
Mr. Sneha Lo is A&O x 4. Lives at home with son. Independent assist with ADLs. Continent of bowel and bladder. VSS:  HTN; MD notified. Denies pain. Plan:  1,000ml removed during HD, MD to assess pt then ok to discharge home. Will continue to monitor and treat.     Problem: Patient Centered Care  Goal: Patient preferences are identified and integrated in the patient's plan of care  Description: Interventions:  - What would you like us to know as we care for you?   - Provide timely, complete, and accurate information to patient/family  - Incorporate patient and family knowledge, values, beliefs, and cultural backgrounds into the planning and delivery of care  - Encourage patient/family to participate in care and decision-making at the level they choose  - Honor patient and family perspectives and choices  Outcome: Completed     Problem: Diabetes/Glucose Control  Goal: Glucose maintained within prescribed range  Description: INTERVENTIONS:  - Monitor Blood Glucose as ordered  - Assess for signs and symptoms of hyperglycemia and hypoglycemia  - Administer ordered medications to maintain glucose within target range  - Assess barriers to adequate nutritional intake and initiate nutrition consult as needed  - Instruct patient on self management of diabetes  Outcome: Completed     Problem: Patient/Family Goals  Goal: Patient/Family Long Term Goal  Description: Patient's Long Term Goal: Discharge Home    Interventions:  - medication management, education, and compliance  - See additional Care Plan goals for specific interventions  Outcome: Completed  Goal: Patient/Family Short Term Goal  Description: Patient's Short Term Goal: maintain adequate kidney function    Interventions:   - medication management, education, and compliance  - See additional Care Plan goals for specific interventions  Outcome: Completed     Problem: ALTERED NUTRIENT INTAKE - ADULT  Goal: Nutrient intake appropriate for improving, restoring or maintaining nutritional needs  Description: INTERVENTIONS:  - Assess nutritional status and recommend course of action  - Monitor oral intake, labs, and treatment plans  - Recommend appropriate diets, oral nutritional supplements, and vitamin/mineral supplements  - Recommend, monitor, and adjust tube feedings and TPN/PPN based on assessed needs  - Provide specific nutrition education as appropriate  Outcome: Completed     Problem: METABOLIC/FLUID AND ELECTROLYTES - ADULT  Goal: Glucose maintained within prescribed range  Description: INTERVENTIONS:  - Monitor Blood Glucose as ordered  - Assess for signs and symptoms of hyperglycemia and hypoglycemia  - Administer ordered medications to maintain glucose within target range  - Assess barriers to adequate nutritional intake and initiate nutrition consult as needed  - Instruct patient on self management of diabetes  Outcome: Completed  Goal: Electrolytes maintained within normal limits  Description: INTERVENTIONS:  - Monitor labs and rhythm and assess patient for signs and symptoms of electrolyte imbalances  - Administer electrolyte replacement as ordered  - Monitor response to electrolyte replacements, including rhythm and repeat lab results as appropriate  - Fluid restriction as ordered  - Instruct patient on fluid and nutrition restrictions as appropriate  Outcome: Completed  Goal: Hemodynamic stability and optimal renal function maintained  Description: INTERVENTIONS:  - Monitor labs and assess for signs and symptoms of volume excess or deficit  - Monitor intake, output and patient weight  - Monitor urine specific gravity, serum osmolarity and serum sodium as indicated or ordered  - Monitor response to interventions for patient's volume status, including labs, urine output, blood pressure (other measures as available)  - Encourage oral intake as appropriate  - Instruct patient on fluid and nutrition restrictions as appropriate  Outcome: Completed     Problem: CARDIOVASCULAR - ADULT  Goal: Maintains optimal cardiac output and hemodynamic stability  Description: INTERVENTIONS:  - Monitor vital signs, rhythm, and trends  - Monitor for bleeding, hypotension and signs of decreased cardiac output  - Evaluate effectiveness of vasoactive medications to optimize hemodynamic stability  - Monitor arterial and/or venous puncture sites for bleeding and/or hematoma  - Assess quality of pulses, skin color and temperature  - Assess for signs of decreased coronary artery perfusion - ex.  Angina  - Evaluate fluid balance, assess for edema, trend weights  Outcome: Completed  Goal: Absence of cardiac arrhythmias or at baseline  Description: INTERVENTIONS:  - Continuous cardiac monitoring, monitor vital signs, obtain 12 lead EKG if indicated  - Evaluate effectiveness of antiarrhythmic and heart rate control medications as ordered  - Initiate emergency measures for life threatening arrhythmias  - Monitor electrolytes and administer replacement therapy as ordered  Outcome: Completed     Problem: HEMATOLOGIC - ADULT  Goal: Maintains hematologic stability  Description: INTERVENTIONS  - Assess for signs and symptoms of bleeding or hemorrhage  - Monitor labs and vital signs for trends  - Administer supportive blood products/factors, fluids and medications as ordered and appropriate  - Administer supportive blood products/factors as ordered and appropriate  Outcome: Completed  Goal: Free from bleeding injury  Description: (Example usage: patient with low platelets)  INTERVENTIONS:  - Avoid intramuscular injections, enemas and rectal medication administration  - Ensure safe mobilization of patient  - Hold pressure on venipuncture sites to achieve adequate hemostasis  - Assess for signs and symptoms of internal bleeding  - Monitor lab trends  - Patient is to report abnormal signs of bleeding to staff  - Avoid use of toothpicks and dental floss  - Use electric shaver for shaving  - Use soft bristle tooth brush  - Limit straining and forceful nose blowing  Outcome: Completed

## 2023-08-07 ENCOUNTER — PATIENT OUTREACH (OUTPATIENT)
Dept: CASE MANAGEMENT | Age: 58
End: 2023-08-07

## 2023-08-07 DIAGNOSIS — E87.20 METABOLIC ACIDOSIS: Primary | ICD-10-CM

## 2023-08-07 DIAGNOSIS — Z02.9 ENCOUNTERS FOR UNSPECIFIED ADMINISTRATIVE PURPOSE: ICD-10-CM

## 2023-08-07 PROCEDURE — 1111F DSCHRG MED/CURRENT MED MERGE: CPT

## 2023-08-07 NOTE — PROGRESS NOTES
Left message on mailbox for pt to call NCM back for TCM. [facility] contact information [phone] included in message. Discharge Dx:     Progressive renal failure with now end-stage renal disease with uremia requiring initiation of HD  Metabolic acidosis.   Hyperphosphatemia  Outpatient Dialysis:  Address: [address] [place] [address], [address] [facility], [place]  Phone #: [phone]  Schedule: Monday/Wednesday/Friday @ 3:50PM  First Appt: Monday, August 7th @ 3:50PM       Follow up appointments:      Make sure to monitor your blood pressure and document readings for  your next follow up appointment    Follow up WithSpecialtiesDetailsWhyContact InfoLinchangco, Nathan Candelario MDInternal MedicineSchedule an appointment as soon as possible for a visitAs atexga[address] [place] [address] [phone]    Jaqui Jones MDNEROLOGYAshtabula County Medical Centerr. [facility] office will call [place] [address] [phone]    Future Appointments   Date Time Provider Mihaela Caban   8/11/2023 11:20 AM Jaqui Jones MD [id number] [facility]   9/8/2023  2:30 PM Marielena Cano MD [facility] ADO   9/18/2023  3:20 PM Soto Keen MD [facility] & CLIN[facility]

## 2023-08-08 ENCOUNTER — TELEPHONE (OUTPATIENT)
Dept: INTERNAL MEDICINE CLINIC | Facility: CLINIC | Age: 58
End: 2023-08-08

## 2023-08-08 NOTE — PROGRESS NOTES
Left message on mailbox for pt to call [facility] back for TCM. [facility] contact information [phone] included in message.

## 2023-08-08 NOTE — TELEPHONE ENCOUNTER
Spoke with pt for TCM today. Pt agreeable to TCM appt with Dr. Jessica Greer [facility] unable to book, d/t schedule limitations. Pt requesting late morning appt on a Monday, Wednesday or Friday, as he will be in the area for his afternoon dialysis treatments. Pt also requesting new glucometer and supplies to [address] [place] [address] in [facility], as his current machine is broken. TCM appt recommended by 8/18/2023, as pt is a moderate risk for readmission. Please advise. Please discuss with PCP and f/u with pt, accordingly. Thank you!          Follow up With Specialties Details Why Contact Info   Edwar Aj MD Internal Medicine Schedule an appointment as soon as possible for a visit As needed [address]  [phone]   Madelyn Ladd MD NEPHROLOGY Follow up Dr. Waldo Solis office will call [facility]aring peritoneal dialysis [facility] [phone]     Outpatient Dialysis:  Address: [address] [place] [address], [address] [place] [address]  Phone #: [phone]  Schedule: Monday/Wednesday/Friday @ 3:50PM  First Appt: Monday, August 7th @ 3:50PM    Future Appointments   Date Time Provider Mihaela Mee   8/11/2023 11:20 AM Madelyn Ladd MD [id number] [facility]   9/8/2023  2:30 PM Edwar Aj MD [facility]   9/18/2023  3:20 PM Ck Saavedra MD [facility] spouse

## 2023-08-11 ENCOUNTER — OFFICE VISIT (OUTPATIENT)
Dept: NEPHROLOGY | Facility: CLINIC | Age: 58
End: 2023-08-11

## 2023-08-11 VITALS
SYSTOLIC BLOOD PRESSURE: 126 MMHG | DIASTOLIC BLOOD PRESSURE: 83 MMHG | BODY MASS INDEX: 31.08 KG/M2 | HEIGHT: 67 IN | HEART RATE: 84 BPM | WEIGHT: 198 LBS

## 2023-08-11 DIAGNOSIS — N18.6 ESRD ON HEMODIALYSIS (HCC): Primary | ICD-10-CM

## 2023-08-11 DIAGNOSIS — Z99.2 ESRD ON HEMODIALYSIS (HCC): Primary | ICD-10-CM

## 2023-08-11 PROCEDURE — 3074F SYST BP LT 130 MM HG: CPT | Performed by: INTERNAL MEDICINE

## 2023-08-11 PROCEDURE — 99214 OFFICE O/P EST MOD 30 MIN: CPT | Performed by: INTERNAL MEDICINE

## 2023-08-11 PROCEDURE — 3079F DIAST BP 80-89 MM HG: CPT | Performed by: INTERNAL MEDICINE

## 2023-08-11 PROCEDURE — 3008F BODY MASS INDEX DOCD: CPT | Performed by: INTERNAL MEDICINE

## 2023-08-11 NOTE — PROGRESS NOTES
08/11/23        Patient: Tobi [place]   YOB: 1965   Date of Visit: 8/11/2023       Dear  Dr. Vanessa Cuello MD,      Thank you for referring Tobi [place] to my practice. Please find my assessment and plan below. As you know he is a 29-year-old -American male with a history of adult onset diabetes mellitus, hypertension who I now had the pleasure of seeing for follow-up of end-stage renal disease on chronic hemodialysis thought to be secondary to diabetic nephropathy. Patient had been poorly compliant with follow-up and had not been doing laboratory studies as ordered. Patient called us though and stated that he was not feeling very good and was having problems with nausea, anorexia and fatigue. We advised him to do laboratory studies ASAP which she did on August 1, 2023. His BUN was 152 with a creatinine of 15.7 with a hemoglobin of 7.8. The patient was directed to the emergency room. There is no reversible components and therefore chronic hemodialysis was initiated. He currently has a tunneled hemodialysis catheter in place. He had 3 treatments and was subsequently discharged home. He is currently receiving hemodialysis on a Monday Wednesday Friday schedule. Overall the patient states he is feeling much better. Appetite and energy level have improved. Tolerating hemodialysis without any difficulty. On physical exam his blood pressure 126/83 with a pulse of 84 and he weighed 198 pounds. His neck was supple without JVD. Lungs were clear. Heart revealed a regular rate and rhythm with an S4 but no gallops, murmurs or rubs. Abdomen was soft, flat, nontender without organomegaly, masses or bruits. Extremities revealed no edema. Overall the patient has improved significantly with initiation of chronic hemodialysis. Appetite is very good. Energy level is improving. Volume status is good as his blood pressure.   I have recommended that he see our peritoneal dialysis nurse to review options including PD and renal transplantation. If he should opt for chronic hemodialysis will then need an AV fistula. Thank you again for allowing me to participate in the care of your patient. If you have any questions please feel free to call.            Sincerely,   Cj Fay MD   [facility], [address] [place] [address]  [place] [address] [place] [address]  [address] [facility] [address]    Document electronically generated by:  Cj Fay MD

## 2023-08-13 ENCOUNTER — PATIENT MESSAGE (OUTPATIENT)
Dept: NEPHROLOGY | Facility: CLINIC | Age: 58
End: 2023-08-13

## 2023-08-14 ENCOUNTER — TELEPHONE (OUTPATIENT)
Dept: NEPHROLOGY | Facility: CLINIC | Age: 58
End: 2023-08-14

## 2023-08-14 ENCOUNTER — MED REC SCAN ONLY (OUTPATIENT)
Dept: NEPHROLOGY | Facility: CLINIC | Age: 58
End: 2023-08-14

## 2023-08-14 NOTE — TELEPHONE ENCOUNTER
Recd STD papers-Beckie- from RN for pt-     Emailed to [address] [place] [address] Road sent interoffice

## 2023-08-18 ENCOUNTER — OFFICE VISIT (OUTPATIENT)
Dept: INTERNAL MEDICINE CLINIC | Facility: CLINIC | Age: 58
End: 2023-08-18

## 2023-08-18 VITALS
DIASTOLIC BLOOD PRESSURE: 81 MMHG | BODY MASS INDEX: 30.59 KG/M2 | SYSTOLIC BLOOD PRESSURE: 125 MMHG | WEIGHT: 194.88 LBS | HEART RATE: 80 BPM | OXYGEN SATURATION: 98 % | HEIGHT: 67 IN | TEMPERATURE: 97 F

## 2023-08-18 DIAGNOSIS — D64.9 NORMOCYTIC ANEMIA: ICD-10-CM

## 2023-08-18 DIAGNOSIS — E11.3493 SEVERE NONPROLIFERATIVE DIABETIC RETINOPATHY OF BOTH EYES ASSOCIATED WITH TYPE 2 DIABETES MELLITUS, MACULAR EDEMA PRESENCE UNSPECIFIED (HCC): ICD-10-CM

## 2023-08-18 DIAGNOSIS — I10 ESSENTIAL HYPERTENSION: ICD-10-CM

## 2023-08-18 DIAGNOSIS — E11.21 CONTROLLED TYPE 2 DIABETES MELLITUS WITH DIABETIC NEPHROPATHY, WITHOUT LONG-TERM CURRENT USE OF INSULIN (HCC): ICD-10-CM

## 2023-08-18 DIAGNOSIS — N18.6 ESRD (END STAGE RENAL DISEASE) ON DIALYSIS (HCC): Primary | ICD-10-CM

## 2023-08-18 DIAGNOSIS — Z99.2 ESRD (END STAGE RENAL DISEASE) ON DIALYSIS (HCC): Primary | ICD-10-CM

## 2023-08-18 DIAGNOSIS — E78.2 MIXED HYPERLIPIDEMIA: ICD-10-CM

## 2023-08-18 PROCEDURE — 99495 TRANSJ CARE MGMT MOD F2F 14D: CPT | Performed by: INTERNAL MEDICINE

## 2023-08-18 PROCEDURE — 3079F DIAST BP 80-89 MM HG: CPT | Performed by: INTERNAL MEDICINE

## 2023-08-18 PROCEDURE — 3008F BODY MASS INDEX DOCD: CPT | Performed by: INTERNAL MEDICINE

## 2023-08-18 PROCEDURE — 3074F SYST BP LT 130 MM HG: CPT | Performed by: INTERNAL MEDICINE

## 2023-08-30 RX ORDER — METOPROLOL SUCCINATE 25 MG/1
25 TABLET, EXTENDED RELEASE ORAL
Qty: 90 TABLET | Refills: 0 | Status: SHIPPED | OUTPATIENT
Start: 2023-08-30 | End: 2024-02-26

## 2023-08-30 RX ORDER — AMLODIPINE BESYLATE 10 MG/1
10 TABLET ORAL DAILY
Qty: 90 TABLET | Refills: 0 | Status: SHIPPED | OUTPATIENT
Start: 2023-08-30

## 2023-08-30 RX ORDER — CLONIDINE HYDROCHLORIDE 0.1 MG/1
0.1 TABLET ORAL 2 TIMES DAILY
Qty: 180 TABLET | Refills: 0 | Status: SHIPPED | OUTPATIENT
Start: 2023-08-30 | End: 2023-11-28

## 2023-09-01 ENCOUNTER — PATIENT MESSAGE (OUTPATIENT)
Dept: INTERNAL MEDICINE CLINIC | Facility: CLINIC | Age: 58
End: 2023-09-01

## 2023-09-01 RX ORDER — LOSARTAN POTASSIUM AND HYDROCHLOROTHIAZIDE 25; 100 MG/1; MG/1
1 TABLET ORAL DAILY
Qty: 90 TABLET | Refills: 1 | Status: SHIPPED | OUTPATIENT
Start: 2023-09-01

## 2023-09-05 ENCOUNTER — TELEPHONE (OUTPATIENT)
Dept: INTERNAL MEDICINE CLINIC | Facility: CLINIC | Age: 58
End: 2023-09-05

## 2023-09-05 RX ORDER — LOSARTAN POTASSIUM 100 MG/1
100 TABLET ORAL DAILY
Qty: 90 TABLET | Refills: 1 | Status: SHIPPED | OUTPATIENT
Start: 2023-09-05

## 2023-09-05 NOTE — TELEPHONE ENCOUNTER
Per Richelle Begum from pharmacy on file, patient is in dialysis and not recommending the losartan-hydroCHLOROthiazide and they are recommending the losartan alone without the hydrochlorothiazide. Any question can call [phone] . Please advise.

## 2023-09-05 NOTE — TELEPHONE ENCOUNTER
This RN called and spoke with pharmacist Hoda.  She states hydrochlorothiazide not recommended due to patient on dialysis. Asking if Dr Ernesto Gosselin would change to plain losartan? Dr Michele Carrasco, please advise?

## 2023-09-06 NOTE — TELEPHONE ENCOUNTER
Simin Valentin from [employer] [employer] calling checking on medication. I stated the Losartan was sent yesterday to the pharmacy. Gradyne Barbie stated thank you and she will contact the patient and inform. The Losartan hydrochlorothiazide was discontinued yesterday. by Dr Barbie Barron.

## 2023-09-08 ENCOUNTER — HOSPITAL ENCOUNTER (EMERGENCY)
Facility: HOSPITAL | Age: 58
Discharge: HOME | End: 2023-09-08
Attending: EMERGENCY MEDICINE
Payer: COMMERCIAL

## 2023-09-08 ENCOUNTER — APPOINTMENT (OUTPATIENT)
Dept: ULTRASOUND IMAGING | Facility: HOSPITAL | Age: 58
End: 2023-09-08
Attending: EMERGENCY MEDICINE
Payer: COMMERCIAL

## 2023-09-08 ENCOUNTER — OFFICE VISIT (OUTPATIENT)
Dept: INTERNAL MEDICINE CLINIC | Facility: CLINIC | Age: 58
End: 2023-09-08

## 2023-09-08 VITALS
WEIGHT: 193.13 LBS | OXYGEN SATURATION: 97 % | TEMPERATURE: 97 F | DIASTOLIC BLOOD PRESSURE: 81 MMHG | HEIGHT: 67 IN | HEART RATE: 83 BPM | SYSTOLIC BLOOD PRESSURE: 123 MMHG | BODY MASS INDEX: 30.31 KG/M2

## 2023-09-08 VITALS
BODY MASS INDEX: 30 KG/M2 | RESPIRATION RATE: 17 BRPM | SYSTOLIC BLOOD PRESSURE: 167 MMHG | DIASTOLIC BLOOD PRESSURE: 101 MMHG | TEMPERATURE: 98 F | OXYGEN SATURATION: 98 % | HEART RATE: 80 BPM | WEIGHT: 190 LBS

## 2023-09-08 DIAGNOSIS — N18.6 ESRD ON HEMODIALYSIS (HCC): ICD-10-CM

## 2023-09-08 DIAGNOSIS — E11.21 CONTROLLED TYPE 2 DIABETES MELLITUS WITH DIABETIC NEPHROPATHY, WITHOUT LONG-TERM CURRENT USE OF INSULIN (HCC): ICD-10-CM

## 2023-09-08 DIAGNOSIS — M54.2 NECK PAIN: ICD-10-CM

## 2023-09-08 DIAGNOSIS — I82.90 ACUTE DEEP VEIN THROMBOSIS (DVT) OF NON-EXTREMITY VEIN: Primary | ICD-10-CM

## 2023-09-08 DIAGNOSIS — Z99.2 ESRD ON HEMODIALYSIS (HCC): ICD-10-CM

## 2023-09-08 DIAGNOSIS — I10 ESSENTIAL HYPERTENSION: Primary | ICD-10-CM

## 2023-09-08 LAB
ALBUMIN SERPL-MCNC: 3.5 G/DL (ref 3.4–5)
ALBUMIN/GLOB SERPL: 0.8 {RATIO} (ref 1–2)
ALP LIVER SERPL-CCNC: 67 U/L
ALT SERPL-CCNC: 16 U/L
ANION GAP SERPL CALC-SCNC: 11 MMOL/L (ref 0–18)
AST SERPL-CCNC: 17 U/L (ref 15–37)
BASOPHILS # BLD AUTO: 0.03 X10(3) UL (ref 0–0.2)
BASOPHILS NFR BLD AUTO: 0.4 %
BILIRUB SERPL-MCNC: 0.4 MG/DL (ref 0.1–2)
BUN BLD-MCNC: 67 MG/DL (ref 7–18)
BUN/CREAT SERPL: 7.1 (ref 10–20)
CALCIUM BLD-MCNC: 7.9 MG/DL (ref 8.5–10.1)
CARTRIDGE LOT#: NORMAL NUMERIC
CHLORIDE SERPL-SCNC: 99 MMOL/L (ref 98–112)
CO2 SERPL-SCNC: 26 MMOL/L (ref 21–32)
CREAT BLD-MCNC: 9.49 MG/DL
DEPRECATED RDW RBC AUTO: 47.1 FL (ref 35.1–46.3)
EGFRCR SERPLBLD CKD-EPI 2021: 6 ML/MIN/1.73M2 (ref 60–?)
EOSINOPHIL # BLD AUTO: 0.42 X10(3) UL (ref 0–0.7)
EOSINOPHIL NFR BLD AUTO: 6.2 %
ERYTHROCYTE [DISTWIDTH] IN BLOOD BY AUTOMATED COUNT: 14.6 % (ref 11–15)
GLOBULIN PLAS-MCNC: 4.3 G/DL (ref 2.8–4.4)
GLUCOSE BLD-MCNC: 95 MG/DL (ref 70–99)
HCT VFR BLD AUTO: 29.5 %
HEMOGLOBIN A1C: 5.5 % (ref 4.3–5.6)
HGB BLD-MCNC: 9.6 G/DL
IMM GRANULOCYTES # BLD AUTO: 0.01 X10(3) UL (ref 0–1)
IMM GRANULOCYTES NFR BLD: 0.1 %
LYMPHOCYTES # BLD AUTO: 1.31 X10(3) UL (ref 1–4)
LYMPHOCYTES NFR BLD AUTO: 19.4 %
MAGNESIUM SERPL-MCNC: 2 MG/DL (ref 1.6–2.6)
MCH RBC QN AUTO: 28.5 PG (ref 26–34)
MCHC RBC AUTO-ENTMCNC: 32.5 G/DL (ref 31–37)
MCV RBC AUTO: 87.5 FL
MONOCYTES # BLD AUTO: 0.6 X10(3) UL (ref 0.1–1)
MONOCYTES NFR BLD AUTO: 8.9 %
NEUTROPHILS # BLD AUTO: 4.4 X10 (3) UL (ref 1.5–7.7)
NEUTROPHILS # BLD AUTO: 4.4 X10(3) UL (ref 1.5–7.7)
NEUTROPHILS NFR BLD AUTO: 65 %
OSMOLALITY SERPL CALC.SUM OF ELEC: 301 MOSM/KG (ref 275–295)
PLATELET # BLD AUTO: 209 10(3)UL (ref 150–450)
POTASSIUM SERPL-SCNC: 4.3 MMOL/L (ref 3.5–5.1)
PROT SERPL-MCNC: 7.8 G/DL (ref 6.4–8.2)
RBC # BLD AUTO: 3.37 X10(6)UL
SODIUM SERPL-SCNC: 136 MMOL/L (ref 136–145)
WBC # BLD AUTO: 6.8 X10(3) UL (ref 4–11)

## 2023-09-08 PROCEDURE — 36415 COLL VENOUS BLD VENIPUNCTURE: CPT

## 2023-09-08 PROCEDURE — 3079F DIAST BP 80-89 MM HG: CPT | Performed by: INTERNAL MEDICINE

## 2023-09-08 PROCEDURE — 3074F SYST BP LT 130 MM HG: CPT | Performed by: INTERNAL MEDICINE

## 2023-09-08 PROCEDURE — 83036 HEMOGLOBIN GLYCOSYLATED A1C: CPT | Performed by: INTERNAL MEDICINE

## 2023-09-08 PROCEDURE — 3044F HG A1C LEVEL LT 7.0%: CPT | Performed by: INTERNAL MEDICINE

## 2023-09-08 PROCEDURE — 85025 COMPLETE CBC W/AUTO DIFF WBC: CPT | Performed by: EMERGENCY MEDICINE

## 2023-09-08 PROCEDURE — 3008F BODY MASS INDEX DOCD: CPT | Performed by: INTERNAL MEDICINE

## 2023-09-08 PROCEDURE — 93010 ELECTROCARDIOGRAM REPORT: CPT

## 2023-09-08 PROCEDURE — 80053 COMPREHEN METABOLIC PANEL: CPT | Performed by: EMERGENCY MEDICINE

## 2023-09-08 PROCEDURE — 99285 EMERGENCY DEPT VISIT HI MDM: CPT

## 2023-09-08 PROCEDURE — 99213 OFFICE O/P EST LOW 20 MIN: CPT | Performed by: INTERNAL MEDICINE

## 2023-09-08 PROCEDURE — 93971 EXTREMITY STUDY: CPT | Performed by: EMERGENCY MEDICINE

## 2023-09-08 PROCEDURE — 83735 ASSAY OF MAGNESIUM: CPT | Performed by: EMERGENCY MEDICINE

## 2023-09-08 PROCEDURE — 93005 ELECTROCARDIOGRAM TRACING: CPT

## 2023-09-08 RX ORDER — SEVELAMER CARBONATE 800 MG/1
800 TABLET, FILM COATED ORAL
COMMUNITY

## 2023-09-08 NOTE — ED QUICK NOTES
Attempted blood draw x 2 unsuccessful  IV in place and flushes with no resistance, and initially got blood back but unable to collect enough for labs.

## 2023-09-09 LAB
ATRIAL RATE: 77 BPM
P AXIS: 49 DEGREES
P-R INTERVAL: 170 MS
Q-T INTERVAL: 386 MS
QRS DURATION: 98 MS
QTC CALCULATION (BEZET): 436 MS
R AXIS: -8 DEGREES
T AXIS: 27 DEGREES
VENTRICULAR RATE: 77 BPM

## 2023-09-09 NOTE — CM/SW NOTE
[facility]ed Elsie at [address] [facility] [place]. The pt gets his treatments at this location. Requesting a treatment today since he missed his treatment yesterday. They are calling the manager to see if they can get another tech to come in to be able to treat the pt today.

## 2023-09-09 NOTE — DISCHARGE INSTRUCTIONS
The emergency department  will call you tomorrow to arrange for you to get a dialysis session tomorrow.

## 2023-09-10 NOTE — PROGRESS NOTES
Subjective:     Patient ID: Palmira Chavarria is a 56year old male. Patient presented today just wanted to check his blood pressure and also clarify his blood pressure medications. We had taken him off hydrochlorothiazide and kept him on losartan 100 mg daily as well as his amlodipine 10 mg daily and clonidine. We have told him that the hydrochlorothiazide will not even work for him anymore given he is on end-stage renal disease and on dialysis. Neck Pain   This is a new (right side neck pain where his dialysis catheter is located) problem. The current episode started in the past 7 days. The problem occurs constantly. The problem has been unchanged. The pain is associated with nothing. The pain is present in the right side. The quality of the pain is described as aching. The pain is mild. Exacerbated by: moving his neck. Pertinent negatives include no fever, numbness or weakness. He has tried nothing for the symptoms. The treatment provided no relief. History/Other:   Review of Systems   Constitutional: Negative. Negative for fever. Respiratory: Negative. Cardiovascular: Negative. Musculoskeletal:  Positive for neck pain. Neurological:  Negative for weakness and numbness. Current Outpatient Medications   Medication Sig Dispense Refill    sevelamer carbonate 800 MG Oral Tab Take 1 tablet (800 mg total) by mouth 3 (three) times daily with meals. losartan 100 MG Oral Tab Take 1 tablet (100 mg total) by mouth daily. 90 tablet 1    amLODIPine 10 MG Oral Tab Take 1 tablet (10 mg total) by mouth daily. 90 tablet 0    cloNIDine 0.1 MG Oral Tab Take 1 tablet (0.1 mg total) by mouth 2 (two) times daily. 180 tablet 0    metoprolol succinate ER 25 MG Oral Tablet 24 Hr Take 1 tablet (25 mg total) by mouth Daily Beta Blocker. 90 tablet 0    atorvastatin 80 MG Oral Tab Take 1 tablet (80 mg total) by mouth nightly.  90 tablet 1    apixaban 5 MG Oral Tab Take 2 tabs (10mg) by mouth twice daily for 7 days, then take 1 tab (5mg) by mouth twice daily thereafter. 74 tablet 0    Blood Glucose Monitoring Suppl (ONETOUCH ULTRA 2) w/Device Does not apply Kit Check glucose daily 1 kit 0    Glucose Blood (ONETOUCH ULTRA BLUE) In Vitro Strip Check glucose daily 100 strip 1    Lancets (ONETOUCH DELICA PLUS [id number]) Does not apply Misc 1 lancet by Finger stick route daily. 100 each 0    Blood Glucose Monitoring Suppl (ONETOUCH ULTRA 2) w/Device Does not apply Kit Check glucose daily 1 kit 0    Glucose Blood (ONETOUCH ULTRA BLUE) In Vitro Strip Check glucose daily 100 strip 1    Glucose Blood (ACCU-CHEK NAM PLUS) In Vitro Strip       Blood Glucose Monitoring Suppl (ACCU-CHEK NAM PLUS) w/Device Does not apply Kit       aspirin 81 MG Oral Tab Take 1 tablet (81 mg total) by mouth daily.  (Patient not taking: Reported on 2023)       Allergies:  Losartan                Coughing    Comment:Tolerated in     Past Medical History:   Diagnosis Date    Colon polyp     repeat CLN in 5 years    Diabetes ([facility] [address]     Diabetes mellitus ([address]     Essential hypertension     High blood pressure     High cholesterol     Hyperlipidemia     Strabismus     OD XT      Past Surgical History:   Procedure Laterality Date    CATARACT      CATARACT EXTRACTION W/  INTRAOCULAR LENS IMPLANT Bilateral     Dr. Dorrie Apgar    COLONOSCOPY N/A 3/2/2022    Procedure: COLONOSCOPY;  Surgeon: Estefania Lopez MD;  Location: [address] [place] [address] ENDOSCOPY    OTHER SURGICAL HISTORY      strabismus surgery    STRABISMUS SURGERY Bilateral     VASECTOMY        Family History   Problem Relation Age of Onset    Hypertension Mother     Diabetes Mother     Diabetes Brother     Hypertension Brother     Glaucoma Neg       Social History:   Social History     Socioeconomic History    Marital status: Single   Tobacco Use    Smoking status: Never     Passive exposure: Never    Smokeless tobacco: Never   Vaping Use    Vaping Use: Never used   Substance and Sexual Activity Alcohol use: Yes     Comment: social use only. Drug use: No   Other Topics Concern    Pt has a pacemaker No    Pt has a defibrillator No    Reaction to local anesthetic No   Social Determinants of Health  Financial Resource Strain: Low Risk  (8/8/2023)      Financial Resource Strain          Difficulty of Paying Living Expenses: Not very hard          Med Affordability: No  Transportation Needs: No Transportation Needs (8/8/2023)      Transportation Needs          Lack of Transportation: No     Objective:   Physical Exam  Constitutional:       General: He is not in acute distress. Appearance: He is not ill-appearing, toxic-appearing or diaphoretic. HENT:      Right Ear: External ear normal.      Left Ear: External ear normal.   Eyes:      General: No scleral icterus. Right eye: No discharge. Left eye: No discharge. Cardiovascular:      Rate and Rhythm: Normal rate and regular rhythm. Pulses: Normal pulses. Heart sounds: Normal heart sounds. No murmur heard. Pulmonary:      Effort: Pulmonary effort is normal. No respiratory distress. Breath sounds: Normal breath sounds. No wheezing or rales. Abdominal:      General: Abdomen is flat. Bowel sounds are normal. There is no distension. Palpations: Abdomen is soft. Tenderness: There is no abdominal tenderness. There is no guarding. Musculoskeletal:      Cervical back: No edema, erythema, signs of trauma, rigidity or tenderness. No pain with movement. Right lower leg: No edema. Left lower leg: No edema. Comments: Right upper ext not edematous or swollen   Lymphadenopathy:      Cervical: No cervical adenopathy. Skin:     Coloration: Skin is not jaundiced. Neurological:      Mental Status: He is alert.          Assessment & Plan:   (I10) Essential hypertension  (primary encounter diagnosis)  Plan: I explained to the patient reason why he was taken off hydrochlorothiazide since already has end-stage renal disease and on dialysis so this medicine will not be even of any benefit or will work-up. His blood pressure is in good range right now on plain losartan 100 mg daily.    (M54.2) Neck pain  Plan: I discussed with the patient my concern about the possibility of thrombosis affecting his dialysis catheter and I told he needs to go to ER to have this checked out right away. Patient agreed and will go to [facility] ER now.    (E11.21) Controlled type 2 diabetes mellitus with diabetic nephropathy, without long-term current use of insulin ([address]  Plan: HEMOGLOBIN A1C        He has been off any diabetic medication since he was started on dialysis. He said that his blood glucose levels have been in good range at home. We did repeat his A1c and is 5.5 so practically a nondiabetic range. He will continue to monitor his sugars at home.             Orders Placed This Encounter      POC Glycohemoglobin [[id number]]      Meds This Visit:  Requested Prescriptions      No prescriptions requested or ordered in this encounter       Imaging & Referrals:  None

## 2023-09-11 ENCOUNTER — TELEPHONE (OUTPATIENT)
Dept: INTERNAL MEDICINE CLINIC | Facility: CLINIC | Age: 58
End: 2023-09-11

## 2023-09-11 NOTE — TELEPHONE ENCOUNTER
Spoke to [employer], Pharmacist at [facility] (name and  of patient verified). She is calling for clarification about prescription for losartan as it is listed as an allergy. Allergy details below reviewed, verbalizes understanding and states she will fill prescription. Allergy Details:  Review status set to Review Complete by Shey Caputo MD on 9/10/2023   Severity Reactions Comments   Losartan Low Coughing Tolerated in      Per progress note by Dr. Deanna Kendall 23:  ([place]) Essential hypertension  (primary encounter diagnosis)  Plan: I explained to the patient reason why he was taken off hydrochlorothiazide since already has end-stage renal disease and on dialysis so this medicine will not be even of any benefit or will work-up. His blood pressure is in good range right now on plain losartan 100 mg daily.

## 2023-09-11 NOTE — TELEPHONE ENCOUNTER
Pt is requesting refill for the following medication        metoprolol succinate ER 25 MG Oral Tablet 24 Hr, Take 1 tablet (25 mg total) by mouth Daily Beta Blocker. , Disp: 90 tablet, Rfl: 0

## 2023-09-11 NOTE — CM/SW NOTE
Called Elsie and spoke to [place] and she states that this patient is scheduled for this afternoon for HD.

## 2023-09-11 NOTE — TELEPHONE ENCOUNTER
Called pt to clarify, med record indicated sent 8/30/23 to mail order. Per pt he received medication today from pharmacy. No further action, closing encounter.

## 2023-09-18 ENCOUNTER — TELEPHONE (OUTPATIENT)
Dept: SURGERY | Facility: CLINIC | Age: 58
End: 2023-09-18

## 2023-09-18 ENCOUNTER — OFFICE VISIT (OUTPATIENT)
Dept: SURGERY | Facility: CLINIC | Age: 58
End: 2023-09-18

## 2023-09-18 ENCOUNTER — OFFICE VISIT (OUTPATIENT)
Dept: NEPHROLOGY | Facility: CLINIC | Age: 58
End: 2023-09-18

## 2023-09-18 VITALS
HEART RATE: 86 BPM | WEIGHT: 196 LBS | DIASTOLIC BLOOD PRESSURE: 74 MMHG | BODY MASS INDEX: 31 KG/M2 | SYSTOLIC BLOOD PRESSURE: 135 MMHG

## 2023-09-18 DIAGNOSIS — N52.8 OTHER MALE ERECTILE DYSFUNCTION: Primary | ICD-10-CM

## 2023-09-18 DIAGNOSIS — I82.621 ACUTE DEEP VEIN THROMBOSIS (DVT) OF OTHER VEIN OF RIGHT UPPER EXTREMITY (HCC): Primary | ICD-10-CM

## 2023-09-18 DIAGNOSIS — R31.29 MICROHEMATURIA: ICD-10-CM

## 2023-09-18 DIAGNOSIS — I82.621 ACUTE DEEP VEIN THROMBOSIS (DVT) OF RIGHT UPPER EXTREMITY, UNSPECIFIED VEIN (HCC): ICD-10-CM

## 2023-09-18 DIAGNOSIS — N18.6 ESRD ON HEMODIALYSIS (HCC): ICD-10-CM

## 2023-09-18 DIAGNOSIS — Z99.2 ESRD ON HEMODIALYSIS (HCC): ICD-10-CM

## 2023-09-18 PROCEDURE — 3075F SYST BP GE 130 - 139MM HG: CPT | Performed by: INTERNAL MEDICINE

## 2023-09-18 PROCEDURE — 3078F DIAST BP <80 MM HG: CPT | Performed by: INTERNAL MEDICINE

## 2023-09-18 PROCEDURE — 99214 OFFICE O/P EST MOD 30 MIN: CPT | Performed by: INTERNAL MEDICINE

## 2023-09-18 PROCEDURE — 99214 OFFICE O/P EST MOD 30 MIN: CPT | Performed by: UROLOGY

## 2023-09-18 NOTE — TELEPHONE ENCOUNTER
Sasha Dominguez,  This patient with end-stage renal disease needs to have a CT urogram for microhematuria. I wanted to see if it can be timed to be done on the day he gets dialyzed to remove contrast load.

## 2023-09-19 ENCOUNTER — TELEPHONE (OUTPATIENT)
Dept: SURGERY | Facility: CLINIC | Age: 58
End: 2023-09-19

## 2023-09-19 ENCOUNTER — OFFICE VISIT (OUTPATIENT)
Dept: SURGERY | Facility: CLINIC | Age: 58
End: 2023-09-19

## 2023-09-19 VITALS — WEIGHT: 196 LBS | BODY MASS INDEX: 31 KG/M2

## 2023-09-19 DIAGNOSIS — N18.6 END STAGE RENAL DISEASE (HCC): Primary | ICD-10-CM

## 2023-09-19 PROCEDURE — 99203 OFFICE O/P NEW LOW 30 MIN: CPT | Performed by: SURGERY

## 2023-09-19 NOTE — TELEPHONE ENCOUNTER
His dialysis treatments are Monday, Wednesday and Friday but does not start dialysis until around 3 PM.  So you can do the CT urogram on any of those days in the morning

## 2023-09-19 NOTE — TELEPHONE ENCOUNTER
New script for Trimix 10-1-30 PGE1 10 mcg phentolamine 1 mg, papervine 30 mg per ml. Per  patient to continue 0.2 ml-0.5 ml, script faxed to [facility].

## 2023-09-19 NOTE — PROGRESS NOTES
09/19/23        Patient: Orlando Rowell   YOB: 1965   Date of Visit: 9/18/2023       Dear  Dr. Kiara Ramirez MD,      Thank you for referring Orlando Rowell to my practice. Please find my assessment and plan below. As you know he is a 44-year-old male with a history of adult onset diabetes mellitus, hypertension and end-stage renal disease recently started on chronic hemodialysis who is now here for follow-up. Overall patient states he is doing much better without any chest pain, shortness of breath, GI or urinary tract symptoms. Energy and appetite are much better. Patient though was sent to the ER September 8, 2023 because of some discomfort along the right side of his neck where he has a right internal jugular tunneled hemodialysis catheter. A venous Doppler study was performed the right upper extremity and unfortunately showed acute occlusive thrombosis within the mid to lower right IJ vein. The patient was started on apixaban. States the discomfort that he previous felt is gone. Notes no swelling and blood flows are good at hemodialysis. No chest pain or shortness of breath. On physical exam his blood pressure 135/74 with a pulse of 86 and a weight 196 pounds. His neck was supple without JVD. The right IJ catheter is in place. There is no tenderness or swelling in this area. Exit site looks okay. Lungs were clear. Heart revealed a regular rate and rhythm with an S4 but no gallops, murmurs or rubs. Abdomen was soft, flat, nontender without again a megaly, masses or bruits. Extremities revealed no edema. Patient overall has improved significantly after starting hemodialysis. Patient though is now interested in transitioning to peritoneal dialysis. He is scheduled to see Dr. Bibiana White tomorrow to discuss placement. The only concern is that he is now on apixaban. We will repeat a Doppler venous ultrasound of his right upper extremity first week in October.   We will see if the DVT has resolved. If not we may need to admit the patient and bridge him in order to safely place the peritoneal dialysis catheter. Thank you again for allowing me to participate in the care of your patient. If you have any questions please feel free to call.            Sincerely,   Maria R Portillo MD   [facility], [address] [place] [address]  [address] [address]    Document electronically generated by:  Maria R Portillo MD

## 2023-09-19 NOTE — TELEPHONE ENCOUNTER
I Called pt and informed him of the msg as stated below from [facility], [place] and pt verbalized understanding and compliance.

## 2023-09-19 NOTE — TELEPHONE ENCOUNTER
Noted.  Thanks Leodan Disla. Urology staff please contact the patient and let him know to obtain a CT urogram midday on either Monday Wednesday or Friday so he can be dialyzed shortly after the CT scan is done.

## 2023-09-20 NOTE — TELEPHONE ENCOUNTER
Please review. Protocol failed / No Protocol. Medication never prescribed by clinic  Requested Prescriptions   Pending Prescriptions Disp Refills    sevelamer carbonate 800 MG Oral Tab  0     Sig: Take 1 tablet (800 mg total) by mouth 3 (three) times daily with meals.        There is no refill protocol information for this order

## 2023-09-21 PROBLEM — N18.6 END STAGE RENAL DISEASE (HCC): Status: ACTIVE | Noted: 2023-08-02

## 2023-09-21 RX ORDER — SEVELAMER CARBONATE 800 MG/1
800 TABLET, FILM COATED ORAL
Refills: 0 | OUTPATIENT
Start: 2023-09-21

## 2023-09-21 NOTE — PAT NURSING NOTE
S/w Eugene Rodas from Dr Hussain [place]  [facility] re: s/w pt and voice he don't have ride home after surgery. Pt plans to go to HD post surgery. Pt was told that he still needs a ride with a responsible adult after HD. And pt aware he can't take taxi  or uber. Eugene Rodas will inform MD and will call pt and will update PAT.

## 2023-09-22 ENCOUNTER — HOSPITAL ENCOUNTER (OUTPATIENT)
Facility: HOSPITAL | Age: 58
Setting detail: HOSPITAL OUTPATIENT SURGERY
Discharge: HOME | End: 2023-09-22
Attending: SURGERY | Admitting: SURGERY
Payer: COMMERCIAL

## 2023-09-22 ENCOUNTER — ANESTHESIA (OUTPATIENT)
Dept: SURGERY | Facility: HOSPITAL | Age: 58
End: 2023-09-22
Payer: COMMERCIAL

## 2023-09-22 ENCOUNTER — ANESTHESIA EVENT (OUTPATIENT)
Dept: SURGERY | Facility: HOSPITAL | Age: 58
End: 2023-09-22
Payer: COMMERCIAL

## 2023-09-22 VITALS
TEMPERATURE: 98 F | HEART RATE: 65 BPM | OXYGEN SATURATION: 97 % | WEIGHT: 187 LBS | DIASTOLIC BLOOD PRESSURE: 89 MMHG | RESPIRATION RATE: 12 BRPM | HEIGHT: 68 IN | BODY MASS INDEX: 28.34 KG/M2 | SYSTOLIC BLOOD PRESSURE: 155 MMHG

## 2023-09-22 LAB
GLUCOSE BLDC GLUCOMTR-MCNC: 108 MG/DL (ref 70–99)
POTASSIUM SERPL-SCNC: 4.3 MMOL/L (ref 3.5–5.1)

## 2023-09-22 PROCEDURE — 0WHG43Z INSERTION OF INFUSION DEVICE INTO PERITONEAL CAVITY, PERCUTANEOUS ENDOSCOPIC APPROACH: ICD-10-PCS | Performed by: SURGERY

## 2023-09-22 PROCEDURE — 49324 LAP INSERT TUNNEL IP CATH: CPT | Performed by: SURGERY

## 2023-09-22 RX ORDER — NICOTINE POLACRILEX 4 MG
30 LOZENGE BUCCAL
Status: DISCONTINUED | OUTPATIENT
Start: 2023-09-22 | End: 2023-09-22 | Stop reason: HOSPADM

## 2023-09-22 RX ORDER — ROCURONIUM BROMIDE 10 MG/ML
INJECTION, SOLUTION INTRAVENOUS AS NEEDED
Status: DISCONTINUED | OUTPATIENT
Start: 2023-09-22 | End: 2023-09-22 | Stop reason: SURG

## 2023-09-22 RX ORDER — NICOTINE POLACRILEX 4 MG
15 LOZENGE BUCCAL
Status: DISCONTINUED | OUTPATIENT
Start: 2023-09-22 | End: 2023-09-22

## 2023-09-22 RX ORDER — ACETAMINOPHEN AND CODEINE PHOSPHATE 300; 30 MG/1; MG/1
1-2 TABLET ORAL EVERY 4 HOURS PRN
Qty: 10 TABLET | Refills: 0 | Status: SHIPPED | OUTPATIENT
Start: 2023-09-22

## 2023-09-22 RX ORDER — MORPHINE SULFATE 4 MG/ML
4 INJECTION, SOLUTION INTRAMUSCULAR; INTRAVENOUS EVERY 10 MIN PRN
Status: DISCONTINUED | OUTPATIENT
Start: 2023-09-22 | End: 2023-09-22

## 2023-09-22 RX ORDER — ONDANSETRON 2 MG/ML
INJECTION INTRAMUSCULAR; INTRAVENOUS AS NEEDED
Status: DISCONTINUED | OUTPATIENT
Start: 2023-09-22 | End: 2023-09-22 | Stop reason: SURG

## 2023-09-22 RX ORDER — ACETAMINOPHEN 500 MG
1000 TABLET ORAL ONCE
Status: COMPLETED | OUTPATIENT
Start: 2023-09-22 | End: 2023-09-22

## 2023-09-22 RX ORDER — LIDOCAINE HYDROCHLORIDE 10 MG/ML
INJECTION, SOLUTION EPIDURAL; INFILTRATION; INTRACAUDAL; PERINEURAL AS NEEDED
Status: DISCONTINUED | OUTPATIENT
Start: 2023-09-22 | End: 2023-09-22 | Stop reason: SURG

## 2023-09-22 RX ORDER — NICOTINE POLACRILEX 4 MG
15 LOZENGE BUCCAL
Status: DISCONTINUED | OUTPATIENT
Start: 2023-09-22 | End: 2023-09-22 | Stop reason: HOSPADM

## 2023-09-22 RX ORDER — BUPIVACAINE HYDROCHLORIDE 5 MG/ML
INJECTION, SOLUTION EPIDURAL; INTRACAUDAL AS NEEDED
Status: DISCONTINUED | OUTPATIENT
Start: 2023-09-22 | End: 2023-09-22 | Stop reason: HOSPADM

## 2023-09-22 RX ORDER — METOPROLOL TARTRATE 5 MG/5ML
2.5 INJECTION INTRAVENOUS ONCE
Status: DISCONTINUED | OUTPATIENT
Start: 2023-09-22 | End: 2023-09-22

## 2023-09-22 RX ORDER — NICOTINE POLACRILEX 4 MG
30 LOZENGE BUCCAL
Status: DISCONTINUED | OUTPATIENT
Start: 2023-09-22 | End: 2023-09-22

## 2023-09-22 RX ORDER — NALOXONE HYDROCHLORIDE 0.4 MG/ML
80 INJECTION, SOLUTION INTRAMUSCULAR; INTRAVENOUS; SUBCUTANEOUS AS NEEDED
Status: DISCONTINUED | OUTPATIENT
Start: 2023-09-22 | End: 2023-09-22

## 2023-09-22 RX ORDER — HYDROMORPHONE HYDROCHLORIDE 1 MG/ML
0.4 INJECTION, SOLUTION INTRAMUSCULAR; INTRAVENOUS; SUBCUTANEOUS EVERY 5 MIN PRN
Status: DISCONTINUED | OUTPATIENT
Start: 2023-09-22 | End: 2023-09-22

## 2023-09-22 RX ORDER — HYDROMORPHONE HYDROCHLORIDE 1 MG/ML
0.6 INJECTION, SOLUTION INTRAMUSCULAR; INTRAVENOUS; SUBCUTANEOUS EVERY 5 MIN PRN
Status: DISCONTINUED | OUTPATIENT
Start: 2023-09-22 | End: 2023-09-22

## 2023-09-22 RX ORDER — POLYETHYLENE GLYCOL 3350 17 G/17G
17 POWDER, FOR SOLUTION ORAL DAILY
Qty: 14 PACKET | Refills: 0 | Status: SHIPPED | OUTPATIENT
Start: 2023-09-22 | End: 2023-10-06

## 2023-09-22 RX ORDER — MORPHINE SULFATE 10 MG/ML
6 INJECTION, SOLUTION INTRAMUSCULAR; INTRAVENOUS EVERY 10 MIN PRN
Status: DISCONTINUED | OUTPATIENT
Start: 2023-09-22 | End: 2023-09-22

## 2023-09-22 RX ORDER — CEFAZOLIN SODIUM/WATER 2 G/20 ML
2 SYRINGE (ML) INTRAVENOUS ONCE
Status: COMPLETED | OUTPATIENT
Start: 2023-09-22 | End: 2023-09-22

## 2023-09-22 RX ORDER — HYDROMORPHONE HYDROCHLORIDE 1 MG/ML
0.2 INJECTION, SOLUTION INTRAMUSCULAR; INTRAVENOUS; SUBCUTANEOUS EVERY 5 MIN PRN
Status: DISCONTINUED | OUTPATIENT
Start: 2023-09-22 | End: 2023-09-22

## 2023-09-22 RX ORDER — MORPHINE SULFATE 4 MG/ML
2 INJECTION, SOLUTION INTRAMUSCULAR; INTRAVENOUS EVERY 10 MIN PRN
Status: DISCONTINUED | OUTPATIENT
Start: 2023-09-22 | End: 2023-09-22

## 2023-09-22 RX ORDER — DEXTROSE MONOHYDRATE 25 G/50ML
50 INJECTION, SOLUTION INTRAVENOUS
Status: DISCONTINUED | OUTPATIENT
Start: 2023-09-22 | End: 2023-09-22 | Stop reason: HOSPADM

## 2023-09-22 RX ORDER — SODIUM CHLORIDE, SODIUM LACTATE, POTASSIUM CHLORIDE, CALCIUM CHLORIDE 600; 310; 30; 20 MG/100ML; MG/100ML; MG/100ML; MG/100ML
INJECTION, SOLUTION INTRAVENOUS CONTINUOUS
Status: DISCONTINUED | OUTPATIENT
Start: 2023-09-22 | End: 2023-09-22

## 2023-09-22 RX ORDER — DEXTROSE MONOHYDRATE 25 G/50ML
50 INJECTION, SOLUTION INTRAVENOUS
Status: DISCONTINUED | OUTPATIENT
Start: 2023-09-22 | End: 2023-09-22

## 2023-09-22 RX ORDER — SODIUM CHLORIDE 9 MG/ML
INJECTION, SOLUTION INTRAVENOUS CONTINUOUS
Status: DISCONTINUED | OUTPATIENT
Start: 2023-09-22 | End: 2023-09-22

## 2023-09-22 RX ADMIN — ONDANSETRON 4 MG: 2 INJECTION INTRAMUSCULAR; INTRAVENOUS at 11:32:00

## 2023-09-22 RX ADMIN — CEFAZOLIN SODIUM/WATER 2 G: 2 G/20 ML SYRINGE (ML) INTRAVENOUS at 10:58:00

## 2023-09-22 RX ADMIN — ROCURONIUM BROMIDE 30 MG: 10 INJECTION, SOLUTION INTRAVENOUS at 10:52:00

## 2023-09-22 RX ADMIN — SODIUM CHLORIDE: 9 INJECTION, SOLUTION INTRAVENOUS at 10:49:00

## 2023-09-22 RX ADMIN — SODIUM CHLORIDE: 9 INJECTION, SOLUTION INTRAVENOUS at 11:32:00

## 2023-09-22 RX ADMIN — LIDOCAINE HYDROCHLORIDE 25 MG: 10 INJECTION, SOLUTION EPIDURAL; INFILTRATION; INTRACAUDAL; PERINEURAL at 10:52:00

## 2023-09-22 NOTE — ANESTHESIA PROCEDURE NOTES
Airway  Date/Time: 9/22/2023 10:56 AM  Urgency: Elective    Airway not difficult    General Information and Staff    Patient location during procedure: OR  Anesthesiologist: Namita Olson MD  Resident/CRNA: Anabela Knutson CRNA  Performed: CRNA   Performed by: Anabela Knutson CRNA  Authorized by: Namita Olson MD      Indications and Patient Condition  Indications for airway management: anesthesia  Sedation level: deep  Preoxygenated: yes  Patient position: sniffing  Mask difficulty assessment: 1 - vent by mask    Final Airway Details  Final airway type: endotracheal airway      Successful airway: ETT  Cuffed: yes   Successful intubation technique: direct laryngoscopy  Facilitating devices/methods: cricoid pressure and intubating stylet  Endotracheal tube insertion site: oral  Blade: Melanie  Blade size: #4  ETT size (mm): 7.5    Cormack-Lehane Classification: grade IIA - partial view of glottis  Placement verified by: capnometry   Measured from: lips  ETT to lips (cm): 22  Number of attempts at approach: 1    Additional Comments  Atraumatic intubation on first attempt, teeth and soft tissues appear in preop condition.

## 2023-09-22 NOTE — INTERVAL H&P NOTE
Pre-op Diagnosis: End stage renal disease ([facility] [address] [N18.6]    The above referenced H&P was reviewed by Barbara Castillo MD on 9/22/2023, the patient was examined and no significant changes have occurred in the patient's condition since the H&P was performed. I discussed with the patient and/or legal representative the potential benefits, risks and side effects of this procedure; the likelihood of the patient achieving goals; and potential problems that might occur during recuperation. I discussed reasonable alternatives to the procedure, including risks, benefits and side effects related to the alternatives and risks related to not receiving this procedure. We will proceed with procedure as planned.

## 2023-09-22 NOTE — INTERVAL H&P NOTE
Pre-op Diagnosis: End stage renal disease ([facility] [address] [N18.6]    The above referenced H&P was reviewed by Serena Shafer MD on 9/22/2023, the patient was examined and no significant changes have occurred in the patient's condition since the H&P was performed. I discussed with the patient and/or legal representative the potential benefits, risks and side effects of this procedure; the likelihood of the patient achieving goals; and potential problems that might occur during recuperation. I discussed reasonable alternatives to the procedure, including risks, benefits and side effects related to the alternatives and risks related to not receiving this procedure. We will proceed with procedure as planned. Dr. Maritza Goldstein recommendation noted. Pt is outside of the risk of upper extremity DVT clot propagation. No repeat US of upper extremity necessary prior to PD catheter placement. Pt has held eliquis for two days and can resume eliquis tomorrow. Ok to proceed with lap PD catheter placement today.

## 2023-09-22 NOTE — OPERATIVE REPORT
[facility]     Operative Report    Patient Name:  Tammi Blake  MR:  [id number]  :  1965  DOS:  23    Preop Dx:  End stage renal disease ([address] [N18.6]  Postop Dx:  End stage renal disease ([address] [N18.6], Intra-abdominal adhesions    Procedure:    Laparoscopic peritoneal dialysis catheter placement  Laparoscopic lysis of adhesions  Surgeon:  Mckinley Fernandez MD  Surgical Assistant.: Ranulfo Hinojosa CSA, Tamica Parra MD  EBL: 2 ml  Complication:  None    INDICATION:  Pt is a 56year old male who with End stage renal disease ([address] [N18.6] who is scheduled for a Laparoscopic peritoneal dialysis catheter insertion. CONSENT:  An informed consent discussion was held with the patient regarding the peritoneal dialysis and the details of the procedure. The risks including but not limited to bleeding, wound infection, intra-abdominal infection, injury to the colon, small intestine, catheter malfunction and incisional hernia were discussed. The patient expressed understanding and want to proceed with the planned procedure. TECHNIQUE:  The patient was taken to the OR and placed in supine position. General anesthesia was established and the abdomen was prepped in standard fashion. Pneumoperitoneum was obtained using Veress needle technique through a supra-umbilical incision. A 5-mm trocar was inserted under direct visualization and no injury occurred. Examination of the abdomen showed omental adhesions to the infra umbilical area. Lysis of adhesions would be necessary to free the omentum from the anterior abdominal wall to allow placement of the omentum in the lower abdomen. Two 5-mm trocars were placed in the right mid and upper abdominal area. Adhesiolysis was performed using hook cautery to free the omentum completely from the anterior abdominal wall. Once adhesiolysis was complete, we placed an 8-mm trocar in the right abdomen.   The 62 cm peritoneal dialysis catheter was introduced into the peritoneum through the 8-mm trocar. The inner cuff was placed just outside the peritoneum and the outer cuff was placed within the subcutaneous tissue. The PD catheter was tested with saline and brisk flow was seen in both directions. The PD catheter was secured to the skin using 2-0 nylon. The trocars were removed and no trocar site bleeding was seen. The skin incisions were closed using 4-0 vicryl. Sterile dressings were applied. All instrument and sponge counts were correct. I was present during the critical portions of the procedure.     Garry Callahan MD

## 2023-09-25 ENCOUNTER — PATIENT MESSAGE (OUTPATIENT)
Dept: SURGERY | Facility: CLINIC | Age: 58
End: 2023-09-25

## 2023-09-26 ENCOUNTER — TELEPHONE (OUTPATIENT)
Dept: SURGERY | Facility: CLINIC | Age: 58
End: 2023-09-26

## 2023-09-26 NOTE — TELEPHONE ENCOUNTER
Patient still awaiting script for rx Trimix, see closed te on 9/19. Patient tried contact [facility]/pharm and they were not able to assist. Patient calling for update and/or asking if another pharmacy can be used that will process quicker. Please call at [phone].

## 2023-10-05 ENCOUNTER — TELEPHONE (OUTPATIENT)
Dept: CASE MANAGEMENT | Age: 58
End: 2023-10-05

## 2023-10-05 ENCOUNTER — PATIENT MESSAGE (OUTPATIENT)
Dept: CASE MANAGEMENT | Age: 58
End: 2023-10-05

## 2023-10-05 NOTE — TELEPHONE ENCOUNTER
CT Urogram        Status: DENIED        Reference number [id number]     A copy of the denial letter is filed under the MEDIA tab, reference for complete details. You may reach out to [address] [place] [address] at [phone] to discuss decision. Please reach out to patient with plan of care.       Thank you

## 2023-10-06 ENCOUNTER — HOSPITAL ENCOUNTER (OUTPATIENT)
Dept: ULTRASOUND IMAGING | Age: 58
Discharge: HOME | End: 2023-10-06
Attending: INTERNAL MEDICINE
Payer: COMMERCIAL

## 2023-10-06 ENCOUNTER — HOSPITAL ENCOUNTER (OUTPATIENT)
Dept: CT IMAGING | Age: 58
End: 2023-10-06
Attending: UROLOGY
Payer: COMMERCIAL

## 2023-10-06 DIAGNOSIS — I82.621 ACUTE DEEP VEIN THROMBOSIS (DVT) OF OTHER VEIN OF RIGHT UPPER EXTREMITY (HCC): ICD-10-CM

## 2023-10-06 PROCEDURE — 93971 EXTREMITY STUDY: CPT | Performed by: INTERNAL MEDICINE

## 2023-10-08 ENCOUNTER — TELEPHONE (OUTPATIENT)
Dept: NEPHROLOGY | Facility: CLINIC | Age: 58
End: 2023-10-08

## 2023-10-08 NOTE — TELEPHONE ENCOUNTER
The ultrasound shows that the clot in the vein is smaller but not completely gone. He should be starting training for peritoneal dialysis. Let me know when he is ready to start peritoneal dialysis on his own. Will then arrange to have removal of the hemodialysis catheter. For now continue Eliquis 5 mg twice daily.

## 2023-10-24 ENCOUNTER — TELEPHONE (OUTPATIENT)
Dept: NEPHROLOGY | Facility: CLINIC | Age: 58
End: 2023-10-24

## 2023-10-26 ENCOUNTER — PATIENT MESSAGE (OUTPATIENT)
Dept: CASE MANAGEMENT | Age: 58
End: 2023-10-26

## 2023-11-13 ENCOUNTER — TELEPHONE (OUTPATIENT)
Dept: NEPHROLOGY | Facility: CLINIC | Age: 58
End: 2023-11-13

## 2023-11-13 NOTE — TELEPHONE ENCOUNTER
I have no idea what his CTC catheter is. If they mean his tunneled hemodialysis catheter okay to remove. Send to IR.

## 2023-11-13 NOTE — TELEPHONE ENCOUNTER
Fax received from [employer] for short term disability. Emailed and sent copy of fax to Forms Dept via inter-office mail.

## 2023-11-13 NOTE — TELEPHONE ENCOUNTER
Dr. Keyur Hui from [address] renal states patient is stable on his PD at home. Asking for order for CTC catheter to be removed.

## 2023-11-14 NOTE — TELEPHONE ENCOUNTER
Dr Avelino Jay confirmed with patient is taking Apixaban 5mg twice daily  Please sign pending order if appropriate,thanks.

## 2023-12-01 NOTE — TELEPHONE ENCOUNTER
Ice from [employer] is following up on this please follow up  # [phone]  reference #[id number]

## 2023-12-05 NOTE — TELEPHONE ENCOUNTER
Spoke to pt, details obtained. Pt states provider is aware of LTD.     Type of Leave:  LTD   Reason for Leave:  Chronic kidney disease  Start date of leave:  11/1/23  How much time needed?:  long term  Forms Due Date:  Not given  Was Fee and Turnaround info Given?:  Yes, 10-15 business day turn around time

## 2023-12-07 NOTE — TELEPHONE ENCOUNTER
Dr. Jayson Esteves,      *The ACKNOWLEDGE button has been moved to the top right ribbon*    Please sign off on form if you agree to: LTD 11/01/2023  (place your signature on the first page only)    -From your Inbasket, Highlight the patient and click Chart   -Double click the 52/61/2918 Forms Completion telephone encounter  -Scroll down to the Media section   -Click the blue Hyperlink: DEEPIKA Palomares 38/23/2262   -Click Acknowledge located in the top right ribbon/menu   -Drag the mouse into the blank space of the document and a + sign will appear. Left click to   electronically sign the document.      Thank you,  Soni Khan.

## 2023-12-08 VITALS — WEIGHT: 195 LBS | HEIGHT: 68 IN | BODY MASS INDEX: 29.55 KG/M2

## 2023-12-08 RX ORDER — METOPROLOL TARTRATE 100 MG/1
100 TABLET ORAL DAILY
COMMUNITY
Start: 2023-12-05

## 2023-12-08 NOTE — TELEPHONE ENCOUNTER
LTD forms faxed to [facility] @ [phone] as requested, sent MyChart to pt to advises, archived forms

## 2023-12-14 ENCOUNTER — HOSPITAL ENCOUNTER (OUTPATIENT)
Dept: INTERVENTIONAL RADIOLOGY/VASCULAR | Facility: HOSPITAL | Age: 58
Discharge: HOME | End: 2023-12-14
Attending: INTERNAL MEDICINE
Payer: COMMERCIAL

## 2023-12-19 ENCOUNTER — HOSPITAL ENCOUNTER (OUTPATIENT)
Dept: INTERVENTIONAL RADIOLOGY/VASCULAR | Facility: HOSPITAL | Age: 58
Discharge: HOME | End: 2023-12-19
Attending: INTERNAL MEDICINE | Admitting: STUDENT IN AN ORGANIZED HEALTH CARE EDUCATION/TRAINING PROGRAM
Payer: COMMERCIAL

## 2023-12-19 VITALS
TEMPERATURE: 98 F | SYSTOLIC BLOOD PRESSURE: 144 MMHG | WEIGHT: 195 LBS | HEART RATE: 69 BPM | OXYGEN SATURATION: 98 % | DIASTOLIC BLOOD PRESSURE: 104 MMHG | HEIGHT: 68 IN | BODY MASS INDEX: 29.55 KG/M2

## 2023-12-19 DIAGNOSIS — N18.6 ESRD (END STAGE RENAL DISEASE) (HCC): ICD-10-CM

## 2023-12-19 PROCEDURE — 36589 REMOVAL TUNNELED CV CATH: CPT | Performed by: RADIOLOGY

## 2023-12-19 PROCEDURE — 05PYX3Z REMOVAL OF INFUSION DEVICE FROM UPPER VEIN, EXTERNAL APPROACH: ICD-10-PCS | Performed by: RADIOLOGY

## 2023-12-19 NOTE — DISCHARGE INSTRUCTIONS
Pr-14  4.2  [phone]     Patient Name:  Jae Pastor    Procedure:  Port a cath Removal by Dr. Chamorro [facility]    Site Care: Dermabond (skin glue) has been applied to your incision. Do not scrub the area. Allow the Dermabond to flake off on its own. Activity/Diet  No heavy lifting or strenuous activity for 48 hours. Do not shower for 48 hours. Do not submerge site for 1 week (no baths/swimming pools)  Drink plenty of fluids, unless you have otherwise been told to restrict your fluid intake. Medications: Take acetaminophen if needed for pain. Do not exceed 4000mg of acetaminophen in a 24-hour period. , Do not take blood thinners, aspirin, or Plavix for 24 hours. , and Make no changes to your existing medications. Contact Interventional Radiology at [phone] if you have severe/unrelieved pain, fever, chills, dizziness/lightheadedness, or drainage/bleeding from your incision site.

## 2023-12-19 NOTE — IVS NOTE
DISCHARGE NOTE     Pt is able to sit up and ambulate without difficulty. Procedural site remains dry and intact with good circulation, motion, and sensation. No signs and symptoms of bleeding/hematoma noted. Instruction provided, patient/family verbalizes understanding. Dr. Warren Hylton spoke with patient/family post procedure.      Pt ambulated to main entrance for discharge

## 2023-12-19 NOTE — IVS NOTE
Patient in cath lab holding room 8. Spoke with Karlee Munoz MD about procedure and had all questions answered. Consent obtained. Time-out performed, all staff and patient agreeable. 10 mL lidocaine used for local anesthetic. No sedation. Catheter removed fully intact. Pressure held for 1 minute, no bleeding or hematoma at site. Dermabond for dressing. Patient tolerated procedure well. States no pain.  Report given to cath lab holding lisa grijalva,

## 2023-12-20 NOTE — H&P
History & Physical Examination    Patient Name: Viraj Brooks  MRN: [id number]  CSN: [id number]  YOB: 1965    Diagnosis: ESRD    Present Illness: ESRD on PD, no longer in need of hemodialysis cath    No medications prior to admission. No current facility-administered medications for this encounter. Allergies: Allergies   Allergen Reactions    Losartan Coughing     Tolerated in 2023       Past Medical History:   Diagnosis Date    Blood disorder     Anemia    Colon polyp 2022    repeat CLN in 5 years    Diabetes ([address]     Diet control. Pt off from medications for 4 months now. Diabetes mellitus ([address]     Dialysis patient [facility])     Pt with temporary cath on R chest with HD started 8/2023 with HD q MWF. Essential hypertension     High blood pressure     High cholesterol     History of blood transfusion 08/02/2023    No blood transfusion reaction    Hyperlipidemia     Renal disorder     Strabismus     OD XT    Thrombus     Per pt had (+) clot on R IJ (temp HD cath)     Past Surgical History:   Procedure Laterality Date    CATARACT      CATARACT EXTRACTION W/  INTRAOCULAR LENS IMPLANT Bilateral 2017    Dr. David Weber    COLONOSCOPY N/A 3/2/2022    Procedure: COLONOSCOPY;  Surgeon: Holly Gonzales MD;  Location: [address] [place] [address] ENDOSCOPY    OTHER SURGICAL HISTORY      strabismus surgery    STRABISMUS SURGERY Bilateral 1995    VASECTOMY       Family History   Problem Relation Age of Onset    No Known Problems Father     Hypertension Mother     Diabetes Mother     Diabetes Brother     Hypertension Brother     Glaucoma Neg      Social History     Tobacco Use    Smoking status: Never     Passive exposure: Never    Smokeless tobacco: Never   Substance Use Topics    Alcohol use: Yes     Comment: social use only.        SYSTEM Check if Review is Normal Check if Physical Exam is Normal If not normal, please explain:   HEENT [x ] [x ]    NECK & BACK [x ] [x ] R tunnelled Permcath   HEART [x ] [x ]    LUNGS [x ] [ x]    ABDOMEN [x ] [ x] PD cath   UROGENITAL [x ] [x ]    EXTREMITIES [x ] [ x]    OTHER        [ x ] I have discussed the risks and benefits and alternatives with the patient/family. They understand and agree to proceed with plan of care. [ x ] I have reviewed the History and Physical done within the last 30 days. Any changes noted above.     Juliana Levy MD  12/20/2023  1:54 PM

## 2023-12-22 RX ORDER — ACETAMINOPHEN AND CODEINE PHOSPHATE 300; 30 MG/1; MG/1
1-2 TABLET ORAL EVERY 4 HOURS PRN
Qty: 10 TABLET | Refills: 0 | OUTPATIENT
Start: 2023-12-22

## 2024-02-01 ENCOUNTER — TELEPHONE (OUTPATIENT)
Dept: TRANSPLANT | Age: 59
End: 2024-02-01

## 2024-02-01 DIAGNOSIS — N18.6 ESRD (END STAGE RENAL DISEASE) (CMD): Primary | ICD-10-CM

## 2024-02-14 ENCOUNTER — TELEPHONE (OUTPATIENT)
Dept: NEPHROLOGY | Facility: CLINIC | Age: 59
End: 2024-02-14

## 2024-02-14 NOTE — TELEPHONE ENCOUNTER
Current Outpatient Medications   Medication Sig Dispense Refill     Sevelamer HCI 800mg tab  Qty 270    Not on med list-   exceeds plan limitations

## 2024-02-22 ENCOUNTER — APPOINTMENT (OUTPATIENT)
Dept: TRANSPLANT | Age: 59
End: 2024-02-22
Attending: INTERNAL MEDICINE

## 2024-02-22 ENCOUNTER — APPOINTMENT (OUTPATIENT)
Dept: TRANSPLANT | Age: 59
End: 2024-02-22

## 2024-03-01 ENCOUNTER — TELEPHONE (OUTPATIENT)
Dept: TRANSPLANT | Age: 59
End: 2024-03-01

## 2024-03-12 ENCOUNTER — TELEPHONE (OUTPATIENT)
Dept: TRANSPLANT | Age: 59
End: 2024-03-12

## 2024-03-26 ENCOUNTER — TELEPHONE (OUTPATIENT)
Dept: TRANSPLANT | Age: 59
End: 2024-03-26

## 2024-05-07 ENCOUNTER — TELEPHONE (OUTPATIENT)
Dept: NEPHROLOGY | Facility: CLINIC | Age: 59
End: 2024-05-07

## 2024-05-15 ENCOUNTER — APPOINTMENT (OUTPATIENT)
Dept: GENERAL RADIOLOGY | Facility: HOSPITAL | Age: 59
DRG: 907 | End: 2024-05-15
Attending: EMERGENCY MEDICINE

## 2024-05-15 ENCOUNTER — HOSPITAL ENCOUNTER (INPATIENT)
Facility: HOSPITAL | Age: 59
LOS: 7 days | Discharge: HOME | DRG: 907 | End: 2024-05-22
Attending: EMERGENCY MEDICINE

## 2024-05-15 ENCOUNTER — HOSPITAL ENCOUNTER (INPATIENT)
Facility: HOSPITAL | Age: 59
LOS: 7 days | Discharge: HOME | DRG: 907 | End: 2024-05-22
Attending: EMERGENCY MEDICINE | Admitting: HOSPITALIST

## 2024-05-15 DIAGNOSIS — Z99.2 PERITONEAL DIALYSIS CATHETER IN PLACE (HCC): ICD-10-CM

## 2024-05-15 DIAGNOSIS — R10.9 ABDOMINAL PAIN, ACUTE: Primary | ICD-10-CM

## 2024-05-15 PROBLEM — K65.2 SBP (SPONTANEOUS BACTERIAL PERITONITIS) (HCC): Status: ACTIVE | Noted: 2024-05-15

## 2024-05-15 PROBLEM — N18.6 ESRD (END STAGE RENAL DISEASE) ON DIALYSIS (HCC): Status: ACTIVE | Noted: 2024-05-15

## 2024-05-15 PROBLEM — N25.81 SECONDARY HYPERPARATHYROIDISM (HCC): Status: ACTIVE | Noted: 2024-05-15

## 2024-05-15 LAB
ALBUMIN SERPL-MCNC: 4 G/DL (ref 3.2–4.8)
ALP LIVER SERPL-CCNC: 119 U/L
ALT SERPL-CCNC: 54 U/L
AMYLASE PRT-CCNC: <20 U/L
ANION GAP SERPL CALC-SCNC: 8 MMOL/L (ref 0–18)
AST SERPL-CCNC: 45 U/L (ref ?–34)
BASOPHILS # BLD AUTO: 0.01 X10(3) UL (ref 0–0.2)
BASOPHILS NFR BLD AUTO: 0.1 %
BASOPHILS NFR PRT: 1 %
BILIRUB DIRECT SERPL-MCNC: 0.2 MG/DL (ref ?–0.3)
BILIRUB SERPL-MCNC: 0.5 MG/DL (ref 0.3–1.2)
BUN BLD-MCNC: 32 MG/DL (ref 9–23)
BUN/CREAT SERPL: 3.3 (ref 10–20)
CALCIUM BLD-MCNC: 8.5 MG/DL (ref 8.7–10.4)
CHLORIDE SERPL-SCNC: 92 MMOL/L (ref 98–112)
CO2 SERPL-SCNC: 30 MMOL/L (ref 21–32)
COLOR FLD: COLORLESS
CREAT BLD-MCNC: 9.62 MG/DL
DEPRECATED RDW RBC AUTO: 43.8 FL (ref 35.1–46.3)
EGFRCR SERPLBLD CKD-EPI 2021: 6 ML/MIN/1.73M2 (ref 60–?)
EOSINOPHIL # BLD AUTO: 0.04 X10(3) UL (ref 0–0.7)
EOSINOPHIL NFR BLD AUTO: 0.4 %
EOSINOPHIL NFR PRT: 0 %
ERYTHROCYTE [DISTWIDTH] IN BLOOD BY AUTOMATED COUNT: 13.2 % (ref 11–15)
EST. AVERAGE GLUCOSE BLD GHB EST-MCNC: 151 MG/DL (ref 68–126)
FLUAV + FLUBV RNA SPEC NAA+PROBE: NEGATIVE
FLUAV + FLUBV RNA SPEC NAA+PROBE: NEGATIVE
GLUCOSE BLD-MCNC: 189 MG/DL (ref 70–99)
GLUCOSE BLDC GLUCOMTR-MCNC: 139 MG/DL (ref 70–99)
GLUCOSE BLDC GLUCOMTR-MCNC: 167 MG/DL (ref 70–99)
GLUCOSE PRT-MCNC: 172 MG/DL
GLUCOSE PRT-MCNC: 175 MG/DL
HBA1C MFR BLD: 6.9 % (ref ?–5.7)
HCT VFR BLD AUTO: 30.4 %
HGB BLD-MCNC: 10.8 G/DL
IMM GRANULOCYTES # BLD AUTO: 0.06 X10(3) UL (ref 0–1)
IMM GRANULOCYTES NFR BLD: 0.6 %
LACTATE SERPL-SCNC: 1.2 MMOL/L (ref 0.5–2)
LDH FLD L TO P-CCNC: 21 U/L
LYMPHOCYTES # BLD AUTO: 0.87 X10(3) UL (ref 1–4)
LYMPHOCYTES NFR BLD AUTO: 9 %
LYMPHOCYTES NFR PRT: 0 %
MCH RBC QN AUTO: 32 PG (ref 26–34)
MCHC RBC AUTO-ENTMCNC: 35.5 G/DL (ref 31–37)
MCV RBC AUTO: 90.2 FL
MONOCYTES # BLD AUTO: 0.71 X10(3) UL (ref 0.1–1)
MONOCYTES NFR BLD AUTO: 7.3 %
MONOS+MACROS NFR PRT: 8 %
NEUTROPHILS # BLD AUTO: 8.02 X10 (3) UL (ref 1.5–7.7)
NEUTROPHILS # BLD AUTO: 8.02 X10(3) UL (ref 1.5–7.7)
NEUTROPHILS NFR BLD AUTO: 82.6 %
NEUTROPHILS NFR FLD: 91 %
OSMOLALITY SERPL CALC.SUM OF ELEC: 282 MOSM/KG (ref 275–295)
PHOSPHATE SERPL-MCNC: 5 MG/DL (ref 2.4–5.1)
PLATELET # BLD AUTO: 425 10(3)UL (ref 150–450)
POTASSIUM SERPL-SCNC: 2.7 MMOL/L (ref 3.5–5.1)
PROT PRT-MCNC: <2 G/DL
PROT SERPL-MCNC: 7.7 G/DL (ref 5.7–8.2)
RBC # BLD AUTO: 3.37 X10(6)UL
RBC # FLD: <1 /CUMM (ref ?–1)
RSV RNA SPEC NAA+PROBE: NEGATIVE
SARS-COV-2 RNA RESP QL NAA+PROBE: NOT DETECTED
SODIUM SERPL-SCNC: 130 MMOL/L (ref 136–145)
TOTAL CELLS COUNTED FLD: 100
TOTAL CELLS COUNTED PRT: 2722 /CUMM (ref ?–1)
WBC # BLD AUTO: 9.7 X10(3) UL (ref 4–11)
WBC # PRT: 2722 /CUMM

## 2024-05-15 PROCEDURE — 99255 IP/OBS CONSLTJ NEW/EST HI 80: CPT | Performed by: INTERNAL MEDICINE

## 2024-05-15 PROCEDURE — 3E1M39Z IRRIGATION OF PERITONEAL CAVITY USING DIALYSATE, PERCUTANEOUS APPROACH: ICD-10-PCS | Performed by: INTERNAL MEDICINE

## 2024-05-15 PROCEDURE — 71045 X-RAY EXAM CHEST 1 VIEW: CPT | Performed by: EMERGENCY MEDICINE

## 2024-05-15 PROCEDURE — 99223 1ST HOSP IP/OBS HIGH 75: CPT | Performed by: HOSPITALIST

## 2024-05-15 RX ORDER — SEVELAMER CARBONATE 800 MG/1
2400 TABLET, FILM COATED ORAL
COMMUNITY

## 2024-05-15 RX ORDER — DEXTROSE MONOHYDRATE, SODIUM CHLORIDE, SODIUM LACTATE, CALCIUM CHLORIDE, MAGNESIUM CHLORIDE 1.5; 538; 448; 18.4; 5.08 G/100ML; MG/100ML; MG/100ML; MG/100ML; MG/100ML
5000 SOLUTION INTRAPERITONEAL
Status: DISCONTINUED | OUTPATIENT
Start: 2024-05-15 | End: 2024-05-22

## 2024-05-15 RX ORDER — ONDANSETRON 2 MG/ML
4 INJECTION INTRAMUSCULAR; INTRAVENOUS EVERY 6 HOURS PRN
Status: DISCONTINUED | OUTPATIENT
Start: 2024-05-15 | End: 2024-05-22

## 2024-05-15 RX ORDER — ACETAMINOPHEN 325 MG/1
650 TABLET ORAL EVERY 4 HOURS PRN
Status: DISCONTINUED | OUTPATIENT
Start: 2024-05-15 | End: 2024-05-22

## 2024-05-15 RX ORDER — HYDROCODONE BITARTRATE AND ACETAMINOPHEN 5; 325 MG/1; MG/1
2 TABLET ORAL EVERY 4 HOURS PRN
Status: DISCONTINUED | OUTPATIENT
Start: 2024-05-15 | End: 2024-05-22

## 2024-05-15 RX ORDER — VANCOMYCIN 2 GRAM/500 ML IN 0.9 % SODIUM CHLORIDE INTRAVENOUS
25 ONCE
Status: COMPLETED | OUTPATIENT
Start: 2024-05-15 | End: 2024-05-15

## 2024-05-15 RX ORDER — BENZONATATE 100 MG/1
100 CAPSULE ORAL 3 TIMES DAILY PRN
Status: DISCONTINUED | OUTPATIENT
Start: 2024-05-15 | End: 2024-05-22

## 2024-05-15 RX ORDER — NICOTINE POLACRILEX 4 MG
30 LOZENGE BUCCAL
Status: DISCONTINUED | OUTPATIENT
Start: 2024-05-15 | End: 2024-05-22

## 2024-05-15 RX ORDER — SEVELAMER CARBONATE 800 MG/1
2400 TABLET, FILM COATED ORAL
Status: DISCONTINUED | OUTPATIENT
Start: 2024-05-15 | End: 2024-05-22

## 2024-05-15 RX ORDER — HYDROMORPHONE HYDROCHLORIDE 1 MG/ML
0.2 INJECTION, SOLUTION INTRAMUSCULAR; INTRAVENOUS; SUBCUTANEOUS EVERY 2 HOUR PRN
Status: DISCONTINUED | OUTPATIENT
Start: 2024-05-15 | End: 2024-05-22

## 2024-05-15 RX ORDER — HYDROMORPHONE HYDROCHLORIDE 1 MG/ML
0.8 INJECTION, SOLUTION INTRAMUSCULAR; INTRAVENOUS; SUBCUTANEOUS EVERY 2 HOUR PRN
Status: DISCONTINUED | OUTPATIENT
Start: 2024-05-15 | End: 2024-05-22

## 2024-05-15 RX ORDER — HYDROCODONE BITARTRATE AND ACETAMINOPHEN 5; 325 MG/1; MG/1
1 TABLET ORAL EVERY 4 HOURS PRN
Status: DISCONTINUED | OUTPATIENT
Start: 2024-05-15 | End: 2024-05-22

## 2024-05-15 RX ORDER — NICOTINE POLACRILEX 4 MG
15 LOZENGE BUCCAL
Status: DISCONTINUED | OUTPATIENT
Start: 2024-05-15 | End: 2024-05-22

## 2024-05-15 RX ORDER — AMLODIPINE BESYLATE 10 MG/1
10 TABLET ORAL EVERY MORNING
Status: DISCONTINUED | OUTPATIENT
Start: 2024-05-15 | End: 2024-05-22

## 2024-05-15 RX ORDER — ACETAMINOPHEN 500 MG
500 TABLET ORAL EVERY 4 HOURS PRN
Status: DISCONTINUED | OUTPATIENT
Start: 2024-05-15 | End: 2024-05-22

## 2024-05-15 RX ORDER — POTASSIUM CHLORIDE 20 MEQ/1
40 TABLET, EXTENDED RELEASE ORAL ONCE
Status: COMPLETED | OUTPATIENT
Start: 2024-05-15 | End: 2024-05-15

## 2024-05-15 RX ORDER — DEXTROSE MONOHYDRATE 25 G/50ML
50 INJECTION, SOLUTION INTRAVENOUS
Status: DISCONTINUED | OUTPATIENT
Start: 2024-05-15 | End: 2024-05-22

## 2024-05-15 RX ORDER — METOPROLOL SUCCINATE 100 MG/1
100 TABLET, EXTENDED RELEASE ORAL DAILY
Status: DISCONTINUED | OUTPATIENT
Start: 2024-05-15 | End: 2024-05-22

## 2024-05-15 RX ORDER — ATORVASTATIN CALCIUM 40 MG/1
80 TABLET, FILM COATED ORAL NIGHTLY
Status: DISCONTINUED | OUTPATIENT
Start: 2024-05-15 | End: 2024-05-22

## 2024-05-15 RX ORDER — HYDROMORPHONE HYDROCHLORIDE 1 MG/ML
0.4 INJECTION, SOLUTION INTRAMUSCULAR; INTRAVENOUS; SUBCUTANEOUS EVERY 2 HOUR PRN
Status: DISCONTINUED | OUTPATIENT
Start: 2024-05-15 | End: 2024-05-22

## 2024-05-15 RX ORDER — PROCHLORPERAZINE EDISYLATE 5 MG/ML
5 INJECTION INTRAMUSCULAR; INTRAVENOUS EVERY 8 HOURS PRN
Status: DISCONTINUED | OUTPATIENT
Start: 2024-05-15 | End: 2024-05-22

## 2024-05-15 RX ORDER — TEMAZEPAM 7.5 MG/1
15 CAPSULE ORAL NIGHTLY PRN
Status: DISCONTINUED | OUTPATIENT
Start: 2024-05-15 | End: 2024-05-22

## 2024-05-15 RX ORDER — METOPROLOL SUCCINATE 25 MG/1
25 TABLET, EXTENDED RELEASE ORAL DAILY
Status: DISCONTINUED | OUTPATIENT
Start: 2024-05-15 | End: 2024-05-22

## 2024-05-15 NOTE — PLAN OF CARE
Problem: Patient Centered Care  Goal: Patient preferences are identified and integrated in the patient's plan of care  Description: Interventions:  - What would you like us to know as we care for you? From home alone  - Provide timely, complete, and accurate information to patient/family  - Incorporate patient and family knowledge, values, beliefs, and cultural backgrounds into the planning and delivery of care  - Encourage patient/family to participate in care and decision-making at the level they choose  - Honor patient and family perspectives and choices  Outcome: Progressing     Problem: Diabetes/Glucose Control  Goal: Glucose maintained within prescribed range  Description: INTERVENTIONS:  - Monitor Blood Glucose as ordered  - Assess for signs and symptoms of hyperglycemia and hypoglycemia  - Administer ordered medications to maintain glucose within target range  - Assess barriers to adequate nutritional intake and initiate nutrition consult as needed  - Instruct patient on self management of diabetes  Outcome: Progressing     Problem: Patient/Family Goals  Goal: Patient/Family Long Term Goal  Description: Patient's Long Term Goal: determine source of pain    Interventions:  - imaging, microbiology  - See additional Care Plan goals for specific interventions  Outcome: Progressing  Goal: Patient/Family Short Term Goal  Description: Patient's Short Term Goal stop cough    Interventions:   - meds  - See additional Care Plan goals for specific interventions  Outcome: Progressing

## 2024-05-15 NOTE — PROGRESS NOTES
[facility] Dosing Service      Initial Pharmacokinetic Consult for Vancomycin Dosing     Gorge Alejandre is a 58 year old male who is being initiated on vancomycin therapy for intra-abdominal infection.  Pharmacy has been asked to dose vancomycin by MD Garza.  The initial treatment and monitoring approach will be non-AUC strategy.        Weight and Temperature:    Wt Readings from Last 1 Encounters:   05/15/24 83 kg (183 lb)        Temp Readings from Last 1 Encounters:   05/15/24 99 °F (37.2 °C) (Oral)      Labs:   Recent Labs   Lab 05/15/24  1110   CREATSERUM 9.62*      Estimated Creatinine Clearance: 8.3 mL/min (A) (based on SCr of 9.62 mg/dL (H)).     Recent Labs   Lab 05/15/24  1110   WBC 9.7          The Pharmacokinetic Target is:    Trough/random 10-15 mg/L    Renal Dosing Considerations:  PD HD     Assessment/Plan:   Initial/Loading dose: Has received 2000 mg IV (25 mg/kg, capped at 2250 mg) x 1 loading dose.      Maintenance dose: Pharmacy will dose vancomycin per levels    Monitorin) Plan for vancomycin random level to be obtained in approximately 48 hours    2) Pharmacy will order SCr as clinically indicated to assess renal function.    3) Pharmacy will monitor for toxicity and efficacy, adjust vancomycin dose and/or frequency, and order vancomycin levels as appropriate per the Pharmacy and Therapeutics Committee approved protocol until discontinuation of the medication.       We appreciate the opportunity to assist in the care of this patient.     Janae Forrest, MacrinaD  5/15/2024  2:18 PM  [facility]  Pharmacy Extension: [phone]

## 2024-05-15 NOTE — CONSULTS
[facility]  part of [facility]    Report of Consultation    Date of Admission:  5/15/2024  Date of Consult:  5/15/2024   Reason for Consultation:     ESRD on PD, peritonitis    History of Present Illness:   Patient is a 58 year old male with past medical history of ESRD on peritoneal dialysis, ESRD secondary to diabetic nephropathy, DM, hypertension, hyperlipidemia who presented for abdominal pain and peritoneal fluid being cloudy    Patient complain of having abdominal pain for almost 4 weeks-attributed to gastroenteritis.  Stated does not check his fluid for cloudiness every day.  Noticed to have fluid cloudy since yesterday.  Was seen outpatient by PD nurse and was sent to ED.      Past Medical History  Past Medical History:    Blood disorder    Anemia    Colon polyp    repeat CLN in 5 years    Diabetes (HCC)    Diet control. Pt off from medications for 4 months now.    Diabetes mellitus (HCC)    Dialysis patient (HCC)    Pt with temporary cath on R chest with HD started 8/2023 with HD q MWF.    Essential hypertension    High blood pressure    High cholesterol    History of blood transfusion    No blood transfusion reaction    Hyperlipidemia    Renal disorder    Strabismus    OD XT    Thrombus    Per pt had (+) clot on R IJ (temp HD cath)       Past Surgical History  Past Surgical History:   Procedure Laterality Date    Cataract      Cataract extraction w/  intraocular lens implant Bilateral 2017    Dr. OZZY Coleman    Colonoscopy N/A 3/2/2022    Procedure: COLONOSCOPY;  Surgeon: ALLAN Gaffney MD;  Location: [facility] ENDOSCOPY    Other surgical history      strabismus surgery    Strabismus surgery Bilateral 1995    Vasectomy         Family History  Family History   Problem Relation Age of Onset    No Known Problems Father     Hypertension Mother     Diabetes Mother     Diabetes Brother     Hypertension Brother     Glaucoma Neg        Social History  Pediatric History   Patient Parents    Not on  file     Other Topics Concern    [place] up [place] a farm Not Asked    History of tanning Not Asked    Outdoor occupation Not Asked    Pt has a pacemaker No    Pt has a defibrillator No    Reaction to local anesthetic No   Social History Narrative    Not on file           Current Medications:  Current Facility-Administered Medications   Medication Dose Route Frequency    [START ON 5/16/2024] ceFEPIme (Maxipime) 1 g in sodium chloride 0.9% 100 mL IVPB-MBP  1 g Intravenous Q24H    glucose (Dex4) 15 GM/59ML oral liquid 15 g  15 g Oral Q15 Min PRN    Or    glucose (Glutose) 40% oral gel 15 g  15 g Oral Q15 Min PRN    Or    glucose-vitamin C (Dex-4) chewable tab 4 tablet  4 tablet Oral Q15 Min PRN    Or    dextrose 50% injection 50 mL  50 mL Intravenous Q15 Min PRN    Or    glucose (Dex4) 15 GM/59ML oral liquid 30 g  30 g Oral Q15 Min PRN    Or    glucose (Glutose) 40% oral gel 30 g  30 g Oral Q15 Min PRN    Or    glucose-vitamin C (Dex-4) chewable tab 8 tablet  8 tablet Oral Q15 Min PRN    insulin aspart (NovoLOG) 100 Units/mL FlexPen 1-7 Units  1-7 Units Subcutaneous TID CC    acetaminophen (Tylenol Extra Strength) tab 500 mg  500 mg Oral Q4H PRN    ondansetron (Zofran) 4 MG/2ML injection 4 mg  4 mg Intravenous Q6H PRN    prochlorperazine (Compazine) 10 MG/2ML injection 5 mg  5 mg Intravenous Q8H PRN    HYDROmorphone (Dilaudid) 1 MG/ML injection 0.2 mg  0.2 mg Intravenous Q2H PRN    Or    HYDROmorphone (Dilaudid) 1 MG/ML injection 0.4 mg  0.4 mg Intravenous Q2H PRN    Or    HYDROmorphone (Dilaudid) 1 MG/ML injection 0.8 mg  0.8 mg Intravenous Q2H PRN    acetaminophen (Tylenol) tab 650 mg  650 mg Oral Q4H PRN    Or    HYDROcodone-acetaminophen (Norco) 5-325 MG per tab 1 tablet  1 tablet Oral Q4H PRN    Or    HYDROcodone-acetaminophen (Norco) 5-325 MG per tab 2 tablet  2 tablet Oral Q4H PRN    temazepam (Restoril) cap 15 mg  15 mg Oral Nightly PRN    Vancomycin: PHARMACY DOSING  1 each Intravenous See Admin Instructions (RX  holding)    guaiFENesin (Robitussin) 100 MG/5 ML oral liquid 200 mg  200 mg Oral Q4H PRN    apixaban (Eliquis) tab 5 mg  5 mg Oral BID    amLODIPine (Norvasc) tab 10 mg  10 mg Oral QAM    atorvastatin (Lipitor) tab 80 mg  80 mg Oral Nightly    metoprolol succinate ER (Toprol XL) 24 hr tab 100 mg  100 mg Oral Daily    metoprolol succinate ER (Toprol XL) 24 hr tab 25 mg  25 mg Oral Daily    sevelamer carbonate (Renvela) tab 2,400 mg  2,400 mg Oral TID CC       Allergies  Allergies   Allergen Reactions    Losartan Coughing     Tolerated in 2023       Review of Systems:   Constitutional: negative for fatigue, fevers and weight loss  Eyes: negative for irritation, redness and visual disturbance  Ears, nose, mouth, throat, and face: negative for hearing loss and sore throat  Respiratory: negative for cough, hemoptysis and wheezing  Cardiovascular: negative for chest pain, exertional dyspnea, lower extremity edema and palpitations  Gastrointestinal: Positive abdominal pain  Genitourinary: Urinate only once a day  Hematologic/lymphatic: negative for bleeding and easy bruising  Musculoskeletal:negative for back pain, bone pain and muscle weakness  Neurological: negative for gait problems, memory problems and seizures  Behavioral/Psych: negative for anxiety and depression    Physical Exam:   Height: --  Weight: 183 lb (83 kg) (05/15 1010)  BSA (Calculated - sq m): --  Pulse: 89 (05/15 1405)  BP: 150/92 (05/15 1405)  Temp: 99 °F (37.2 °C) (05/15 1405)  Do Not Use - Resp Rate: --  SpO2: 98 % (05/15 1405)  Temp:  [98.4 °F (36.9 °C)-99 °F (37.2 °C)] 99 °F (37.2 °C)  Pulse:  [88-95] 89  Resp:  [16-18] 18  BP: (115-161)/(83-97) 150/92  SpO2:  [95 %-100 %] 98 %  SpO2: 98 %     Intake/Output Summary (Last 24 hours) at 5/15/2024 1456  Last data filed at 5/15/2024 1245  Gross per 24 hour   Intake 100 ml   Output --   Net 100 ml     Wt Readings from Last 5 Encounters:   05/15/24 183 lb (83 kg)   12/12/23 195 lb (88.5 kg)   12/08/23 195  lb (88.5 kg)   09/22/23 187 lb (84.8 kg)   09/19/23 196 lb (88.9 kg)       General appearance: alert, appears stated age and cooperative  Head: Normocephalic, atraumatic  Eyes: conjunctivae/corneas clear  Throat: lips, mucosa, and tongue normal; teeth and gums normal  Neck:  no JVD, supple  Abdominal: soft, positive tenderness  Extremities: extremities normal, no edema  Skin: No rashes or lesions  Neurologic: Grossly normal  Psychiatric: calm    Results:     Laboratory Data:  Recent Labs   Lab 05/15/24  1110   RBC 3.37*   HGB 10.8*   HCT 30.4*   MCV 90.2   NEPRELIM 8.02*   WBC 9.7   .0     Recent Labs   Lab 05/15/24  1110   *   BUN 32*   CREATSERUM 9.62*   CA 8.5*   ALB 4.0   *   K 2.7*   CL 92*   CO2 30.0   ALKPHO 119*   AST 45*   ALT 54*   BILT 0.5   TP 7.7     No results found for: \"PTT\", \"INR\"  No results for input(s): \"BNP\" in the last 168 hours.       Impression:       Patient is a 58 year old male with past medical history of ESRD on peritoneal dialysis, ESRD secondary to diabetic nephropathy, DM, hypertension, hyperlipidemia who presented for abdominal pain and peritoneal fluid being cloudy    1.ESRD: Transitioned from HD to PD  On PD for close to 6 months  Will continue cycler overnight-9 hours with 2.5 L fill volume and 1.5% dextrose  Check serum phosphorus  Anemia secondary to chronic kidney disease-[facility] outpatient.  Hemoglobin at goal    2. Peritonitis:  Cell count elevated at 2700  IV vancomycin and cefepime  Peritoneal fluid and blood cultures pending  Will repeat the cell count again on Friday    3.secondary hyperparathyroidism: Check serum Phos  Continue sevelamer 3 tabs 3 times daily with meals    4.hypertension: Continue metoprolol and amlodipine    Discussed with nursing-discussed with dialysis nurse as well        Thank you for allowing me to participate in the care of your patient.    Joel Santacruz MD  5/15/2024

## 2024-05-15 NOTE — ED QUICK NOTES
Orders for admission, patient is aware of plan and ready to go upstairs. Any questions, please call ED RN Anibal at extension [phone].     Patient Covid vaccination status: Fully vaccinated     COVID Test Ordered in ED: SARS-CoV-2/Flu A and B/RSV by PCR (GeneXpert)    COVID Suspicion at Admission: N/A    Running Infusions:      Mental Status/LOC at time of transport: A&Ox4    Other pertinent information: Blood cultures pending. Peritoneal Dialysis    CIWA score: N/A   NIH score:  N/A

## 2024-05-15 NOTE — ED INITIAL ASSESSMENT (HPI)
Patient ambulatory to ED with complaint of abd pain /w swelling. Peritoneal dialysis at home.       Patient is AXOX4.

## 2024-05-15 NOTE — H&P
[facility]    PATIENT'S NAME: NIKKY CHU   ATTENDING PHYSICIAN: Jose Luis Santacruz MD   PATIENT ACCOUNT#:   [id number]    LOCATION:  [address]  MEDICAL RECORD #:   [id number]       YOB: 1965  ADMISSION DATE:       05/15/2024    HISTORY AND PHYSICAL EXAMINATION    CHIEF COMPLAINT:  Abdominal pain, possible underlying spontaneous bacterial peritonitis.    HISTORY OF PRESENT ILLNESS:  The patient is a 58-year-old  male currently undergoing peritoneal dialysis for end-stage renal disease.  He has been having progressive vague abdominal pain in the last 4 weeks and yesterday started noticing that his dialysis fluid is turbid in color.  Today he was evaluated by his nephrologist, Dr. Zane Lovell, and had fluid sample sent for analysis and culture.  Sent to the emergency department for evaluation.  CBC showed white blood cell count of 9.7, left shift on differential; hemoglobin 10.8, which is at baseline.  Chemistry showed potassium 2.7, sodium 130, GFR 6, AST and ALT 45 and 54, alkaline phosphatase 119.  Blood cultures were obtained.  GeneXpert viral panel was negative.  Fluid LDH is 21, total protein less than 2, glucose 175, amylase less than 20.  Lactic acid is still pending.  The patient was started empirically on IV cefepime and vancomycin.  He will be admitted to the hospital for further management.    PAST MEDICAL HISTORY:  End-stage renal disease, on peritoneal dialysis, secondary to diabetic nephropathy; underlying diabetes mellitus type 2; anemia of chronic kidney disease; hypertension; hyperlipidemia; right internal jugular DVT.    PAST SURGICAL HISTORY:  Peritoneal dialysis catheter, cataract procedure, and vasectomy.    MEDICATIONS:  Please see medication reconciliation list.    ALLERGIES:  Side effects to losartan, but no known drug allergies.    FAMILY HISTORY:  Positive for diabetes mellitus type 2 and hypertension.    SOCIAL HISTORY:  No tobacco,  alcohol, or drug use.  Lives with his family.  Independent in his basic activities of daily living.    REVIEW OF SYSTEMS:  The patient reports mid to left upper quadrant abdominal discomfort, initially intermittent and then constant for the last 2 weeks.  Low-grade fevers at home and yesterday started noticing darkening and turbidity in his dialysis fluid.  He denies any chest pain or shortness of breath.  Other 12-point review of systems negative.      PHYSICAL EXAMINATION:    GENERAL:  Alert.  Oriented to time, place, and person.  Mild to moderate distress.  VITAL SIGNS:  Temperature 98.4, pulse 89, respiratory rate 16, blood pressure 126/89, pulse ox 97% on room air.  HEENT:  Atraumatic.  Oropharynx clear.  Moist mucous membranes.  Normal hard and soft palate.  Eyes:  Anicteric sclerae.  NECK:  Supple.  No lymphadenopathy.  Trachea midline.  Full range of motion.  LUNGS:  Clear to auscultation bilaterally.  Normal respiratory effort.  HEART:  Regular rate and rhythm.  S1 and S2 auscultated.  No murmur.  ABDOMEN:  Soft.  Discomfort to palpation, but no tenderness.  Positive bowel sounds.   EXTREMITIES:  No peripheral edema, clubbing, or cyanosis.  NEUROLOGIC:  Motor and sensory intact.     ASSESSMENT AND PLAN:    1.   Clinical presentation suggestive of possible underlying spontaneous bacterial peritonitis.    2.   End-stage renal disease, on peritoneal dialysis.  3.   Diabetes mellitus type 2.      Patient will be admitted to general medical floor.  Empirically start IV cefepime and vancomycin.  Nephrology consult.  Await dialysis fluid culture.  Pain control.  Monitor Accu-Cheks.  Further recommendations to follow.     Dictated By Tawana Garza MD  d: 05/15/2024 12:04:49  t: 05/15/2024 13:06:33  Job [id number]  /

## 2024-05-15 NOTE — ED PROVIDER NOTES
Patient Seen in: [facility] Emergency Department    History     Chief Complaint   Patient presents with    Abdomen/Flank Pain       HPI    History is provided by patient/independent historian: patient  58 year old male with history of diabetes, peritoneal dialysis, hypertension, hyperlipidemia, here with complaints of abdominal pain for the past month.  He spiked a fever 1 week ago associated with a cough, but he went to the dialysis center where they took off some fluid to rule out infection and told him to bring it to the emergency department.  No associated nausea or vomiting or changes in bowel movements.    History reviewed.   Past Medical History:    Blood disorder    Anemia    Colon polyp    repeat CLN in 5 years    Diabetes (HCC)    Diet control. Pt off from medications for 4 months now.    Diabetes mellitus (HCC)    Dialysis patient (HCC)    Pt with temporary cath on R chest with HD started 8/2023 with HD q MWF.    Essential hypertension    High blood pressure    High cholesterol    History of blood transfusion    No blood transfusion reaction    Hyperlipidemia    Renal disorder    Strabismus    OD XT    Thrombus    Per pt had (+) clot on R IJ (temp HD cath)         History reviewed.   Past Surgical History:   Procedure Laterality Date    Cataract      Cataract extraction w/  intraocular lens implant Bilateral 2017    Dr. OZZY Coleman    Colonoscopy N/A 3/2/2022    Procedure: COLONOSCOPY;  Surgeon: ALLAN Gaffney MD;  Location: [facility] ENDOSCOPY    Other surgical history      strabismus surgery    Strabismus surgery Bilateral 1995    Vasectomy           Home Medications reviewed :  (Not in a hospital admission)        History reviewed.   Social History     Socioeconomic History    Marital status: Single   Tobacco Use    Smoking status: Never     Passive exposure: Never    Smokeless tobacco: Never   Vaping Use    Vaping status: Never Used   Substance and Sexual Activity    Alcohol use: Yes     Comment:  social use only.    Drug use: No   Other Topics Concern    Pt has a pacemaker No    Pt has a defibrillator No    Reaction to local anesthetic No         ROS  Review of Systems   Constitutional:  Positive for fatigue and fever.   Respiratory:  Positive for cough. Negative for shortness of breath.    Cardiovascular:  Negative for chest pain.   Gastrointestinal:  Positive for abdominal pain.   All other systems reviewed and are negative.     All other pertinent organ systems are reviewed and are negative.      Physical Exam     ED Triage Vitals [05/15/24 1010]   /83   Pulse 95   Resp 18   Temp 98.4 °F (36.9 °C)   Temp src Temporal   SpO2 100 %   O2 Device None (Room air)     Vital signs reviewed.      Physical Exam  Vitals and nursing note reviewed.   Cardiovascular:      Pulses: Normal pulses.   Pulmonary:      Effort: No respiratory distress.   Abdominal:      General: There is no distension.      Comments: Peritoneal dialysis catheter in place without surrounding cellulitis, mild left upper quadrant tenderness to palpation   Neurological:      Mental Status: He is alert.         ED Course       Labs:     Labs Reviewed   BASIC METABOLIC PANEL (8) - Abnormal; Notable for the following components:       Result Value    Glucose 189 (*)     Sodium 130 (*)     Potassium 2.7 (*)     Chloride 92 (*)     BUN 32 (*)     Creatinine 9.62 (*)     BUN/CREA Ratio 3.3 (*)     Calcium, Total 8.5 (*)     eGFR-Cr 6 (*)     All other components within normal limits   HEPATIC FUNCTION PANEL (7) - Abnormal; Notable for the following components:    AST 45 (*)     ALT 54 (*)     Alkaline Phosphatase 119 (*)     All other components within normal limits   CELL COUNT PERITONEAL FLUID - Abnormal; Notable for the following components:    TNC, Peritoneal Fluid 2,722 (*)     All other components within normal limits    Narrative:     Body Fluid Reference Range:                  WBC less than 1000/CUMM: Transudate                  WBC  greater than or equal to 1000/cumm: Exudate   CBC W/ DIFFERENTIAL - Abnormal; Notable for the following components:    RBC 3.37 (*)     HGB 10.8 (*)     HCT 30.4 (*)     Neutrophil Absolute Prelim 8.02 (*)     Neutrophil Absolute 8.02 (*)     Lymphocyte Absolute 0.87 (*)     All other components within normal limits   LACTIC ACID, PLASMA - Normal   SARS-COV-2/FLU A AND B/RSV BY PCR (GENEXPERT) - Normal    Narrative:     This test is intended for the qualitative detection and differentiation of SARS-CoV-2, influenza A, influenza B, and respiratory syncytial virus (RSV) viral RNA in nasopharyngeal or nares swabs from individuals suspected of respiratory viral infection consistent with COVID-19 by their healthcare provider. Signs and symptoms of respiratory viral infection due to SARS-CoV-2, influenza, and RSV can be similar.                                    Test performed using the Xpert Xpress SARS-CoV-2/FLU/RSV (real time RT-PCR)  assay on the [employer] instrument, [employer], [place] [address].                   This test is being used under the Food and Drug Administration's Emergency Use Authorization.                                    The authorized Fact Sheet for Healthcare Providers for this assay is available upon request from the laboratory.   CBC WITH DIFFERENTIAL WITH PLATELET    Narrative:     The following orders were created for panel order CBC With Differential With Platelet.                  Procedure                               Abnormality         Status                                     ---------                               -----------         ------                                     CBC W/ DIFFERENTIAL[[id number]]          Abnormal            Final result                                                 Please view results for these tests on the individual orders.   LDH PERITONEAL FLUID    Narrative:     A reference range has not been established for this body fluid type.                                        GLUCOSE PERITONEAL FLUID    Narrative:     A reference range has not been established for this body fluid type.                                       GLUCOSE PERITONEAL FLUID    Narrative:     A reference range has not been established for this body fluid type.                                       TOTAL PROTEIN PERITONEAL FLUID    Narrative:     A reference range has not been established for this body fluid type.                                       AMYLASE PERITONEAL FLUID    Narrative:     A reference range has not been established for this body fluid type.                                       CELL COUNT/DIFF PERITONEAL FL   RAINBOW DRAW LAVENDER   RAINBOW DRAW LIGHT GREEN   RAINBOW DRAW BLUE   RAINBOW DRAW GOLD   BODY FLUID CULT,AEROBIC AND ANAEROBIC   BLOOD CULTURE   BLOOD CULTURE         My EKG Interpretation:   As reviewed and Interpreted by me      Imaging Results Available and Reviewed while in ED:   XR CHEST AP PORTABLE  (CPT=71045)    Result Date: 5/15/2024  CONCLUSION:  No radiographic evidence of acute cardiopulmonary process.    Dictated by (CST): Liane Márquez MD on 5/15/2024 at 12:05 PM     Finalized by (CST): Liane Márquez MD on 5/15/2024 at 12:06 PM         My review and independent interpretation of CXR images: no infiltrate. Radiology report corroborates this in addition to other details as reported by them.      Decision rules/scores evaluated: none      Diagnostic labs/tests considered but not ordered: none    ED Medications Administered:   Medications   vancomycin (Vancocin) 2 g in sodium chloride 0.9% 500mL IVPB premix (has no administration in time range)   ceFEPIme (Maxpime) 2 g in sodium chloride 0.9% 100 mL IVPB-MBP (2 g Intravenous New Bag 5/15/24 1204)   potassium chloride (Klor-Con M20) tab 40 mEq (40 mEq Oral Given 5/15/24 1215)                MDM       Medical Decision Making      Differential Diagnosis: After obtaining the patient's history, performing  the physical exam and reviewing the diagnostics, multiple initial diagnoses were considered based on the presenting problem including SBP, pneumonia, viral syndrome, UTI, diverticulitis    External document review: I personally reviewed available external medical records for any recent pertinent discharge summaries, testing, and procedures - the findings are as follows: 4/18/24 visit with Dr. Lazaro for medical records for transplant    Complicating Factors: The patient already  has a past medical history of Blood disorder, Colon polyp (2022), Diabetes ([facility]), Diabetes mellitus ([facility]), Dialysis patient ([facility]), Essential hypertension, High blood pressure, High cholesterol, History of blood transfusion (08/02/2023), Hyperlipidemia, Renal disorder, Strabismus, and Thrombus. to contribute to the complexity of this ED evaluation.    Procedures performed: none    Discussed management with physician/appropriate source: Dr. Chuy Santacruz, Dr. Garza    Considered admission/deescalation of care for: none    Social determinants of health affecting patient care: none    Prescription medications considered: discussed continuing current medication regimen    The patient requires continuous monitoring for: abdominal pain, fever    Shared decision making: discussed possible admission        Disposition and Plan     Clinical Impression:  1. Abdominal pain, acute    2. Peritoneal dialysis catheter in place (HCC)        Disposition:  Admit    Follow-up:  No follow-up provider specified.    Medications Prescribed:  Current Discharge Medication List          Hospital Problems       Present on Admission  Date Reviewed: 9/21/2023            ICD-10-CM Noted POA    * (Principal) Abdominal pain, acute R10.9 5/15/2024 Unknown

## 2024-05-16 LAB
ANION GAP SERPL CALC-SCNC: 7 MMOL/L (ref 0–18)
BASOPHILS # BLD AUTO: 0.02 X10(3) UL (ref 0–0.2)
BASOPHILS NFR BLD AUTO: 0.3 %
BUN BLD-MCNC: 32 MG/DL (ref 9–23)
BUN/CREAT SERPL: 3.6 (ref 10–20)
CALCIUM BLD-MCNC: 7.8 MG/DL (ref 8.7–10.4)
CHLORIDE SERPL-SCNC: 93 MMOL/L (ref 98–112)
CO2 SERPL-SCNC: 30 MMOL/L (ref 21–32)
CREAT BLD-MCNC: 8.86 MG/DL
DEPRECATED RDW RBC AUTO: 43.2 FL (ref 35.1–46.3)
EGFRCR SERPLBLD CKD-EPI 2021: 6 ML/MIN/1.73M2 (ref 60–?)
EOSINOPHIL # BLD AUTO: 0.13 X10(3) UL (ref 0–0.7)
EOSINOPHIL NFR BLD AUTO: 1.9 %
ERYTHROCYTE [DISTWIDTH] IN BLOOD BY AUTOMATED COUNT: 13.2 % (ref 11–15)
GLUCOSE BLD-MCNC: 190 MG/DL (ref 70–99)
GLUCOSE BLDC GLUCOMTR-MCNC: 155 MG/DL (ref 70–99)
GLUCOSE BLDC GLUCOMTR-MCNC: 166 MG/DL (ref 70–99)
GLUCOSE BLDC GLUCOMTR-MCNC: 186 MG/DL (ref 70–99)
GLUCOSE BLDC GLUCOMTR-MCNC: 198 MG/DL (ref 70–99)
HCT VFR BLD AUTO: 26.2 %
HGB BLD-MCNC: 9.4 G/DL
IMM GRANULOCYTES # BLD AUTO: 0.06 X10(3) UL (ref 0–1)
IMM GRANULOCYTES NFR BLD: 0.9 %
LYMPHOCYTES # BLD AUTO: 0.8 X10(3) UL (ref 1–4)
LYMPHOCYTES NFR BLD AUTO: 11.7 %
MCH RBC QN AUTO: 32.6 PG (ref 26–34)
MCHC RBC AUTO-ENTMCNC: 35.9 G/DL (ref 31–37)
MCV RBC AUTO: 91 FL
MONOCYTES # BLD AUTO: 0.5 X10(3) UL (ref 0.1–1)
MONOCYTES NFR BLD AUTO: 7.3 %
NEUTROPHILS # BLD AUTO: 5.32 X10 (3) UL (ref 1.5–7.7)
NEUTROPHILS # BLD AUTO: 5.32 X10(3) UL (ref 1.5–7.7)
NEUTROPHILS NFR BLD AUTO: 77.9 %
OSMOLALITY SERPL CALC.SUM OF ELEC: 282 MOSM/KG (ref 275–295)
PLATELET # BLD AUTO: 398 10(3)UL (ref 150–450)
POTASSIUM SERPL-SCNC: 2.9 MMOL/L (ref 3.5–5.1)
RBC # BLD AUTO: 2.88 X10(6)UL
SODIUM SERPL-SCNC: 130 MMOL/L (ref 136–145)
WBC # BLD AUTO: 6.8 X10(3) UL (ref 4–11)

## 2024-05-16 PROCEDURE — 99233 SBSQ HOSP IP/OBS HIGH 50: CPT | Performed by: INTERNAL MEDICINE

## 2024-05-16 PROCEDURE — 99233 SBSQ HOSP IP/OBS HIGH 50: CPT | Performed by: HOSPITALIST

## 2024-05-16 RX ORDER — POTASSIUM CHLORIDE 1.5 G/1.58G
40 POWDER, FOR SOLUTION ORAL ONCE
Status: COMPLETED | OUTPATIENT
Start: 2024-05-16 | End: 2024-05-16

## 2024-05-16 RX ORDER — DEXTROSE MONOHYDRATE, SODIUM CHLORIDE, SODIUM LACTATE, CALCIUM CHLORIDE, MAGNESIUM CHLORIDE 1.5; 538; 448; 18.4; 5.08 G/100ML; MG/100ML; MG/100ML; MG/100ML; MG/100ML
5000 SOLUTION INTRAPERITONEAL
Status: DISCONTINUED | OUTPATIENT
Start: 2024-05-16 | End: 2024-05-22

## 2024-05-16 NOTE — PLAN OF CARE
Problem: Patient Centered Care  Goal: Patient preferences are identified and integrated in the patient's plan of care  Description: Interventions:  - What would you like us to know as we care for you? From home alone  - Provide timely, complete, and accurate information to patient/family  - Incorporate patient and family knowledge, values, beliefs, and cultural backgrounds into the planning and delivery of care  - Encourage patient/family to participate in care and decision-making at the level they choose  - Honor patient and family perspectives and choices  Outcome: Progressing     Problem: Diabetes/Glucose Control  Goal: Glucose maintained within prescribed range  Description: INTERVENTIONS:  - Monitor Blood Glucose as ordered  - Assess for signs and symptoms of hyperglycemia and hypoglycemia  - Administer ordered medications to maintain glucose within target range  - Assess barriers to adequate nutritional intake and initiate nutrition consult as needed  - Instruct patient on self management of diabetes  Outcome: Progressing     Problem: Patient/Family Goals  Goal: Patient/Family Long Term Goal  Description: Patient's Long Term Goal: determine source of pain    Interventions:  - imaging, microbiology  - See additional Care Plan goals for specific interventions  Outcome: Progressing  Goal: Patient/Family Short Term Goal  Description: Patient's Short Term Goal stop cough    Interventions:   - meds  - See additional Care Plan goals for specific interventions  Outcome: Progressing     Patient is presently resting in the bed. Alert x 4. Vital signs taken and stable. Patient is medicated as needed for pain or discomfort. Patient on regular, low phosphorus diet with 1500 ml fluid restrictions per day. Potassium level this am was 2.9. Potassium level was reported to Nephrologist, Dr. Chuy Santacruz. Nephrologist entered orders  for oral potassium replacement. Call light within reach at all times. Patient receives intravenous  antibiotics per order. Patient will received Peritoneal dialysis on today per order.

## 2024-05-16 NOTE — PLAN OF CARE
Phone call made to [facility] and spoke to Griselda at 2:17pm to arrange for Peritoneal Dialysis treatment for today, 5/16/2024 per Dr. Chuy mora.

## 2024-05-16 NOTE — PROGRESS NOTES
[facility]  part of [facility]    Progress Note      Subjective:       C/o abdominal pain. No NV. No urinary complain - urinates only once a day . No chills or fever     Review of Systems:     Constitutional: negative for fatigue, fevers and weight loss  Eyes: negative for irritation, redness and visual disturbance  Ears, nose, mouth, throat, and face: negative for hearing loss and sore throat  Respiratory: negative for cough, hemoptysis and wheezing  Cardiovascular: negative for chest pain, exertional dyspnea, lower extremity edema and palpitations  Gastrointestinal: Positive abdominal pain  Genitourinary: Urinate only once a day  Hematologic/lymphatic: negative for bleeding and easy bruising  Musculoskeletal:negative for back pain, bone pain and muscle weakness  Neurological: negative for gait problems, memory problems and seizures  Behavioral/Psych: negative for anxiety and depression       Objective:   Temp:  [99 °F (37.2 °C)-99.6 °F (37.6 °C)] 99.6 °F (37.6 °C)  Pulse:  [82-90] 87  Resp:  [16-18] 18  BP: (127-161)/(79-97) 128/79  SpO2:  [94 %-100 %] 94 %  SpO2: 94 %     Intake/Output Summary (Last 24 hours) at 5/16/2024 1153  Last data filed at 5/16/2024 0500  Gross per 24 hour   Intake 340 ml   Output -700 ml   Net 1040 ml     Wt Readings from Last 3 Encounters:   05/15/24 178 lb 9.6 oz (81 kg)   12/12/23 195 lb (88.5 kg)   12/08/23 195 lb (88.5 kg)     General appearance: alert, appears stated age and cooperative  Head: Normocephalic, atraumatic  Eyes: conjunctivae/corneas clear  Throat: lips, mucosa, and tongue normal; teeth and gums normal  Neck:  no JVD, supple  Abdominal: soft, positive tenderness  Extremities: extremities normal, no edema  Skin: No rashes or lesions  Neurologic: Grossly normal  Psychiatric: calm       Medications:  Current Facility-Administered Medications   Medication Dose Route Frequency    [START ON 5/17/2024] epoetin anuja (Epogen, Procrit) 5,000 Units injection   5,000 Units Subcutaneous Once per day on Monday Wednesday Friday    ceFEPIme (Maxipime) 1 g in sodium chloride 0.9% 100 mL IVPB-MBP  1 g Intravenous Q24H    glucose (Dex4) 15 GM/59ML oral liquid 15 g  15 g Oral Q15 Min PRN    Or    glucose (Glutose) 40% oral gel 15 g  15 g Oral Q15 Min PRN    Or    glucose-vitamin C (Dex-4) chewable tab 4 tablet  4 tablet Oral Q15 Min PRN    Or    dextrose 50% injection 50 mL  50 mL Intravenous Q15 Min PRN    Or    glucose (Dex4) 15 GM/59ML oral liquid 30 g  30 g Oral Q15 Min PRN    Or    glucose (Glutose) 40% oral gel 30 g  30 g Oral Q15 Min PRN    Or    glucose-vitamin C (Dex-4) chewable tab 8 tablet  8 tablet Oral Q15 Min PRN    insulin aspart (NovoLOG) 100 Units/mL FlexPen 1-7 Units  1-7 Units Subcutaneous TID CC    acetaminophen (Tylenol Extra Strength) tab 500 mg  500 mg Oral Q4H PRN    ondansetron (Zofran) 4 MG/2ML injection 4 mg  4 mg Intravenous Q6H PRN    prochlorperazine (Compazine) 10 MG/2ML injection 5 mg  5 mg Intravenous Q8H PRN    HYDROmorphone (Dilaudid) 1 MG/ML injection 0.2 mg  0.2 mg Intravenous Q2H PRN    Or    HYDROmorphone (Dilaudid) 1 MG/ML injection 0.4 mg  0.4 mg Intravenous Q2H PRN    Or    HYDROmorphone (Dilaudid) 1 MG/ML injection 0.8 mg  0.8 mg Intravenous Q2H PRN    acetaminophen (Tylenol) tab 650 mg  650 mg Oral Q4H PRN    Or    HYDROcodone-acetaminophen (Norco) 5-325 MG per tab 1 tablet  1 tablet Oral Q4H PRN    Or    HYDROcodone-acetaminophen (Norco) 5-325 MG per tab 2 tablet  2 tablet Oral Q4H PRN    temazepam (Restoril) cap 15 mg  15 mg Oral Nightly PRN    Vancomycin: PHARMACY DOSING  1 each Intravenous See Admin Instructions (RX holding)    guaiFENesin (Robitussin) 100 MG/5 ML oral liquid 200 mg  200 mg Oral Q4H PRN    apixaban (Eliquis) tab 5 mg  5 mg Oral BID    amLODIPine (Norvasc) tab 10 mg  10 mg Oral QAM    atorvastatin (Lipitor) tab 80 mg  80 mg Oral Nightly    metoprolol succinate ER (Toprol XL) 24 hr tab 100 mg  100 mg Oral Daily     metoprolol succinate ER (Toprol XL) 24 hr tab 25 mg  25 mg Oral Daily    sevelamer carbonate (Renvela) tab 2,400 mg  2,400 mg Oral TID CC    dextrose 1.5%-calcium 2.5 mEq/L peritoneal solution  5,000 mL Intraperitoneal Once in dialysis    benzocaine-menthol (Cepacol) lozenge 1 lozenge  1 lozenge Oral PRN    benzonatate (Tessalon) cap 100 mg  100 mg Oral TID PRN              Results:     Recent Labs   Lab 05/15/24  1110 05/16/24  0638   RBC 3.37* 2.88*   HGB 10.8* 9.4*   HCT 30.4* 26.2*   MCV 90.2 91.0   NEPRELIM 8.02* 5.32   WBC 9.7 6.8   .0 398.0     Recent Labs   Lab 05/15/24  1110 05/16/24  0638   * 190*   BUN 32* 32*   CREATSERUM 9.62* 8.86*   CA 8.5* 7.8*   ALB 4.0  --    * 130*   K 2.7* 2.9*   CL 92* 93*   CO2 30.0 30.0   ALKPHO 119*  --    AST 45*  --    ALT 54*  --    BILT 0.5  --    TP 7.7  --      No results found for: \"PTT\", \"INR\"  No results for input(s): \"BNP\" in the last 168 hours.  Recent Labs   Lab 05/15/24  1110   PHOS 5.0        Recent Labs   Lab 05/15/24  1110   PHOS 5.0   ALB 4.0       XR CHEST AP PORTABLE  (CPT=71045)    Result Date: 5/15/2024  CONCLUSION:  No radiographic evidence of acute cardiopulmonary process.    Dictated by (CST): Liane Márquez MD on 5/15/2024 at 12:05 PM     Finalized by (CST): Liane Márquez MD on 5/15/2024 at 12:06 PM               Assessment and Plan:      Patient is a 58 year old male with past medical history of ESRD on peritoneal dialysis, ESRD secondary to diabetic nephropathy, DM, hypertension, hyperlipidemia who presented for abdominal pain and peritoneal fluid being cloudy     1.ESRD: Transitioned from HD to PD few months back  On PD for close to 6 months  Will continue cycler overnight-9 hours with 2.5 L fill volume and 1.5% dextrose - 700 UF - repeat PD tonight   serum phosphorus high normal  Anemia secondary to chronic kidney disease-[facility] outpatient.  Hemoglobin at goal  Hypokalemia - dc low k diet. Potassium 40 meq today      2.  Peritonitis:  Cell count elevated at 2700- CS +ve GPC  IV vancomycin and cefepime  Peritoneal fluid and blood cultures pending  Will repeat the cell count again on Friday  ID consulted      3.secondary hyperparathyroidism: serum Phos high  normal  Continue sevelamer 3 tabs 3 times daily with meals     4.hypertension: Continue metoprolol and amlodipine     Discussed with nursing-discussed with dialysis nurse         Joel Santacruz MD  5/16/2024

## 2024-05-16 NOTE — CDS QUERY
How to answer this Query:    1.) DON'T CLICK COSIGN BUTTON FIRST  2.) Click \"3 dots...\" to the right of cosign button and click EDIT on the toolbar.  2.) Type an \"X\" in the bracket for the diagnosis that applies. (You may also add additional clinical details as you feel necessary to substantiate your response).   3.) Finally click \"Sign\" to complete response.  Thank You      CLINICAL DOCUMENTATION CLARIFICATION    DEAR DR VIRGEN    Please clarify the relationship, if any, between PERITONITIS and PERITONEAL DIALYSIS CATHETER    Are the conditions:    (x ) Due to or associated with each other  ( ) Unrelated to each other  ( ) Other, please specify    ________________________________________________________________________    RISK FACTORS:ESRD ON PERITONEAL DIALYISIS  CLINICAL INDICATORS:ABDOMINAL PAIN  Peritoneal fluid; Body fluid, unspecified   0 Result Notes  Body Fluid Smear  Abnormal   Gram positive cocci in clusters           TREATMENT:VANCOMYCIN AND CEFEPIME      If you have any questions, please contact Clinical :  Annetta SALGADO RN at [phone]     Thank You!    THIS FORM IS A PERMANENT PART OF THE MEDICAL RECORD

## 2024-05-16 NOTE — CONSULTS
[facility]  part of [facility] ID CONSULT NOTE    Gorge Alejandre Patient Status:  Inpatient    1965 MRN [id number]   Location [facility] 5SW/SE Attending Francie Arizmendi, DO   Hosp Day # 1 PCP Jatinder Hein MD       Reason for Consultation:  SBP    ASSESSMENT:    Antibiotics: Vancomycin, cefepime      # Acute peritonitis in setting of PD catheter, cx GPC in clusters   -PD catheter placed 2023  # ESRD on PD  # Acute fever  # Diabetes mellitus    PLAN:  -  Continue on vancomycin and cefepime.   -  FU final cx results. PD catheter may have to be removed. Plans for repeat cell count per nephrology.  -  Follow fever curve, wbc.  -  Reviewed labs, micro, imaging reports, available old records.  -  Case d/w patient, RN.      History of Present Illness:  Gorge Alejandre is a 58 year old male with a history of diabetes mellitus, ESRD on PD since 2023, who presented to [facility] ED on 5/15 with worsening abdominal pain. Patient states that he has been feeling unwell for the last month with ongoing abdominal pain. Started having fevers off and on for the last two weeks as high as 102. Did notice that his dialysate fluid was cloudy on Tuesday. On arrival, afebrile, wbc 9.7, PD fluid with 2722 wbcs, 91% PMNS, cx GPC in clusters, started on vancomycin and cefepime. Notes ongoing abdominal pain. ID consulted.    History:  Past Medical History:    Blood disorder    Anemia    Colon polyp    repeat CLN in 5 years    Diabetes (HCC)    Diet control. Pt off from medications for 4 months now.    Diabetes mellitus (HCC)    Dialysis patient (HCC)    Pt with temporary cath on R chest with HD started 2023 with HD q MWF.    Essential hypertension    High blood pressure    High cholesterol    History of blood transfusion    No blood transfusion reaction    Hyperlipidemia    Renal disorder    Strabismus    OD XT    Thrombus    Per pt had (+) clot on R IJ (temp HD cath)     Past Surgical  History:   Procedure Laterality Date    Cataract      Cataract extraction w/  intraocular lens implant Bilateral 2017    Dr. OZZY Coleman    Colonoscopy N/A 3/2/2022    Procedure: COLONOSCOPY;  Surgeon: ALLAN Gaffney MD;  Location: [facility] ENDOSCOPY    Other surgical history      strabismus surgery    Strabismus surgery Bilateral 1995    Vasectomy       Family History   Problem Relation Age of Onset    No Known Problems Father     Hypertension Mother     Diabetes Mother     Diabetes Brother     Hypertension Brother     Glaucoma Neg       reports that he has never smoked. He has never been exposed to tobacco smoke. He has never used smokeless tobacco. He reports current alcohol use. He reports that he does not use drugs.    Allergies:  Allergies   Allergen Reactions    Losartan Coughing     Tolerated in 2023       Medications:    Current Facility-Administered Medications:     [START ON 5/17/2024] epoetin anuja (Epogen, Procrit) 5,000 Units injection, 5,000 Units, Subcutaneous, Once per day on Monday Wednesday Friday    ceFEPIme (Maxipime) 1 g in sodium chloride 0.9% 100 mL IVPB-MBP, 1 g, Intravenous, Q24H    glucose (Dex4) 15 GM/59ML oral liquid 15 g, 15 g, Oral, Q15 Min PRN **OR** glucose (Glutose) 40% oral gel 15 g, 15 g, Oral, Q15 Min PRN **OR** glucose-vitamin C (Dex-4) chewable tab 4 tablet, 4 tablet, Oral, Q15 Min PRN **OR** dextrose 50% injection 50 mL, 50 mL, Intravenous, Q15 Min PRN **OR** glucose (Dex4) 15 GM/59ML oral liquid 30 g, 30 g, Oral, Q15 Min PRN **OR** glucose (Glutose) 40% oral gel 30 g, 30 g, Oral, Q15 Min PRN **OR** glucose-vitamin C (Dex-4) chewable tab 8 tablet, 8 tablet, Oral, Q15 Min PRN    insulin aspart (NovoLOG) 100 Units/mL FlexPen 1-7 Units, 1-7 Units, Subcutaneous, TID CC    acetaminophen (Tylenol Extra Strength) tab 500 mg, 500 mg, Oral, Q4H PRN    ondansetron (Zofran) 4 MG/2ML injection 4 mg, 4 mg, Intravenous, Q6H PRN    prochlorperazine (Compazine) 10 MG/2ML injection 5 mg, 5 mg,  Intravenous, Q8H PRN    HYDROmorphone (Dilaudid) 1 MG/ML injection 0.2 mg, 0.2 mg, Intravenous, Q2H PRN **OR** HYDROmorphone (Dilaudid) 1 MG/ML injection 0.4 mg, 0.4 mg, Intravenous, Q2H PRN **OR** HYDROmorphone (Dilaudid) 1 MG/ML injection 0.8 mg, 0.8 mg, Intravenous, Q2H PRN    acetaminophen (Tylenol) tab 650 mg, 650 mg, Oral, Q4H PRN **OR** HYDROcodone-acetaminophen (Norco) 5-325 MG per tab 1 tablet, 1 tablet, Oral, Q4H PRN **OR** HYDROcodone-acetaminophen (Norco) 5-325 MG per tab 2 tablet, 2 tablet, Oral, Q4H PRN    temazepam (Restoril) cap 15 mg, 15 mg, Oral, Nightly PRN    Vancomycin: PHARMACY DOSING, 1 each, Intravenous, See Admin Instructions (RX holding)    guaiFENesin (Robitussin) 100 MG/5 ML oral liquid 200 mg, 200 mg, Oral, Q4H PRN    apixaban (Eliquis) tab 5 mg, 5 mg, Oral, BID    amLODIPine (Norvasc) tab 10 mg, 10 mg, Oral, QAM    atorvastatin (Lipitor) tab 80 mg, 80 mg, Oral, Nightly    metoprolol succinate ER (Toprol XL) 24 hr tab 100 mg, 100 mg, Oral, Daily    metoprolol succinate ER (Toprol XL) 24 hr tab 25 mg, 25 mg, Oral, Daily    sevelamer carbonate (Renvela) tab 2,400 mg, 2,400 mg, Oral, TID CC    dextrose 1.5%-calcium 2.5 mEq/L peritoneal solution, 5,000 mL, Intraperitoneal, Once in dialysis    benzocaine-menthol (Cepacol) lozenge 1 lozenge, 1 lozenge, Oral, PRN    benzonatate (Tessalon) cap 100 mg, 100 mg, Oral, TID PRN    Review of Systems:  CONSTITUTIONAL:  No weight loss, weakness or fatigue.  HEENT:  Eyes:  No visual loss, blurred vision, double vision or yellow sclerae. Ears, Nose, Throat:  No hearing loss, sneezing, congestion, runny nose or sore throat.  SKIN:  No rash or itching.  CARDIOVASCULAR:  No chest pain, chest pressure or chest discomfort  RESPIRATORY:  No shortness of breath, cough or sputum.  GASTROINTESTINAL:  +Abdominal pain  GENITOURINARY:  No Burning on urination.   NEUROLOGICAL:  No headache, dizziness, syncope, paralysis, ataxia, numbness or tingling in the  extremities.  MUSCULOSKELETAL:  No muscle, back pain, joint pain or stiffness.  HEMATOLOGIC:  No anemia, bleeding or bruising.  ALLERGIES:  No history of asthma, hives, eczema or rhinitis.    Physical Exam:  Vital signs: Blood pressure 128/79, pulse 87, temperature 99.6 °F (37.6 °C), temperature source Oral, resp. rate 18, weight 178 lb 9.6 oz (81 kg), SpO2 94%.    General: Awake, in bed  HEENT: Moist mucous membranes. EOMI  Neck: No lymphadenopathy.  Supple.  Cardiovascular: RRR  Respiratory: Clear to auscultation bilaterally.  No wheezes. No rhonchi.  Abdomen: Distended, +LLQ pain, PD catheter c/d/i  Musculoskeletal: No edema noted  Integument: No lesions. No erythema.  Lines: PIV+    Laboratory Data:  Recent Labs   Lab 05/16/24  0638   RBC 2.88*   HGB 9.4*   HCT 26.2*   MCV 91.0   MCH 32.6   MCHC 35.9   RDW 13.2   NEPRELIM 5.32   WBC 6.8   .0     Recent Labs   Lab 05/15/24  1110 05/16/24  0638   * 190*   BUN 32* 32*   CREATSERUM 9.62* 8.86*   CA 8.5* 7.8*   ALB 4.0  --    * 130*   K 2.7* 2.9*   CL 92* 93*   CO2 30.0 30.0   ALKPHO 119*  --    AST 45*  --    ALT 54*  --    BILT 0.5  --    TP 7.7  --        Microbiology: Reviewed in EMR    Radiology: Reviewed    Thank you for allowing us to participate in the care of this patient. Please do not hesitate to call if you have any questions.   We will continue to follow with you and will make further recommendations based on his progress.    COLETTE [facility]  [phone]  5/16/2024

## 2024-05-16 NOTE — PROGRESS NOTES
[facility]  part of [facility]    Progress Note    Gorge Alejandre Patient Status:  Inpatient    1965 MRN [id number]   Location [facility] 5SW/SE Attending Francie Arizmendi,    Hosp Day # 1 PCP Jatinder Hein MD     Chief complaint abd pain     Subjective:   Gorge Alejandre is a(n) 58 year old male Pt reports pain is better with medication. No n/v     ROS:   No cp, sob   No c/d   No n/v     Objective:   Blood pressure 125/86, pulse 81, temperature 99.1 °F (37.3 °C), temperature source Oral, resp. rate 18, weight 178 lb 9.6 oz (81 kg), SpO2 95%.      Intake/Output Summary (Last 24 hours) at 2024 1554  Last data filed at 2024 0500  Gross per 24 hour   Intake 240 ml   Output -700 ml   Net 940 ml       Patient Weight(s) for the past 336 hrs:   Weight   05/15/24 1907 178 lb 9.6 oz (81 kg)   05/15/24 1010 183 lb (83 kg)           General appearance: alert, appears stated age and cooperative  Pulmonary:  clear to auscultation bilaterally  Cardiovascular: S1, S2 normal, no murmur, click, rub or gallop, regular rate and rhythm  Abdominal: soft, tender in lower abd, pd catheter in place , no erythema   Extremities: extremities normal, atraumatic, no cyanosis or edema        Medicines:     Current Facility-Administered Medications   Medication Dose Route Frequency    [START ON 2024] epoetin anuja (Epogen, Procrit) 5,000 Units injection  5,000 Units Subcutaneous Once per day on     dextrose 1.5%-calcium 2.5 mEq/L peritoneal solution  5,000 mL Intraperitoneal Once in dialysis    ceFEPIme (Maxipime) 1 g in sodium chloride 0.9% 100 mL IVPB-MBP  1 g Intravenous Q24H    glucose (Dex4) 15 GM/59ML oral liquid 15 g  15 g Oral Q15 Min PRN    Or    glucose (Glutose) 40% oral gel 15 g  15 g Oral Q15 Min PRN    Or    glucose-vitamin C (Dex-4) chewable tab 4 tablet  4 tablet Oral Q15 Min PRN    Or    dextrose 50% injection 50 mL  50 mL Intravenous Q15 Min PRN    Or     glucose (Dex4) 15 GM/59ML oral liquid 30 g  30 g Oral Q15 Min PRN    Or    glucose (Glutose) 40% oral gel 30 g  30 g Oral Q15 Min PRN    Or    glucose-vitamin C (Dex-4) chewable tab 8 tablet  8 tablet Oral Q15 Min PRN    insulin aspart (NovoLOG) 100 Units/mL FlexPen 1-7 Units  1-7 Units Subcutaneous TID CC    acetaminophen (Tylenol Extra Strength) tab 500 mg  500 mg Oral Q4H PRN    ondansetron (Zofran) 4 MG/2ML injection 4 mg  4 mg Intravenous Q6H PRN    prochlorperazine (Compazine) 10 MG/2ML injection 5 mg  5 mg Intravenous Q8H PRN    HYDROmorphone (Dilaudid) 1 MG/ML injection 0.2 mg  0.2 mg Intravenous Q2H PRN    Or    HYDROmorphone (Dilaudid) 1 MG/ML injection 0.4 mg  0.4 mg Intravenous Q2H PRN    Or    HYDROmorphone (Dilaudid) 1 MG/ML injection 0.8 mg  0.8 mg Intravenous Q2H PRN    acetaminophen (Tylenol) tab 650 mg  650 mg Oral Q4H PRN    Or    HYDROcodone-acetaminophen (Norco) 5-325 MG per tab 1 tablet  1 tablet Oral Q4H PRN    Or    HYDROcodone-acetaminophen (Norco) 5-325 MG per tab 2 tablet  2 tablet Oral Q4H PRN    temazepam (Restoril) cap 15 mg  15 mg Oral Nightly PRN    Vancomycin: PHARMACY DOSING  1 each Intravenous See Admin Instructions (RX holding)    guaiFENesin (Robitussin) 100 MG/5 ML oral liquid 200 mg  200 mg Oral Q4H PRN    apixaban (Eliquis) tab 5 mg  5 mg Oral BID    amLODIPine (Norvasc) tab 10 mg  10 mg Oral QAM    atorvastatin (Lipitor) tab 80 mg  80 mg Oral Nightly    metoprolol succinate ER (Toprol XL) 24 hr tab 100 mg  100 mg Oral Daily    metoprolol succinate ER (Toprol XL) 24 hr tab 25 mg  25 mg Oral Daily    sevelamer carbonate (Renvela) tab 2,400 mg  2,400 mg Oral TID CC    dextrose 1.5%-calcium 2.5 mEq/L peritoneal solution  5,000 mL Intraperitoneal Once in dialysis    benzocaine-menthol (Cepacol) lozenge 1 lozenge  1 lozenge Oral PRN    benzonatate (Tessalon) cap 100 mg  100 mg Oral TID PRN       Lab Results   Component Value Date    WBC 6.8 05/16/2024    HGB 9.4 (L) 05/16/2024     HCT 26.2 (L) 05/16/2024    .0 05/16/2024    CREATSERUM 8.86 (H) 05/16/2024    BUN 32 (H) 05/16/2024     (L) 05/16/2024    K 2.9 (LL) 05/16/2024    CL 93 (L) 05/16/2024    CO2 30.0 05/16/2024     (H) 05/16/2024    CA 7.8 (L) 05/16/2024    ALB 4.0 05/15/2024    ALKPHO 119 (H) 05/15/2024    BILT 0.5 05/15/2024    TP 7.7 05/15/2024    AST 45 (H) 05/15/2024    ALT 54 (H) 05/15/2024    TSH 1.870 08/01/2023    PSA 0.9 02/10/2017    CRP <0.29 08/01/2023    MG 2.0 09/08/2023    PHOS 5.0 05/15/2024    B12 585 08/01/2023       XR CHEST AP PORTABLE  (CPT=71045)    Result Date: 5/15/2024  CONCLUSION:  No radiographic evidence of acute cardiopulmonary process.    Dictated by (CST): Liane Márquez MD on 5/15/2024 at 12:05 PM     Finalized by (CST): Liane Márquez MD on 5/15/2024 at 12:06 PM               Results:     CBC:    Lab Results   Component Value Date    WBC 6.8 05/16/2024    WBC 9.7 05/15/2024    WBC 6.8 09/08/2023     Lab Results   Component Value Date    HGB 9.4 (L) 05/16/2024    HGB 10.8 (L) 05/15/2024    HGB 9.6 (L) 09/08/2023      Lab Results   Component Value Date    .0 05/16/2024    .0 05/15/2024    .0 09/08/2023       Recent Labs   Lab 05/15/24  1110 05/16/24  0638   * 190*   BUN 32* 32*   CREATSERUM 9.62* 8.86*   CA 8.5* 7.8*   * 130*   K 2.7* 2.9*   CL 92* 93*   CO2 30.0 30.0             Assessment and Plan:      ASSESSMENT AND PLAN:    1.       Clinical presentation suggestive of possible underlying spontaneous bacterial peritonitis.    - apprec ID and Renal   - cont iv cefepime and vanco   - await final cxs, gpc clusters in fluid   - blood cxs neg to date     2.       End-stage renal disease, on peritoneal dialysis for last 6 mo's. Transitioned from HD  - cont PD per Renal     3.       Diabetes mellitus type 2.  A1c 6.9   - accu checks ac and hs      4. Right internal jugular dvt - on eliquis since pos doppler in sep   5. HTN - cont metoprolol and  amlodipine    Dvt ppx on linda Arizmendi DO         Chart reviewed, including current vitals, notes, labs and imaging  Labs ordered and medications adjusted as outlined above  Coordinate care with care team/consultants  Discussed with patient results of tests, management plan as outlined above, and the need for ongoing hospitalization  D/w RN     [facility] high        5/16/2024

## 2024-05-16 NOTE — PLAN OF CARE
Pt on PD, Peritoneal fluid positive for G+ cocci clusters. MD notified. Pt complains of dry cough. Cough relieved with PRN meds. Per dialysis RN pt retained 700mls. Safety precautions in place, call light within reach.  Problem: Patient Centered Care  Goal: Patient preferences are identified and integrated in the patient's plan of care  Description: Interventions:  - What would you like us to know as we care for you? From home alone  - Provide timely, complete, and accurate information to patient/family  - Incorporate patient and family knowledge, values, beliefs, and cultural backgrounds into the planning and delivery of care  - Encourage patient/family to participate in care and decision-making at the level they choose  - Honor patient and family perspectives and choices  5/16/2024 0423 by Mariela Rock RN  Outcome: Progressing  5/16/2024 0423 by Mariela Rock RN  Outcome: Progressing     Problem: Diabetes/Glucose Control  Goal: Glucose maintained within prescribed range  Description: INTERVENTIONS:  - Monitor Blood Glucose as ordered  - Assess for signs and symptoms of hyperglycemia and hypoglycemia  - Administer ordered medications to maintain glucose within target range  - Assess barriers to adequate nutritional intake and initiate nutrition consult as needed  - Instruct patient on self management of diabetes  5/16/2024 0423 by Mariela Rock RN  Outcome: Progressing  5/16/2024 0423 by Mariela Rock RN  Outcome: Progressing     Problem: Patient/Family Goals  Goal: Patient/Family Long Term Goal  Description: Patient's Long Term Goal: determine source of pain    Interventions:  - imaging, microbiology  - See additional Care Plan goals for specific interventions  5/16/2024 0423 by Mariela Rock RN  Outcome: Progressing  5/16/2024 0423 by Mariela Rock RN  Outcome: Progressing  Goal: Patient/Family Short Term Goal  Description: Patient's Short Term Goal stop cough    Interventions:   - meds  - See  additional Care Plan goals for specific interventions  5/16/2024 0423 by Mariela Rock, RN  Outcome: Progressing  5/16/2024 0423 by Mariela Rock, RN  Outcome: Progressing

## 2024-05-17 ENCOUNTER — APPOINTMENT (OUTPATIENT)
Dept: GENERAL RADIOLOGY | Facility: HOSPITAL | Age: 59
DRG: 907 | End: 2024-05-17
Attending: INTERNAL MEDICINE

## 2024-05-17 PROBLEM — K65.9 PERITONITIS (HCC): Status: ACTIVE | Noted: 2024-05-17

## 2024-05-17 LAB
ANION GAP SERPL CALC-SCNC: 10 MMOL/L (ref 0–18)
BASOPHILS # BLD AUTO: 0.02 X10(3) UL (ref 0–0.2)
BASOPHILS NFR BLD AUTO: 0.2 %
BUN BLD-MCNC: 34 MG/DL (ref 9–23)
BUN/CREAT SERPL: 3.4 (ref 10–20)
CALCIUM BLD-MCNC: 8.1 MG/DL (ref 8.7–10.4)
CHLORIDE SERPL-SCNC: 95 MMOL/L (ref 98–112)
CO2 SERPL-SCNC: 27 MMOL/L (ref 21–32)
CREAT BLD-MCNC: 9.91 MG/DL
DEPRECATED RDW RBC AUTO: 43.2 FL (ref 35.1–46.3)
EGFRCR SERPLBLD CKD-EPI 2021: 6 ML/MIN/1.73M2 (ref 60–?)
EOSINOPHIL # BLD AUTO: 0.28 X10(3) UL (ref 0–0.7)
EOSINOPHIL NFR BLD AUTO: 3 %
ERYTHROCYTE [DISTWIDTH] IN BLOOD BY AUTOMATED COUNT: 13 % (ref 11–15)
GLUCOSE BLD-MCNC: 107 MG/DL (ref 70–99)
GLUCOSE BLDC GLUCOMTR-MCNC: 128 MG/DL (ref 70–99)
GLUCOSE BLDC GLUCOMTR-MCNC: 147 MG/DL (ref 70–99)
GLUCOSE BLDC GLUCOMTR-MCNC: 153 MG/DL (ref 70–99)
GLUCOSE BLDC GLUCOMTR-MCNC: 203 MG/DL (ref 70–99)
HCT VFR BLD AUTO: 29 %
HGB BLD-MCNC: 10.1 G/DL
IMM GRANULOCYTES # BLD AUTO: 0.09 X10(3) UL (ref 0–1)
IMM GRANULOCYTES NFR BLD: 1 %
LYMPHOCYTES # BLD AUTO: 1.21 X10(3) UL (ref 1–4)
LYMPHOCYTES NFR BLD AUTO: 13.1 %
MCH RBC QN AUTO: 31.9 PG (ref 26–34)
MCHC RBC AUTO-ENTMCNC: 34.8 G/DL (ref 31–37)
MCV RBC AUTO: 91.5 FL
MONOCYTES # BLD AUTO: 0.54 X10(3) UL (ref 0.1–1)
MONOCYTES NFR BLD AUTO: 5.8 %
NEUTROPHILS # BLD AUTO: 7.1 X10 (3) UL (ref 1.5–7.7)
NEUTROPHILS # BLD AUTO: 7.1 X10(3) UL (ref 1.5–7.7)
NEUTROPHILS NFR BLD AUTO: 76.9 %
OSMOLALITY SERPL CALC.SUM OF ELEC: 282 MOSM/KG (ref 275–295)
PLATELET # BLD AUTO: 400 10(3)UL (ref 150–450)
POTASSIUM SERPL-SCNC: 2.8 MMOL/L (ref 3.5–5.1)
RBC # BLD AUTO: 3.17 X10(6)UL
SODIUM SERPL-SCNC: 132 MMOL/L (ref 136–145)
VANCOMYCIN SERPL-MCNC: 21.9 UG/ML (ref ?–40)
WBC # BLD AUTO: 9.2 X10(3) UL (ref 4–11)

## 2024-05-17 PROCEDURE — 99233 SBSQ HOSP IP/OBS HIGH 50: CPT | Performed by: HOSPITALIST

## 2024-05-17 PROCEDURE — 90945 DIALYSIS ONE EVALUATION: CPT | Performed by: INTERNAL MEDICINE

## 2024-05-17 PROCEDURE — 74018 RADEX ABDOMEN 1 VIEW: CPT | Performed by: INTERNAL MEDICINE

## 2024-05-17 RX ORDER — HEPARIN SODIUM 5000 [USP'U]/ML
5000 INJECTION, SOLUTION INTRAVENOUS; SUBCUTANEOUS EVERY 8 HOURS SCHEDULED
Status: DISCONTINUED | OUTPATIENT
Start: 2024-05-17 | End: 2024-05-22

## 2024-05-17 RX ORDER — POTASSIUM CHLORIDE 20 MEQ/1
20 TABLET, EXTENDED RELEASE ORAL DAILY
Status: DISCONTINUED | OUTPATIENT
Start: 2024-05-18 | End: 2024-05-18

## 2024-05-17 RX ORDER — DEXTROSE MONOHYDRATE, SODIUM CHLORIDE, SODIUM LACTATE, CALCIUM CHLORIDE, MAGNESIUM CHLORIDE 1.5; 538; 448; 18.4; 5.08 G/100ML; MG/100ML; MG/100ML; MG/100ML; MG/100ML
5000 SOLUTION INTRAPERITONEAL
Status: DISCONTINUED | OUTPATIENT
Start: 2024-05-17 | End: 2024-05-17 | Stop reason: ALTCHOICE

## 2024-05-17 RX ORDER — POTASSIUM CHLORIDE 1.5 G/1.58G
40 POWDER, FOR SOLUTION ORAL ONCE
Status: COMPLETED | OUTPATIENT
Start: 2024-05-17 | End: 2024-05-17

## 2024-05-17 NOTE — PROGRESS NOTES
[facility]  part of [facility]    Nephrology Progress Note    Chief Complaint   Patient presents with    Abdomen/Flank Pain       Subjective:   58 year old male, following for ESRD on PD.     Denies abdominal pain.   Reports had a BM yesterday.   Had difficulty with PD fluid draining last night.     Review of Systems:   Review of Systems   Constitutional:  Negative for chills, fatigue and fever.   Respiratory:  Negative for cough and shortness of breath.    Cardiovascular:  Negative for chest pain and leg swelling.   Gastrointestinal:  Negative for abdominal pain, constipation, diarrhea, nausea and vomiting.   Genitourinary:  Negative for difficulty urinating, dysuria and flank pain.       Objective:   Temp:  [98.4 °F (36.9 °C)-99.8 °F (37.7 °C)] 98.4 °F (36.9 °C)  Pulse:  [78-83] 80  Resp:  [18] 18  BP: (129-155)/(83-93) 155/87  SpO2:  [94 %-96 %] 95 %  SpO2: 95 %     Intake/Output Summary (Last 24 hours) at 5/17/2024 1431  Last data filed at 5/17/2024 0950  Gross per 24 hour   Intake 240 ml   Output --   Net 240 ml     Wt Readings from Last 3 Encounters:   05/15/24 178 lb 9.6 oz (81 kg)   12/12/23 195 lb (88.5 kg)   12/08/23 195 lb (88.5 kg)     Physical Exam  Constitutional:       Appearance: Normal appearance.   Cardiovascular:      Rate and Rhythm: Normal rate and regular rhythm.      Heart sounds: Normal heart sounds.   Pulmonary:      Effort: Pulmonary effort is normal.      Breath sounds: Normal breath sounds.   Abdominal:      Comments: PD catheter-neg erythema   Musculoskeletal:         General: Normal range of motion.   Skin:     General: Skin is warm and dry.   Neurological:      General: No focal deficit present.      Mental Status: He is alert and oriented to person, place, and time.   Psychiatric:         Mood and Affect: Mood normal.         Behavior: Behavior normal.         Medications:  Current Facility-Administered Medications   Medication Dose Route Frequency    dextrose  1.5%-calcium 2.5 mEq/L peritoneal solution  5,000 mL Intraperitoneal Once in dialysis    epoetin anuja (Epogen, Procrit) 5,000 Units injection  5,000 Units Subcutaneous Once per day on Monday Wednesday Friday    dextrose 1.5%-calcium 2.5 mEq/L peritoneal solution  5,000 mL Intraperitoneal Once in dialysis    insulin aspart (NovoLOG) 100 Units/mL FlexPen 1-7 Units  1-7 Units Subcutaneous TID CC and HS    glucose (Dex4) 15 GM/59ML oral liquid 15 g  15 g Oral Q15 Min PRN    Or    glucose (Glutose) 40% oral gel 15 g  15 g Oral Q15 Min PRN    Or    glucose-vitamin C (Dex-4) chewable tab 4 tablet  4 tablet Oral Q15 Min PRN    Or    dextrose 50% injection 50 mL  50 mL Intravenous Q15 Min PRN    Or    glucose (Dex4) 15 GM/59ML oral liquid 30 g  30 g Oral Q15 Min PRN    Or    glucose (Glutose) 40% oral gel 30 g  30 g Oral Q15 Min PRN    Or    glucose-vitamin C (Dex-4) chewable tab 8 tablet  8 tablet Oral Q15 Min PRN    acetaminophen (Tylenol Extra Strength) tab 500 mg  500 mg Oral Q4H PRN    ondansetron (Zofran) 4 MG/2ML injection 4 mg  4 mg Intravenous Q6H PRN    prochlorperazine (Compazine) 10 MG/2ML injection 5 mg  5 mg Intravenous Q8H PRN    HYDROmorphone (Dilaudid) 1 MG/ML injection 0.2 mg  0.2 mg Intravenous Q2H PRN    Or    HYDROmorphone (Dilaudid) 1 MG/ML injection 0.4 mg  0.4 mg Intravenous Q2H PRN    Or    HYDROmorphone (Dilaudid) 1 MG/ML injection 0.8 mg  0.8 mg Intravenous Q2H PRN    acetaminophen (Tylenol) tab 650 mg  650 mg Oral Q4H PRN    Or    HYDROcodone-acetaminophen (Norco) 5-325 MG per tab 1 tablet  1 tablet Oral Q4H PRN    Or    HYDROcodone-acetaminophen (Norco) 5-325 MG per tab 2 tablet  2 tablet Oral Q4H PRN    temazepam (Restoril) cap 15 mg  15 mg Oral Nightly PRN    Vancomycin: PHARMACY DOSING  1 each Intravenous See Admin Instructions (RX holding)    guaiFENesin (Robitussin) 100 MG/5 ML oral liquid 200 mg  200 mg Oral Q4H PRN    amLODIPine (Norvasc) tab 10 mg  10 mg Oral QAM    atorvastatin (Lipitor)  tab 80 mg  80 mg Oral Nightly    metoprolol succinate ER (Toprol XL) 24 hr tab 100 mg  100 mg Oral Daily    metoprolol succinate ER (Toprol XL) 24 hr tab 25 mg  25 mg Oral Daily    sevelamer carbonate (Renvela) tab 2,400 mg  2,400 mg Oral TID CC    dextrose 1.5%-calcium 2.5 mEq/L peritoneal solution  5,000 mL Intraperitoneal Once in dialysis    benzocaine-menthol (Cepacol) lozenge 1 lozenge  1 lozenge Oral PRN    benzonatate (Tessalon) cap 100 mg  100 mg Oral TID PRN              Results:     Recent Labs   Lab 05/15/24  1110 05/16/24  0638 05/17/24  0624   RBC 3.37* 2.88* 3.17*   HGB 10.8* 9.4* 10.1*   HCT 30.4* 26.2* 29.0*   MCV 90.2 91.0 91.5   NEPRELIM 8.02* 5.32 7.10   WBC 9.7 6.8 9.2   .0 398.0 400.0     Recent Labs   Lab 05/15/24  1110 05/16/24  0638 05/17/24  0624   * 190* 107*   BUN 32* 32* 34*   CREATSERUM 9.62* 8.86* 9.91*   CA 8.5* 7.8* 8.1*   ALB 4.0  --   --    * 130* 132*   K 2.7* 2.9* 2.8*   CL 92* 93* 95*   CO2 30.0 30.0 27.0   ALKPHO 119*  --   --    AST 45*  --   --    ALT 54*  --   --    BILT 0.5  --   --    TP 7.7  --   --      No results found for: \"PTT\", \"INR\"  No results for input(s): \"BNP\" in the last 168 hours.  Recent Labs   Lab 05/15/24  1110   PHOS 5.0        Recent Labs   Lab 05/15/24  1110   PHOS 5.0   ALB 4.0           Assessment and Plan:     Patient is a 58 year old male with past medical history of T2DM, HTN, HLD, and ESRD on peritoneal dialysis, who presented for abdominal pain. Found to have PD catheter related peritonitis.     Peritonitis:  Cell count elevated at 2700 and fluid positive for Staph aureus.   On IV vancomycin. Cefepime dced.   Repeat cell count and culture ordered. If cell count is improving then does not need PD catheter removal. Discussed with ID.     ESRD on PD:   Had difficulty with draining last night. Did not complete HD.   Will add heparin 1000 units to each bag.     Hyperphosphatemia:   Cont Renvela 3 tabs with meals.     Hypokalemia:    Potassium chloride 40 meq today.      HTN:   Cont with current meds.         Angela Hernandez MD  5/17/2024

## 2024-05-17 NOTE — PLAN OF CARE
Problem: Patient Centered Care  Goal: Patient preferences are identified and integrated in the patient's plan of care  Description: Interventions:  - What would you like us to know as we care for you? From home alone  - Provide timely, complete, and accurate information to patient/family  - Incorporate patient and family knowledge, values, beliefs, and cultural backgrounds into the planning and delivery of care  - Encourage patient/family to participate in care and decision-making at the level they choose  - Honor patient and family perspectives and choices  Outcome: Progressing     Problem: Diabetes/Glucose Control  Goal: Glucose maintained within prescribed range  Description: INTERVENTIONS:  - Monitor Blood Glucose as ordered  - Assess for signs and symptoms of hyperglycemia and hypoglycemia  - Administer ordered medications to maintain glucose within target range  - Assess barriers to adequate nutritional intake and initiate nutrition consult as needed  - Instruct patient on self management of diabetes  Outcome: Progressing     Problem: Patient/Family Goals  Goal: Patient/Family Long Term Goal  Description: Patient's Long Term Goal: determine source of pain    Interventions:  - imaging, microbiology  - See additional Care Plan goals for specific interventions  Outcome: Progressing  Goal: Patient/Family Short Term Goal  Description: Patient's Short Term Goal stop cough    Interventions:   - meds  - See additional Care Plan goals for specific interventions  Outcome: Progressing     Patient is presently resting in the bed. Alert x 4. Vital signs taken and stable. Tolerates diet. No complaints of abdominal pain or discomfort. Patient is self care and independent. Patient on fluid restrictions per nephrologist orders. Intravenous antibiotics were discontinued. General Surgeon, Dr. Annetta Willis on consult for Peritoneal dialysis catheter removal. Potassium level this am was 2.8 and was notified twice to  Nephrologist, Dr. Hernandez. Oral potassium replacement orders were entered by Dr. Arizmendi. Call light within reach at all times.

## 2024-05-17 NOTE — PLAN OF CARE
Phone call made to [facility] and spoke to Shy at 3:22 pm to arrange for peritoneal dialysis treatment for today, 5/17/2024 per Dr. Angela Hernandez orders.

## 2024-05-17 NOTE — PROGRESS NOTES
[facility]  part of [facility]    Progress Note    Gorge Alejandre Patient Status:  Inpatient    1965 MRN [id number]   Location [facility] 5SW/SE Attending Francie Arizmendi, DO   Hosp Day # 2 PCP Jatinder Hein MD     Chief complaint abd pain     Subjective:   Gorge Alejandre is a(n) 58 year old male Pt reports pain is better with medication. No n/v     ROS:   No cp, sob   No c/d   No n/v     Objective:   Blood pressure 155/87, pulse 80, temperature 98.4 °F (36.9 °C), temperature source Oral, resp. rate 18, weight 189 lb 6.4 oz (85.9 kg), SpO2 95%.      Intake/Output Summary (Last 24 hours) at 2024 1545  Last data filed at 2024 0950  Gross per 24 hour   Intake 240 ml   Output --   Net 240 ml       Patient Weight(s) for the past 336 hrs:   Weight   24 1400 189 lb 6.4 oz (85.9 kg)   05/15/24 1907 178 lb 9.6 oz (81 kg)   05/15/24 1010 183 lb (83 kg)           General appearance: alert, appears stated age and cooperative  Pulmonary:  clear to auscultation bilaterally  Cardiovascular: S1, S2 normal, no murmur, click, rub or gallop, regular rate and rhythm  Abdominal: soft, tender in lower abd, pd catheter in place , no erythema   Extremities: extremities normal, atraumatic, no cyanosis or edema        Medicines:     Current Facility-Administered Medications   Medication Dose Route Frequency    dextrose 1.5%-calcium 2.5 mEq/L peritoneal solution  5,000 mL Intraperitoneal Once in dialysis    epoetin anuja (Epogen, Procrit) 5,000 Units injection  5,000 Units Subcutaneous Once per day on     dextrose 1.5%-calcium 2.5 mEq/L peritoneal solution  5,000 mL Intraperitoneal Once in dialysis    insulin aspart (NovoLOG) 100 Units/mL FlexPen 1-7 Units  1-7 Units Subcutaneous TID CC and HS    glucose (Dex4) 15 GM/59ML oral liquid 15 g  15 g Oral Q15 Min PRN    Or    glucose (Glutose) 40% oral gel 15 g  15 g Oral Q15 Min PRN    Or    glucose-vitamin C (Dex-4)  chewable tab 4 tablet  4 tablet Oral Q15 Min PRN    Or    dextrose 50% injection 50 mL  50 mL Intravenous Q15 Min PRN    Or    glucose (Dex4) 15 GM/59ML oral liquid 30 g  30 g Oral Q15 Min PRN    Or    glucose (Glutose) 40% oral gel 30 g  30 g Oral Q15 Min PRN    Or    glucose-vitamin C (Dex-4) chewable tab 8 tablet  8 tablet Oral Q15 Min PRN    acetaminophen (Tylenol Extra Strength) tab 500 mg  500 mg Oral Q4H PRN    ondansetron (Zofran) 4 MG/2ML injection 4 mg  4 mg Intravenous Q6H PRN    prochlorperazine (Compazine) 10 MG/2ML injection 5 mg  5 mg Intravenous Q8H PRN    HYDROmorphone (Dilaudid) 1 MG/ML injection 0.2 mg  0.2 mg Intravenous Q2H PRN    Or    HYDROmorphone (Dilaudid) 1 MG/ML injection 0.4 mg  0.4 mg Intravenous Q2H PRN    Or    HYDROmorphone (Dilaudid) 1 MG/ML injection 0.8 mg  0.8 mg Intravenous Q2H PRN    acetaminophen (Tylenol) tab 650 mg  650 mg Oral Q4H PRN    Or    HYDROcodone-acetaminophen (Norco) 5-325 MG per tab 1 tablet  1 tablet Oral Q4H PRN    Or    HYDROcodone-acetaminophen (Norco) 5-325 MG per tab 2 tablet  2 tablet Oral Q4H PRN    temazepam (Restoril) cap 15 mg  15 mg Oral Nightly PRN    Vancomycin: PHARMACY DOSING  1 each Intravenous See Admin Instructions (RX holding)    guaiFENesin (Robitussin) 100 MG/5 ML oral liquid 200 mg  200 mg Oral Q4H PRN    amLODIPine (Norvasc) tab 10 mg  10 mg Oral QAM    atorvastatin (Lipitor) tab 80 mg  80 mg Oral Nightly    metoprolol succinate ER (Toprol XL) 24 hr tab 100 mg  100 mg Oral Daily    metoprolol succinate ER (Toprol XL) 24 hr tab 25 mg  25 mg Oral Daily    sevelamer carbonate (Renvela) tab 2,400 mg  2,400 mg Oral TID CC    dextrose 1.5%-calcium 2.5 mEq/L peritoneal solution  5,000 mL Intraperitoneal Once in dialysis    benzocaine-menthol (Cepacol) lozenge 1 lozenge  1 lozenge Oral PRN    benzonatate (Tessalon) cap 100 mg  100 mg Oral TID PRN       Lab Results   Component Value Date    WBC 9.2 05/17/2024    HGB 10.1 (L) 05/17/2024    HCT 29.0  (L) 05/17/2024    .0 05/17/2024    CREATSERUM 9.91 (H) 05/17/2024    BUN 34 (H) 05/17/2024     (L) 05/17/2024    K 2.8 (LL) 05/17/2024    CL 95 (L) 05/17/2024    CO2 27.0 05/17/2024     (H) 05/17/2024    CA 8.1 (L) 05/17/2024    ALB 4.0 05/15/2024    ALKPHO 119 (H) 05/15/2024    BILT 0.5 05/15/2024    TP 7.7 05/15/2024    AST 45 (H) 05/15/2024    ALT 54 (H) 05/15/2024    TSH 1.870 08/01/2023    PSA 0.9 02/10/2017    CRP <0.29 08/01/2023    MG 2.0 09/08/2023    PHOS 5.0 05/15/2024    B12 585 08/01/2023       No results found.        Results:     CBC:    Lab Results   Component Value Date    WBC 9.2 05/17/2024    WBC 6.8 05/16/2024    WBC 9.7 05/15/2024     Lab Results   Component Value Date    HGB 10.1 (L) 05/17/2024    HGB 9.4 (L) 05/16/2024    HGB 10.8 (L) 05/15/2024      Lab Results   Component Value Date    .0 05/17/2024    .0 05/16/2024    .0 05/15/2024       Recent Labs   Lab 05/15/24  1110 05/16/24  0638 05/17/24  0624   * 190* 107*   BUN 32* 32* 34*   CREATSERUM 9.62* 8.86* 9.91*   CA 8.5* 7.8* 8.1*   * 130* 132*   K 2.7* 2.9* 2.8*   CL 92* 93* 95*   CO2 30.0 30.0 27.0             Assessment and Plan:      ASSESSMENT AND PLAN:    1.       Clinical presentation suggestive of possible underlying spontaneous bacterial peritonitis.    - apprec ID and Renal   - cont iv cefepime and vanco   - await final cxs, gpc clusters in fluid - staph aureus   - blood cxs neg to date     2.       End-stage renal disease, on peritoneal dialysis for last 6 mo's. Transitioned from HD  - cont PD per Renal     3.       Diabetes mellitus type 2.  A1c 6.9   - accu checks ac and hs      4. Right internal jugular dvt - on eliquis since pos doppler in sep - pt reports he has been off of it. Will stop   5. HTN - cont metoprolol and amlodipine    Dvt ppx heparin              Francie Arizmendi, DO         Chart reviewed, including current vitals, notes, labs and imaging  Labs ordered and  medications adjusted as outlined above  Coordinate care with care team/consultants  Discussed with patient results of tests, management plan as outlined above, and the need for ongoing hospitalization  D/w RN     MDM high

## 2024-05-17 NOTE — DIETARY NOTE
ADULT NUTRITION INITIAL ASSESSMENT    Pt is at moderate nutrition risk.  Pt does not meet malnutrition criteria.      RECOMMENDATIONS TO MD: See Nutrition Intervention for oral nutritional supplement (ONS) specifics     ADMITTING DIAGNOSIS:  Abdominal pain, acute [R10.9]  Peritoneal dialysis catheter in place (HCC) [Z99.2]  PERTINENT PAST MEDICAL HISTORY:   Past Medical History:    Blood disorder    Anemia    Colon polyp    repeat CLN in 5 years    Diabetes (HCC)    Diet control. Pt off from medications for 4 months now.    Diabetes mellitus (HCC)    Dialysis patient (HCC)    Pt with temporary cath on R chest with HD started 8/2023 with HD q MWF.    Essential hypertension    High blood pressure    High cholesterol    History of blood transfusion    No blood transfusion reaction    Hyperlipidemia    Renal disorder    Strabismus    OD XT    Thrombus    Per pt had (+) clot on R IJ (temp HD cath)     PATIENT STATUS: Initial 05/17/24: Pt admit for abdominal pain and peritoneal dialysis catheter in place. PMH sig for ESRD on PD (on dialysis since August 2023, started on HD then transitioned to PD about 6 months ago), Diabetes and others noted above. Pt assessed due to screening at risk for decreased appetite and unintentional weight loss. Pt known to nutrition services from previous admissions, last seen 8/2/23. Chart reviewed, pt admitted from home with c/o abdominal pain w/ swelling. Discussion with RN, no concerns. Pt visited, meal tray at bedside. Pt reports appetite slowly coming back since admit - ate 1 meal day of admit, 1 yesterday and 2 today so far. Intakes reviewed, noted 100% x 3 meals. Prior to admission (PTA), pt noted decreased/minimal po intake over last 8 days r/t abdominal pain. Prior to 8 days ago pt with good intake, typically eats 2 meals a baseline. Denies use of oral nutritional supplement (ONS) PTA. Pt denies any nausea at this time. Pt reports weight relatively stable, usual body weight (UBW about  194#. Current weight  189# 6.4 oz (on bed-scale). Admit wt 178# 10 oz on 5/15/24. Monitor weight. Per EMR weight review, pt noted weighing 195# on 12/12/23 and 209# 3 oz on 6/16/23. Non-significant weight loss over last year. Nutrition focused physical exam (NFPE) completed, no wasting noted. Encouraged adequate energy and protein intake. Discussed adding ONS to help maximize nutrition, pt in agreement to Nepro daily. +ONS per discussion.     FOOD/NUTRITION RELATED HISTORY:  Appetite:  decreased appetite PTA over last 8 days per pt but slowly improving  Intake: ~100% x3 meals documented since admit  Intake Meeting Needs: Yes, and oral nutrition supplements (ONS) to maximize  Percent Meals Eaten (last 3 days)       Date/Time Percent Meals Eaten (%)    05/15/24 1800 100 %    05/16/24 1404 100 %    05/17/24 0950 100 %           Food Allergies: No Known Food Allergies (NKFA)  Cultural/Ethnic/Religion Preferences: Not Obtained    GASTROINTESTINAL: +BM 5/15/2024    MEDICATIONS: reviewed Renvela TID with meals  Received KCl rider today (40 meq)   dextrose 1.5%-calcium 2.5 mEq/L  5,000 mL Intraperitoneal Once in dialysis    epoetin anuja  5,000 Units Subcutaneous Once per day on Monday Wednesday Friday    dextrose 1.5%-calcium 2.5 mEq/L  5,000 mL Intraperitoneal Once in dialysis    insulin aspart  1-7 Units Subcutaneous TID CC and HS    Vancomycin IV  1 each Intravenous See Admin Instructions (RX holding)    amLODIPine  10 mg Oral QAM    atorvastatin  80 mg Oral Nightly    metoprolol succinate ER  100 mg Oral Daily    metoprolol succinate ER  25 mg Oral Daily    sevelamer carbonate  2,400 mg Oral TID CC    dextrose 1.5%-calcium 2.5 mEq/L  5,000 mL Intraperitoneal Once in dialysis     LABS: reviewed Hypokalemia (2.8) and Hyponatremia (132) noted  Recent Labs     05/15/24  1110 05/16/24  0638 05/17/24  0624   * 190* 107*   BUN 32* 32* 34*   CREATSERUM 9.62* 8.86* 9.91*   CA 8.5* 7.8* 8.1*   * 130* 132*   K 2.7*  2.9* 2.8*   CL 92* 93* 95*   CO2 30.0 30.0 27.0   PHOS 5.0  --   --    OSMOCALC 282 282 282     NUTRITION RELATED PHYSICAL FINDINGS:  - Nutrition Focused Physical Exam (NFPE): no wasting noted   - Fluid Accumulation: none  see RN documentation for details  - Skin Integrity: intact see RN documentation for details    ANTHROPOMETRICS:  HT:  172.7 cm (5' 8\")  WT: 85.9 kg (189 lb 6.4 oz)   Admit WT: 178# 9.6 oz on 5/15/24  BMI: Body mass index is 28.8 kg/m².  BMI CLASSIFICATION: 25-29.9 kg/m2 - overweight  IBW: 154 lbs        123% IBW  Usual Body Wt: 194 lbs per pt      98% UBW    WEIGHT HISTORY:  Patient Weight(s) for the past 336 hrs:   Weight   05/17/24 1400 85.9 kg (189 lb 6.4 oz)   05/15/24 1907 81 kg (178 lb 9.6 oz)   05/15/24 1010 83 kg (183 lb)     Wt Readings from Last 10 Encounters:   05/17/24 85.9 kg (189 lb 6.4 oz)   12/12/23 88.5 kg (195 lb)   12/08/23 88.5 kg (195 lb)   09/22/23 84.8 kg (187 lb)   09/19/23 88.9 kg (196 lb)   09/18/23 88.9 kg (196 lb)   09/08/23 86.2 kg (190 lb)   09/08/23 87.6 kg (193 lb 1.6 oz)   08/18/23 88.4 kg (194 lb 14.4 oz)   08/11/23 89.8 kg (198 lb)     NUTRITION DIAGNOSIS/PROBLEM:   Inadequate oral intake related to Decreased ability to consume sufficient energy as evidenced by pt reports decreased/minimal intake x 8 days PTA    NUTRITION INTERVENTION:   NUTRITION PRESCRIPTION:   Estimated Nutrition needs: --dosing wt of 85.9 kg - wt taken on 5/17/2024  Calories: 0485-1481 calories/day (22-25 calories per kg Dosing wt)  Protein: 84 g protein/day (1.2 g protein/kg Ideal body wt (IBW)70kg)  Fluid Needs: 1500 ml per MD    - Diet:       Procedures    Regular/General diet Calorie Restriction/Carb Controlled: 1800 kcal/60 grams; Fluid Restriction: 1500 ml; Is Patient on Accuchecks? Yes; Misc Restriction: Low Phosphorus      - RD Care Plan: Initiated ONS (oral nutritional supplements)  - Meals and snacks: Encouraged adequate PO intake  - Medical Food Supplements-RD added [place]425  calories/ 19 g protein each) Daily Mixed Coles. Rational/use of oral supplements discussed.  - Vitamin and mineral supplements: none  - Feeding assistance: independent  - Nutrition education: Discussed importance of adequate energy and protein intake     - Coordination of nutrition care: collaboration with other providers  - Discharge and transfer of nutrition care to new setting or provider: monitor plans    MONITOR AND EVALUATE/NUTRITION GOALS:  - Food and Nutrient Intake:      Monitor: adequacy of PO intake and adequacy of supplement intake  - Food and Nutrient Administration:      Monitor: N/A  - Anthropometric Measurement:    Monitor weight  - Nutrition Goals:      maintain wt within 5%, PO and supplement greater than 75% of needs, good supplement intake, labs within acceptable limits, euglycemia, minimize lean body mass loss, prevent skin breakdown, support body systems, monitor fluid status, and improved GI status    DIETITIAN FOLLOW UP: RD to follow and monitor nutrition status    Nora Uriostegui MS, ESTELA, RDN, LDN  [id number]

## 2024-05-17 NOTE — PROGRESS NOTES
INFECTIOUS DISEASE PROGRESS NOTE  [facility]  part of [facility] ID PROGRESS NOTE    Gorge Alejandre Patient Status:  Inpatient    1965 MRN [id number]   Location [facility] 5SW/SE Attending Francie Arizmendi, DO   Hosp Day # 2 PCP Jatinder Hein MD     Subjective:  ROS reviewed. Tmax 99.8. Feels like abdominal pain has improved.    ASSESSMENT:    Antibiotics: Vancomycin  cefepime     # Acute peritonitis in setting of PD catheter, cx  Staph aureus               -PD catheter placed 2023  # ESRD on PD  # Acute fever  # Diabetes mellitus     PLAN:  -  Continue on vancomycin. Stop cefepime.  -  Would recommend PD catheter removal.  -  Follow fever curve, wbc.  -  Reviewed labs, micro, imaging reports, available old records.  -  Case d/w patient, RN.     History of Present Illness:  Gorge Alejandre is a 58 year old male with a history of diabetes mellitus, ESRD on PD since 2023, who presented to [facility] ED on 5/15 with worsening abdominal pain. Patient states that he has been feeling unwell for the last month with ongoing abdominal pain. Started having fevers off and on for the last two weeks as high as 102. Did notice that his dialysate fluid was cloudy on Tuesday. On arrival, afebrile, wbc 9.7, PD fluid with 2722 wbcs, 91% PMNS, cx GPC in clusters, started on vancomycin and cefepime. Notes ongoing abdominal pain. ID consulted.    Physical Exam:  /86 (BP Location: Right arm)   Pulse 83   Temp 99.8 °F (37.7 °C) (Oral)   Resp 18   Wt 178 lb 9.6 oz (81 kg)   SpO2 96%   BMI 27.16 kg/m²     Gen:   Awake, in bed  HEENT:  EOMI, neck supple  CV/lungs:  Regular rate and rhythm, CTAB  Abdom:  Soft, PD catheter c/d/i  Skin/extrem:  No rashes, no c/c/e  Lines:  PIV+    Laboratory Data: Reviewed    Microbiology: Reviewed    Radiology: Reviewed      [facility]  [phone]  2024

## 2024-05-17 NOTE — CONSULTS
I was consulted by Dr. Arizmendi for PD catheter removal. The consult was subsequently cancelled by Dr. Arizmendi.

## 2024-05-18 LAB
ANION GAP SERPL CALC-SCNC: 11 MMOL/L (ref 0–18)
BASOPHILS # BLD AUTO: 0.03 X10(3) UL (ref 0–0.2)
BASOPHILS NFR BLD AUTO: 0.2 %
BUN BLD-MCNC: 51 MG/DL (ref 9–23)
BUN/CREAT SERPL: 4.6 (ref 10–20)
CALCIUM BLD-MCNC: 8 MG/DL (ref 8.7–10.4)
CHLORIDE SERPL-SCNC: 97 MMOL/L (ref 98–112)
CO2 SERPL-SCNC: 24 MMOL/L (ref 21–32)
CREAT BLD-MCNC: 11.12 MG/DL
DEPRECATED RDW RBC AUTO: 42.8 FL (ref 35.1–46.3)
EGFRCR SERPLBLD CKD-EPI 2021: 5 ML/MIN/1.73M2 (ref 60–?)
EOSINOPHIL # BLD AUTO: 0.25 X10(3) UL (ref 0–0.7)
EOSINOPHIL NFR BLD AUTO: 2 %
ERYTHROCYTE [DISTWIDTH] IN BLOOD BY AUTOMATED COUNT: 13 % (ref 11–15)
GLUCOSE BLD-MCNC: 107 MG/DL (ref 70–99)
GLUCOSE BLDC GLUCOMTR-MCNC: 110 MG/DL (ref 70–99)
GLUCOSE BLDC GLUCOMTR-MCNC: 129 MG/DL (ref 70–99)
GLUCOSE BLDC GLUCOMTR-MCNC: 142 MG/DL (ref 70–99)
GLUCOSE BLDC GLUCOMTR-MCNC: 165 MG/DL (ref 70–99)
HCT VFR BLD AUTO: 26.2 %
HGB BLD-MCNC: 9.5 G/DL
IMM GRANULOCYTES # BLD AUTO: 0.26 X10(3) UL (ref 0–1)
IMM GRANULOCYTES NFR BLD: 2 %
LYMPHOCYTES # BLD AUTO: 1.01 X10(3) UL (ref 1–4)
LYMPHOCYTES NFR BLD AUTO: 7.9 %
MCH RBC QN AUTO: 32.6 PG (ref 26–34)
MCHC RBC AUTO-ENTMCNC: 36.3 G/DL (ref 31–37)
MCV RBC AUTO: 90 FL
MONOCYTES # BLD AUTO: 0.79 X10(3) UL (ref 0.1–1)
MONOCYTES NFR BLD AUTO: 6.2 %
NEUTROPHILS # BLD AUTO: 10.43 X10 (3) UL (ref 1.5–7.7)
NEUTROPHILS # BLD AUTO: 10.43 X10(3) UL (ref 1.5–7.7)
NEUTROPHILS NFR BLD AUTO: 81.7 %
OSMOLALITY SERPL CALC.SUM OF ELEC: 288 MOSM/KG (ref 275–295)
PLATELET # BLD AUTO: 371 10(3)UL (ref 150–450)
POTASSIUM SERPL-SCNC: 3 MMOL/L (ref 3.5–5.1)
RBC # BLD AUTO: 2.91 X10(6)UL
SODIUM SERPL-SCNC: 132 MMOL/L (ref 136–145)
WBC # BLD AUTO: 12.8 X10(3) UL (ref 4–11)

## 2024-05-18 PROCEDURE — 99233 SBSQ HOSP IP/OBS HIGH 50: CPT | Performed by: HOSPITALIST

## 2024-05-18 PROCEDURE — 99232 SBSQ HOSP IP/OBS MODERATE 35: CPT | Performed by: INTERNAL MEDICINE

## 2024-05-18 NOTE — PROGRESS NOTES
[facility]  part of [facility]    Nephrology Progress Note    Chief Complaint   Patient presents with    Abdomen/Flank Pain       Subjective:   58 year old male, following for ESRD on PD.     Yesterday 1L of PD fluid was infused but none was drained. Catheter is able to infuse but fluid is not draining at all.   Reports some abdominal pain due to extra fluid.     Review of Systems:   Review of Systems   Constitutional:  Negative for chills, fatigue and fever.   Respiratory:  Negative for cough and shortness of breath.    Cardiovascular:  Negative for chest pain and leg swelling.   Gastrointestinal:  Positive for abdominal pain. Negative for constipation, diarrhea, nausea and vomiting.   Genitourinary:  Negative for difficulty urinating, dysuria and flank pain.       Objective:   Temp:  [98.7 °F (37.1 °C)-99.7 °F (37.6 °C)] 99.2 °F (37.3 °C)  Pulse:  [84-92] 86  Resp:  [18] 18  BP: (139-149)/(70-92) 146/92  SpO2:  [95 %-96 %] 96 %  SpO2: 96 %     Intake/Output Summary (Last 24 hours) at 5/18/2024 1513  Last data filed at 5/18/2024 0745  Gross per 24 hour   Intake 45 ml   Output --   Net 45 ml     Wt Readings from Last 3 Encounters:   05/18/24 194 lb 1.6 oz (88 kg)   12/12/23 195 lb (88.5 kg)   12/08/23 195 lb (88.5 kg)     Physical Exam  Constitutional:       Appearance: Normal appearance.   Cardiovascular:      Rate and Rhythm: Normal rate and regular rhythm.      Heart sounds: Normal heart sounds.   Pulmonary:      Effort: Pulmonary effort is normal.      Breath sounds: Normal breath sounds.   Abdominal:      Comments: PD catheter-neg erythema   Musculoskeletal:         General: Normal range of motion.   Skin:     General: Skin is warm and dry.   Neurological:      General: No focal deficit present.      Mental Status: He is alert and oriented to person, place, and time.   Psychiatric:         Mood and Affect: Mood normal.         Behavior: Behavior normal.         Medications:  Current  Facility-Administered Medications   Medication Dose Route Frequency    heparin (Porcine) 5000 UNIT/ML injection 5,000 Units  5,000 Units Subcutaneous Q8H KHOA    epoetin anuja (Epogen, Procrit) 5,000 Units injection  5,000 Units Subcutaneous Once per day on Monday Wednesday Friday    dextrose 1.5%-calcium 2.5 mEq/L peritoneal solution  5,000 mL Intraperitoneal Once in dialysis    insulin aspart (NovoLOG) 100 Units/mL FlexPen 1-7 Units  1-7 Units Subcutaneous TID CC and HS    glucose (Dex4) 15 GM/59ML oral liquid 15 g  15 g Oral Q15 Min PRN    Or    glucose (Glutose) 40% oral gel 15 g  15 g Oral Q15 Min PRN    Or    glucose-vitamin C (Dex-4) chewable tab 4 tablet  4 tablet Oral Q15 Min PRN    Or    dextrose 50% injection 50 mL  50 mL Intravenous Q15 Min PRN    Or    glucose (Dex4) 15 GM/59ML oral liquid 30 g  30 g Oral Q15 Min PRN    Or    glucose (Glutose) 40% oral gel 30 g  30 g Oral Q15 Min PRN    Or    glucose-vitamin C (Dex-4) chewable tab 8 tablet  8 tablet Oral Q15 Min PRN    acetaminophen (Tylenol Extra Strength) tab 500 mg  500 mg Oral Q4H PRN    ondansetron (Zofran) 4 MG/2ML injection 4 mg  4 mg Intravenous Q6H PRN    prochlorperazine (Compazine) 10 MG/2ML injection 5 mg  5 mg Intravenous Q8H PRN    HYDROmorphone (Dilaudid) 1 MG/ML injection 0.2 mg  0.2 mg Intravenous Q2H PRN    Or    HYDROmorphone (Dilaudid) 1 MG/ML injection 0.4 mg  0.4 mg Intravenous Q2H PRN    Or    HYDROmorphone (Dilaudid) 1 MG/ML injection 0.8 mg  0.8 mg Intravenous Q2H PRN    acetaminophen (Tylenol) tab 650 mg  650 mg Oral Q4H PRN    Or    HYDROcodone-acetaminophen (Norco) 5-325 MG per tab 1 tablet  1 tablet Oral Q4H PRN    Or    HYDROcodone-acetaminophen (Norco) 5-325 MG per tab 2 tablet  2 tablet Oral Q4H PRN    temazepam (Restoril) cap 15 mg  15 mg Oral Nightly PRN    Vancomycin: PHARMACY DOSING  1 each Intravenous See Admin Instructions (RX holding)    guaiFENesin (Robitussin) 100 MG/5 ML oral liquid 200 mg  200 mg Oral Q4H PRN     amLODIPine (Norvasc) tab 10 mg  10 mg Oral QAM    atorvastatin (Lipitor) tab 80 mg  80 mg Oral Nightly    metoprolol succinate ER (Toprol XL) 24 hr tab 100 mg  100 mg Oral Daily    metoprolol succinate ER (Toprol XL) 24 hr tab 25 mg  25 mg Oral Daily    sevelamer carbonate (Renvela) tab 2,400 mg  2,400 mg Oral TID CC    dextrose 1.5%-calcium 2.5 mEq/L peritoneal solution  5,000 mL Intraperitoneal Once in dialysis    benzocaine-menthol (Cepacol) lozenge 1 lozenge  1 lozenge Oral PRN    benzonatate (Tessalon) cap 100 mg  100 mg Oral TID PRN              Results:     Recent Labs   Lab 05/16/24  0638 05/17/24 0624 05/18/24  1020   RBC 2.88* 3.17* 2.91*   HGB 9.4* 10.1* 9.5*   HCT 26.2* 29.0* 26.2*   MCV 91.0 91.5 90.0   NEPRELIM 5.32 7.10 10.43*   WBC 6.8 9.2 12.8*   .0 400.0 371.0     Recent Labs   Lab 05/15/24  1110 05/16/24 0638 05/17/24 0624 05/18/24  1020   * 190* 107* 107*   BUN 32* 32* 34* 51*   CREATSERUM 9.62* 8.86* 9.91* 11.12*   CA 8.5* 7.8* 8.1* 8.0*   ALB 4.0  --   --   --    * 130* 132* 132*   K 2.7* 2.9* 2.8* 3.0*   CL 92* 93* 95* 97*   CO2 30.0 30.0 27.0 24.0   ALKPHO 119*  --   --   --    AST 45*  --   --   --    ALT 54*  --   --   --    BILT 0.5  --   --   --    TP 7.7  --   --   --      No results found for: \"PTT\", \"INR\"  No results for input(s): \"BNP\" in the last 168 hours.  Recent Labs   Lab 05/15/24  1110   PHOS 5.0        Recent Labs   Lab 05/15/24  1110   PHOS 5.0   ALB 4.0           Assessment and Plan:     Patient is a 58 year old male with past medical history of T2DM, HTN, HLD, and ESRD on peritoneal dialysis, who presented for abdominal pain. Found to have PD catheter related peritonitis.     Peritonitis:  Cell count elevated at 2700 and fluid positive for Staph aureus.   On IV vancomycin. Cefepime dced.   Repeat cell count and culture ordered. However, fluid is not draining and not able to get a sample.     ESRD on PD:   He will need PD catheter removal since it is  not working. Will hold PD tonight.   For TDC on Monday and will need outpatient HD set up at [facility] in [place].     Hyperphosphatemia:   Cont Renvela 3 tabs with meals.     Hypokalemia:   Potassium 3 today. Will not replete since HD will be on Monday after TDC placement.      HTN:   Cont with current meds.         Angela Hernandez MD  5/18/2024

## 2024-05-18 NOTE — PROGRESS NOTES
[facility]  part of [facility]    Progress Note    Gorge Alejandre Patient Status:  Inpatient    1965 MRN [id number]   Location [facility] 5SW/SE Attending Francie Arizmendi, DO   Hosp Day # 3 PCP Jatinder Hein MD     Chief complaint abd pain     Subjective:   Gorge Alejandre is a(n) 58 year old male Pt doing well this am. Reports abd feels tight.      ROS:   No cp, sob   No c/d   No n/v     Objective:   Blood pressure (!) 146/92, pulse 86, temperature 99.2 °F (37.3 °C), temperature source Oral, resp. rate 18, weight 194 lb 1.6 oz (88 kg), SpO2 96%.      Intake/Output Summary (Last 24 hours) at 2024 1528  Last data filed at 2024 0745  Gross per 24 hour   Intake 45 ml   Output --   Net 45 ml       Patient Weight(s) for the past 336 hrs:   Weight   24 0601 194 lb 1.6 oz (88 kg)   24 1400 189 lb 6.4 oz (85.9 kg)   05/15/24 1907 178 lb 9.6 oz (81 kg)   05/15/24 1010 183 lb (83 kg)           General appearance: alert, appears stated age and cooperative  Pulmonary:  clear to auscultation bilaterally  Cardiovascular: S1, S2 normal, no murmur, click, rub or gallop, regular rate and rhythm  Abdominal: soft, tender in lower abd, pd catheter in place , no erythema   Extremities: extremities normal, atraumatic, no cyanosis or edema        Medicines:     Current Facility-Administered Medications   Medication Dose Route Frequency    heparin (Porcine) 5000 UNIT/ML injection 5,000 Units  5,000 Units Subcutaneous Q8H KHOA    epoetin anuja (Epogen, Procrit) 5,000 Units injection  5,000 Units Subcutaneous Once per day on     dextrose 1.5%-calcium 2.5 mEq/L peritoneal solution  5,000 mL Intraperitoneal Once in dialysis    insulin aspart (NovoLOG) 100 Units/mL FlexPen 1-7 Units  1-7 Units Subcutaneous TID CC and HS    glucose (Dex4) 15 GM/59ML oral liquid 15 g  15 g Oral Q15 Min PRN    Or    glucose (Glutose) 40% oral gel 15 g  15 g Oral Q15 Min PRN    Or     glucose-vitamin C (Dex-4) chewable tab 4 tablet  4 tablet Oral Q15 Min PRN    Or    dextrose 50% injection 50 mL  50 mL Intravenous Q15 Min PRN    Or    glucose (Dex4) 15 GM/59ML oral liquid 30 g  30 g Oral Q15 Min PRN    Or    glucose (Glutose) 40% oral gel 30 g  30 g Oral Q15 Min PRN    Or    glucose-vitamin C (Dex-4) chewable tab 8 tablet  8 tablet Oral Q15 Min PRN    acetaminophen (Tylenol Extra Strength) tab 500 mg  500 mg Oral Q4H PRN    ondansetron (Zofran) 4 MG/2ML injection 4 mg  4 mg Intravenous Q6H PRN    prochlorperazine (Compazine) 10 MG/2ML injection 5 mg  5 mg Intravenous Q8H PRN    HYDROmorphone (Dilaudid) 1 MG/ML injection 0.2 mg  0.2 mg Intravenous Q2H PRN    Or    HYDROmorphone (Dilaudid) 1 MG/ML injection 0.4 mg  0.4 mg Intravenous Q2H PRN    Or    HYDROmorphone (Dilaudid) 1 MG/ML injection 0.8 mg  0.8 mg Intravenous Q2H PRN    acetaminophen (Tylenol) tab 650 mg  650 mg Oral Q4H PRN    Or    HYDROcodone-acetaminophen (Norco) 5-325 MG per tab 1 tablet  1 tablet Oral Q4H PRN    Or    HYDROcodone-acetaminophen (Norco) 5-325 MG per tab 2 tablet  2 tablet Oral Q4H PRN    temazepam (Restoril) cap 15 mg  15 mg Oral Nightly PRN    Vancomycin: PHARMACY DOSING  1 each Intravenous See Admin Instructions (RX holding)    guaiFENesin (Robitussin) 100 MG/5 ML oral liquid 200 mg  200 mg Oral Q4H PRN    amLODIPine (Norvasc) tab 10 mg  10 mg Oral QAM    atorvastatin (Lipitor) tab 80 mg  80 mg Oral Nightly    metoprolol succinate ER (Toprol XL) 24 hr tab 100 mg  100 mg Oral Daily    metoprolol succinate ER (Toprol XL) 24 hr tab 25 mg  25 mg Oral Daily    sevelamer carbonate (Renvela) tab 2,400 mg  2,400 mg Oral TID CC    dextrose 1.5%-calcium 2.5 mEq/L peritoneal solution  5,000 mL Intraperitoneal Once in dialysis    benzocaine-menthol (Cepacol) lozenge 1 lozenge  1 lozenge Oral PRN    benzonatate (Tessalon) cap 100 mg  100 mg Oral TID PRN       Lab Results   Component Value Date    WBC 12.8 (H) 05/18/2024     HGB 9.5 (L) 05/18/2024    HCT 26.2 (L) 05/18/2024    .0 05/18/2024    CREATSERUM 11.12 (H) 05/18/2024    BUN 51 (H) 05/18/2024     (L) 05/18/2024    K 3.0 (L) 05/18/2024    CL 97 (L) 05/18/2024    CO2 24.0 05/18/2024     (H) 05/18/2024    CA 8.0 (L) 05/18/2024    ALB 4.0 05/15/2024    ALKPHO 119 (H) 05/15/2024    BILT 0.5 05/15/2024    TP 7.7 05/15/2024    AST 45 (H) 05/15/2024    ALT 54 (H) 05/15/2024    TSH 1.870 08/01/2023    PSA 0.9 02/10/2017    CRP <0.29 08/01/2023    MG 2.0 09/08/2023    PHOS 5.0 05/15/2024    B12 585 08/01/2023       XR ABDOMEN (1 VIEW) (CPT=74018)    Result Date: 5/17/2024  CONCLUSION: Peritoneal dialysis catheter enters from the left lower quadrant with distal end coiled in the right lower quadrant.  No fold, disconnect, or kink along the tract of the PD catheter.  Soft tissue emphysema around the entry point of the catheter likely related to manipulation or recent placement.   Dictated by (CST): Johnathan Blanc MD on 5/17/2024 at 8:13 PM     Finalized by (CST): Johnathan Blanc MD on 5/17/2024 at 8:15 PM               Results:     CBC:    Lab Results   Component Value Date    WBC 12.8 (H) 05/18/2024    WBC 9.2 05/17/2024    WBC 6.8 05/16/2024     Lab Results   Component Value Date    HGB 9.5 (L) 05/18/2024    HGB 10.1 (L) 05/17/2024    HGB 9.4 (L) 05/16/2024      Lab Results   Component Value Date    .0 05/18/2024    .0 05/17/2024    .0 05/16/2024       Recent Labs   Lab 05/16/24  0638 05/17/24  0624 05/18/24  1020   * 107* 107*   BUN 32* 34* 51*   CREATSERUM 8.86* 9.91* 11.12*   CA 7.8* 8.1* 8.0*   * 132* 132*   K 2.9* 2.8* 3.0*   CL 93* 95* 97*   CO2 30.0 27.0 24.0             Assessment and Plan:      ASSESSMENT AND PLAN:    1.       Clinical presentation suggestive of possible underlying spontaneous bacterial peritonitis.    - apprec ID and Renal   - cont iv cefepime and vanco   - await final cxs, gpc clusters in fluid - staph aureus   -  blood cxs neg to date   - plan PD catheter removal. Plan surgery consult     2.       End-stage renal disease, on peritoneal dialysis for last 6 mo's. Transitioned from HD  - PD catheter no longer working so will need it removed. Consult Surgery Dr Willis. Apprec input     3.       Diabetes mellitus type 2.  A1c 6.9   - accu checks ac and hs      4. Right internal jugular dvt - on eliquis since pos doppler in sep - pt reports he has been off of it. Will stop     5. HTN - cont metoprolol and amlodipine    Dvt ppx heparin              Francie Arizmendi, DO         Chart reviewed, including current vitals, notes, labs and imaging  Labs ordered and medications adjusted as outlined above  Coordinate care with care team/consultants  Discussed with patient results of tests, management plan as outlined above, and the need for ongoing hospitalization  D/w RN     MDM high

## 2024-05-18 NOTE — PLAN OF CARE
HD RN tried to aspirate peritoneal fluid; unable to aspirate. No acute changes noted throughout shift. Call light within reach. Calls appropriately.    Problem: Patient Centered Care  Goal: Patient preferences are identified and integrated in the patient's plan of care  Description: Interventions:  - What would you like us to know as we care for you? From home alone  - Provide timely, complete, and accurate information to patient/family  - Incorporate patient and family knowledge, values, beliefs, and cultural backgrounds into the planning and delivery of care  - Encourage patient/family to participate in care and decision-making at the level they choose  - Honor patient and family perspectives and choices  Outcome: Progressing     Problem: Diabetes/Glucose Control  Goal: Glucose maintained within prescribed range  Description: INTERVENTIONS:  - Monitor Blood Glucose as ordered  - Assess for signs and symptoms of hyperglycemia and hypoglycemia  - Administer ordered medications to maintain glucose within target range  - Assess barriers to adequate nutritional intake and initiate nutrition consult as needed  - Instruct patient on self management of diabetes  Outcome: Progressing     Problem: Patient/Family Goals  Goal: Patient/Family Long Term Goal  Description: Patient's Long Term Goal: determine source of pain    Interventions:  - imaging, microbiology  - See additional Care Plan goals for specific interventions  Outcome: Progressing  Goal: Patient/Family Short Term Goal  Description: Patient's Short Term Goal stop cough    Interventions:   - meds  - See additional Care Plan goals for specific interventions  Outcome: Progressing

## 2024-05-18 NOTE — PLAN OF CARE
No acute changes. Patient c/o mild tenderness and bloating to abdomen. Cultures unable to be obtained from peritoneal fluid by dialysis nurse, nephrologist notified. Patient resting comfortably, call light within reach.    Problem: Patient Centered Care  Goal: Patient preferences are identified and integrated in the patient's plan of care  Description: Interventions:  - What would you like us to know as we care for you? From home alone  - Provide timely, complete, and accurate information to patient/family  - Incorporate patient and family knowledge, values, beliefs, and cultural backgrounds into the planning and delivery of care  - Encourage patient/family to participate in care and decision-making at the level they choose  - Honor patient and family perspectives and choices  Outcome: Progressing     Problem: Diabetes/Glucose Control  Goal: Glucose maintained within prescribed range  Description: INTERVENTIONS:  - Monitor Blood Glucose as ordered  - Assess for signs and symptoms of hyperglycemia and hypoglycemia  - Administer ordered medications to maintain glucose within target range  - Assess barriers to adequate nutritional intake and initiate nutrition consult as needed  - Instruct patient on self management of diabetes  Outcome: Progressing     Problem: Patient/Family Goals  Goal: Patient/Family Long Term Goal  Description: Patient's Long Term Goal: determine source of pain    Interventions:  - imaging, microbiology  - See additional Care Plan goals for specific interventions  Outcome: Progressing  Goal: Patient/Family Short Term Goal  Description: Patient's Short Term Goal stop cough    Interventions:   - meds  - See additional Care Plan goals for specific interventions  Outcome: Progressing     Problem: PAIN - ADULT  Goal: Verbalizes/displays adequate comfort level or patient's stated pain goal  Description: INTERVENTIONS:  - Encourage pt to monitor pain and request assistance  - Assess pain using  appropriate pain scale  - Administer analgesics based on type and severity of pain and evaluate response  - Implement non-pharmacological measures as appropriate and evaluate response  - Consider cultural and social influences on pain and pain management  - Manage/alleviate anxiety  - Utilize distraction and/or relaxation techniques  - Monitor for opioid side effects  - Notify MD/LIP if interventions unsuccessful or patient reports new pain  - Anticipate increased pain with activity and pre-medicate as appropriate  Outcome: Progressing     Problem: METABOLIC/FLUID AND ELECTROLYTES - ADULT  Goal: Hemodynamic stability and optimal renal function maintained  Description: INTERVENTIONS:  - Monitor labs and assess for signs and symptoms of volume excess or deficit  - Monitor intake, output and patient weight  - Monitor urine specific gravity, serum osmolarity and serum sodium as indicated or ordered  - Monitor response to interventions for patient's volume status, including labs, urine output, blood pressure (other measures as available)  - Encourage oral intake as appropriate  - Instruct patient on fluid and nutrition restrictions as appropriate  Outcome: Progressing     Problem: METABOLIC/FLUID AND ELECTROLYTES - ADULT  Goal: Electrolytes maintained within normal limits  Description: INTERVENTIONS:  - Monitor labs and rhythm and assess patient for signs and symptoms of electrolyte imbalances  - Administer electrolyte replacement as ordered  - Monitor response to electrolyte replacements, including rhythm and repeat lab results as appropriate  - Fluid restriction as ordered  - Instruct patient on fluid and nutrition restrictions as appropriate  Outcome: Not Progressing

## 2024-05-19 LAB
ANION GAP SERPL CALC-SCNC: 7 MMOL/L (ref 0–18)
APTT PPP: 39.9 SECONDS (ref 23–36)
BASOPHILS # BLD AUTO: 0.04 X10(3) UL (ref 0–0.2)
BASOPHILS NFR BLD AUTO: 0.3 %
BUN BLD-MCNC: 54 MG/DL (ref 9–23)
BUN/CREAT SERPL: 4.6 (ref 10–20)
CALCIUM BLD-MCNC: 8 MG/DL (ref 8.7–10.4)
CHLORIDE SERPL-SCNC: 96 MMOL/L (ref 98–112)
CO2 SERPL-SCNC: 27 MMOL/L (ref 21–32)
CREAT BLD-MCNC: 11.79 MG/DL
DEPRECATED RDW RBC AUTO: 42.5 FL (ref 35.1–46.3)
EGFRCR SERPLBLD CKD-EPI 2021: 5 ML/MIN/1.73M2 (ref 60–?)
EOSINOPHIL # BLD AUTO: 0.29 X10(3) UL (ref 0–0.7)
EOSINOPHIL NFR BLD AUTO: 2.4 %
ERYTHROCYTE [DISTWIDTH] IN BLOOD BY AUTOMATED COUNT: 13 % (ref 11–15)
GLUCOSE BLD-MCNC: 117 MG/DL (ref 70–99)
GLUCOSE BLDC GLUCOMTR-MCNC: 106 MG/DL (ref 70–99)
GLUCOSE BLDC GLUCOMTR-MCNC: 126 MG/DL (ref 70–99)
GLUCOSE BLDC GLUCOMTR-MCNC: 180 MG/DL (ref 70–99)
GLUCOSE BLDC GLUCOMTR-MCNC: 182 MG/DL (ref 70–99)
HCT VFR BLD AUTO: 24.2 %
HGB BLD-MCNC: 8.8 G/DL
IMM GRANULOCYTES # BLD AUTO: 0.27 X10(3) UL (ref 0–1)
IMM GRANULOCYTES NFR BLD: 2.3 %
INR BLD: 1.07 (ref 0.8–1.2)
LYMPHOCYTES # BLD AUTO: 0.99 X10(3) UL (ref 1–4)
LYMPHOCYTES NFR BLD AUTO: 8.3 %
MCH RBC QN AUTO: 32.7 PG (ref 26–34)
MCHC RBC AUTO-ENTMCNC: 36.4 G/DL (ref 31–37)
MCV RBC AUTO: 90 FL
MONOCYTES # BLD AUTO: 0.76 X10(3) UL (ref 0.1–1)
MONOCYTES NFR BLD AUTO: 6.4 %
NEUTROPHILS # BLD AUTO: 9.53 X10 (3) UL (ref 1.5–7.7)
NEUTROPHILS # BLD AUTO: 9.53 X10(3) UL (ref 1.5–7.7)
NEUTROPHILS NFR BLD AUTO: 80.3 %
OSMOLALITY SERPL CALC.SUM OF ELEC: 286 MOSM/KG (ref 275–295)
PLATELET # BLD AUTO: 358 10(3)UL (ref 150–450)
POTASSIUM SERPL-SCNC: 2.8 MMOL/L (ref 3.5–5.1)
PROTHROMBIN TIME: 14.6 SECONDS (ref 11.6–14.8)
RBC # BLD AUTO: 2.69 X10(6)UL
SODIUM SERPL-SCNC: 130 MMOL/L (ref 136–145)
VANCOMYCIN SERPL-MCNC: 18.7 UG/ML (ref ?–40)
WBC # BLD AUTO: 11.9 X10(3) UL (ref 4–11)

## 2024-05-19 PROCEDURE — 99233 SBSQ HOSP IP/OBS HIGH 50: CPT | Performed by: HOSPITALIST

## 2024-05-19 PROCEDURE — 99232 SBSQ HOSP IP/OBS MODERATE 35: CPT | Performed by: INTERNAL MEDICINE

## 2024-05-19 PROCEDURE — 99254 IP/OBS CNSLTJ NEW/EST MOD 60: CPT | Performed by: SURGERY

## 2024-05-19 RX ORDER — POTASSIUM CHLORIDE 20 MEQ/1
20 TABLET, EXTENDED RELEASE ORAL ONCE
Status: COMPLETED | OUTPATIENT
Start: 2024-05-19 | End: 2024-05-19

## 2024-05-19 RX ORDER — ALBUMIN (HUMAN) 12.5 G/50ML
25 SOLUTION INTRAVENOUS
Status: ACTIVE | OUTPATIENT
Start: 2024-05-19 | End: 2024-05-21

## 2024-05-19 RX ORDER — HEPARIN SODIUM 1000 [USP'U]/ML
4 INJECTION, SOLUTION INTRAVENOUS; SUBCUTANEOUS
Status: DISCONTINUED | OUTPATIENT
Start: 2024-05-19 | End: 2024-05-22

## 2024-05-19 NOTE — PLAN OF CARE
No acute changes. Patient resting comfortably, call light within reach. Patient calls appropriately.    Problem: Patient Centered Care  Goal: Patient preferences are identified and integrated in the patient's plan of care  Description: Interventions:  - What would you like us to know as we care for you? From home alone  - Provide timely, complete, and accurate information to patient/family  - Incorporate patient and family knowledge, values, beliefs, and cultural backgrounds into the planning and delivery of care  - Encourage patient/family to participate in care and decision-making at the level they choose  - Honor patient and family perspectives and choices  Outcome: Progressing     Problem: Diabetes/Glucose Control  Goal: Glucose maintained within prescribed range  Description: INTERVENTIONS:  - Monitor Blood Glucose as ordered  - Assess for signs and symptoms of hyperglycemia and hypoglycemia  - Administer ordered medications to maintain glucose within target range  - Assess barriers to adequate nutritional intake and initiate nutrition consult as needed  - Instruct patient on self management of diabetes  Outcome: Progressing     Problem: Patient/Family Goals  Goal: Patient/Family Long Term Goal  Description: Patient's Long Term Goal: determine source of pain    Interventions:  - imaging, microbiology  - See additional Care Plan goals for specific interventions  Outcome: Progressing  Goal: Patient/Family Short Term Goal  Description: Patient's Short Term Goal stop cough    Interventions:   - meds  - See additional Care Plan goals for specific interventions  Outcome: Progressing     Problem: PAIN - ADULT  Goal: Verbalizes/displays adequate comfort level or patient's stated pain goal  Description: INTERVENTIONS:  - Encourage pt to monitor pain and request assistance  - Assess pain using appropriate pain scale  - Administer analgesics based on type and severity of pain and evaluate response  - Implement  non-pharmacological measures as appropriate and evaluate response  - Consider cultural and social influences on pain and pain management  - Manage/alleviate anxiety  - Utilize distraction and/or relaxation techniques  - Monitor for opioid side effects  - Notify MD/LIP if interventions unsuccessful or patient reports new pain  - Anticipate increased pain with activity and pre-medicate as appropriate  Outcome: Progressing     Problem: METABOLIC/FLUID AND ELECTROLYTES - ADULT  Goal: Electrolytes maintained within normal limits  Description: INTERVENTIONS:  - Monitor labs and rhythm and assess patient for signs and symptoms of electrolyte imbalances  - Administer electrolyte replacement as ordered  - Monitor response to electrolyte replacements, including rhythm and repeat lab results as appropriate  - Fluid restriction as ordered  - Instruct patient on fluid and nutrition restrictions as appropriate  Outcome: Progressing  Goal: Hemodynamic stability and optimal renal function maintained  Description: INTERVENTIONS:  - Monitor labs and assess for signs and symptoms of volume excess or deficit  - Monitor intake, output and patient weight  - Monitor urine specific gravity, serum osmolarity and serum sodium as indicated or ordered  - Monitor response to interventions for patient's volume status, including labs, urine output, blood pressure (other measures as available)  - Encourage oral intake as appropriate  - Instruct patient on fluid and nutrition restrictions as appropriate  Outcome: Progressing

## 2024-05-19 NOTE — CONSULTS
[facility]  part of [facility]    Report of Consultation    Gorge Alejandre Patient Status:  Inpatient    1965 MRN [id number]   Location [facility] 5SW/SE Attending Francie Arizmendi,    Hosp Day # 4 PCP Jatinder Hein MD     Date of Admission:  5/15/2024  Date of Consult: 2024    Reason for Consultation:   Consults    History provided by: Pt  HPI:     Chief Complaint   Patient presents with    Abdomen/Flank Pain     HPI  Consult requested for dc nonfunctioning PD catheter. Dr. Stark placed the PD catheter in 2023. It was working well until pt developed an infection. Now the catheter is nonfunctional, no drainage. Dr. Arizmendi has asked for consultation for removal.     Gorge would like to continue with PD. We discussed timing of placing a new catheter after his infection is confirmed completely treated.       History     Past Medical History:    Blood disorder    Anemia    Colon polyp    repeat CLN in 5 years    Diabetes (HCC)    Diet control. Pt off from medications for 4 months now.    Diabetes mellitus (HCC)    Dialysis patient (HCC)    Pt with temporary cath on R chest with HD started 2023 with HD q MWF.    Essential hypertension    High blood pressure    High cholesterol    History of blood transfusion    No blood transfusion reaction    Hyperlipidemia    Renal disorder    Strabismus    OD XT    Thrombus    Per pt had (+) clot on R IJ (temp HD cath)     Past Surgical History:   Procedure Laterality Date    Cataract      Cataract extraction w/  intraocular lens implant Bilateral     Dr. OZZY Coleman    Colonoscopy N/A 3/2/2022    Procedure: COLONOSCOPY;  Surgeon: ALLAN Gaffney MD;  Location: [facility] ENDOSCOPY    Other surgical history      strabismus surgery    Strabismus surgery Bilateral     Vasectomy       Family History   Problem Relation Age of Onset    No Known Problems Father     Hypertension Mother     Diabetes Mother     Diabetes Brother      Hypertension Brother     Glaucoma Neg      Social History:  Social History     Socioeconomic History    Marital status: Single   Tobacco Use    Smoking status: Never     Passive exposure: Never    Smokeless tobacco: Never   Vaping Use    Vaping status: Never Used   Substance and Sexual Activity    Alcohol use: Yes     Comment: social use only.    Drug use: No   Other Topics Concern    Pt has a pacemaker No    Pt has a defibrillator No    Reaction to local anesthetic No     Social Determinants of Health     Financial Resource Strain: Low Risk  (8/8/2023)    Financial Resource Strain     Difficulty of Paying Living Expenses: Not very hard     Med Affordability: No   Food Insecurity: No Food Insecurity (5/15/2024)    Food Insecurity     Food Insecurity: Never true   Transportation Needs: No Transportation Needs (5/15/2024)    Transportation Needs     Lack of Transportation: No   Housing Stability: Low Risk  (5/15/2024)    Housing Stability     Housing Instability: No     Allergies/Medications:   Allergies:   Allergies   Allergen Reactions    Losartan Coughing     Tolerated in 2023     Medications Prior to Admission   Medication Sig    sevelamer carbonate 800 MG Oral Tab Take 3 tablets (2,400 mg total) by mouth 3 (three) times daily with meals.    metoprolol tartrate 100 MG Oral Tab Take 1 tablet (100 mg total) by mouth daily.    metoprolol tartrate 25 MG Oral Tab Take 1 tablet (25 mg total) by mouth daily.    amLODIPine 10 MG Oral Tab Take 1 tablet (10 mg total) by mouth daily. (Patient taking differently: Take 1 tablet (10 mg total) by mouth every morning.)    atorvastatin 80 MG Oral Tab Take 1 tablet (80 mg total) by mouth nightly.    apixaban 5 MG Oral Tab Take 1 tablet (5 mg total) by mouth 2 (two) times daily. (Patient taking differently: Take 1 tablet (5 mg total) by mouth daily.)    VITAMIN D, CHOLECALCIFEROL, OR Take 1 capsule by mouth twice a week.       Review of Systems:   Review of Systems    Review of  Systems:   GENERAL: feels generally well  SKIN: no ulcerated or worrisome skin lesions  EYES:denies blurred vision or double vision  HEENT: denies new nasal congestion, sinus pain or ST  LUNGS: denies shortness of breath with exertion  CARDIOVASCULAR: denies chest pain on exertion  GI: no hematemesis, no BRBPR, no worsening heartburn  : no dysuria, no blood in urine, no difficulty urinating  MUSCULOSKELETAL: no new musculoskeletal complaints  NEURO: no persistent, recurrent  headaches  PSYCHE:no depression or anxiety  HEMATOLOGIC: no hx of blood dyscrasia  ENDOCRINE: no new endocrine problems  ALL/ASTHMA: no new hx of severe allergy or asthma  BACK: normal, no spinal deformity, no CVA tenderness      Physical Exam:   Vital Signs:   weight is 187 lb 14.4 oz (85.2 kg). His oral temperature is 99.1 °F (37.3 °C). His blood pressure is 151/89 and his pulse is 86. His respiration is 16 and oxygen saturation is 96%.   Physical Exam  Alert, NAD  HEENT wnl, anicteric, PERRL  Neck supple, norm ROM, no JVD  L Nonlabored resp   H Reg rate  Abd soft, NT, ND, PD cath present, no discharge at site, no erythema, no fluctuance.   Extr no c/c/e  Skin intact, no jaundice, no rashes, no lesions  Neuro grossly intact, no focal deficits, no tremors  Back no deformity, no CVA tnd.    Results:     Lab Results   Component Value Date    WBC 11.9 (H) 05/19/2024    HGB 8.8 (L) 05/19/2024    HCT 24.2 (L) 05/19/2024    .0 05/19/2024    CREATSERUM 11.79 (H) 05/19/2024    BUN 54 (H) 05/19/2024     (L) 05/19/2024    K 2.8 (LL) 05/19/2024    CL 96 (L) 05/19/2024    CO2 27.0 05/19/2024     (H) 05/19/2024    CA 8.0 (L) 05/19/2024    ALB 4.0 05/15/2024    ALKPHO 119 (H) 05/15/2024    BILT 0.5 05/15/2024    TP 7.7 05/15/2024    AST 45 (H) 05/15/2024    ALT 54 (H) 05/15/2024    TSH 1.870 08/01/2023    PSA 0.9 02/10/2017    CRP <0.29 08/01/2023    MG 2.0 09/08/2023    PHOS 5.0 05/15/2024    B12 585 08/01/2023     XR ABDOMEN (1 VIEW)  (CPT=74018)    Result Date: 5/17/2024  CONCLUSION: Peritoneal dialysis catheter enters from the left lower quadrant with distal end coiled in the right lower quadrant.  No fold, disconnect, or kink along the tract of the PD catheter.  Soft tissue emphysema around the entry point of the catheter likely related to manipulation or recent placement.   Dictated by (CST): Johnathan Blanc MD on 5/17/2024 at 8:13 PM     Finalized by (CST): Johnathan Blanc MD on 5/17/2024 at 8:15 PM               Impression:     Abdominal pain, acute        Peritoneal dialysis catheter in place (HCC), nonfunctioning        SBP (spontaneous bacterial peritonitis) (HCC)        Secondary hyperparathyroidism (HCC)        ESRD (end stage renal disease) on dialysis (HCC)        Peritonitis (HCC)           Recommendations:  Plan dc PD catheter. Due to hypokalemia, will consider procedure under local anesthesia. Continue abx. Hold am heparin.   PT/INR PTT pending  K 2.5      We have discussed the surgical risks, benefits, alternatives, and expected recovery. All of the patient's questions have been answered to his satisfaction.    Annetta Willis MD  5/19/2024

## 2024-05-19 NOTE — PROGRESS NOTES
[facility]  part of [facility]    Progress Note    Gorge Alejandre Patient Status:  Inpatient    1965 MRN [id number]   Location [facility] 5SW/SE Attending Francie Arizmendi, DO   Hosp Day # 4 PCP Jatinder Hein MD     Chief complaint abd pain     Subjective:   Gorge Alejandre is a(n) 58 year old male Pt doing well this am. Reports abd feels better      ROS:   No cp, sob   No c/d   No n/v     Objective:   Blood pressure 151/89, pulse 86, temperature 99.1 °F (37.3 °C), temperature source Oral, resp. rate 16, weight 187 lb 14.4 oz (85.2 kg), SpO2 96%.    No intake or output data in the 24 hours ending 24 1407      Patient Weight(s) for the past 336 hrs:   Weight   24 0709 187 lb 14.4 oz (85.2 kg)   24 0601 194 lb 1.6 oz (88 kg)   24 1400 189 lb 6.4 oz (85.9 kg)   05/15/24 1907 178 lb 9.6 oz (81 kg)   05/15/24 1010 183 lb (83 kg)           General appearance: alert, appears stated age and cooperative  Pulmonary:  clear to auscultation bilaterally  Cardiovascular: S1, S2 normal, no murmur, click, rub or gallop, regular rate and rhythm  Abdominal: soft, tender in lower abd, pd catheter in place , no erythema   Extremities: extremities normal, atraumatic, no cyanosis or edema        Medicines:     Current Facility-Administered Medications   Medication Dose Route Frequency    heparin (Porcine) 5000 UNIT/ML injection 5,000 Units  5,000 Units Subcutaneous Q8H KHOA    epoetin anuja (Epogen, Procrit) 5,000 Units injection  5,000 Units Subcutaneous Once per day on     dextrose 1.5%-calcium 2.5 mEq/L peritoneal solution  5,000 mL Intraperitoneal Once in dialysis    insulin aspart (NovoLOG) 100 Units/mL FlexPen 1-7 Units  1-7 Units Subcutaneous TID CC and HS    glucose (Dex4) 15 GM/59ML oral liquid 15 g  15 g Oral Q15 Min PRN    Or    glucose (Glutose) 40% oral gel 15 g  15 g Oral Q15 Min PRN    Or    glucose-vitamin C (Dex-4) chewable tab 4 tablet   4 tablet Oral Q15 Min PRN    Or    dextrose 50% injection 50 mL  50 mL Intravenous Q15 Min PRN    Or    glucose (Dex4) 15 GM/59ML oral liquid 30 g  30 g Oral Q15 Min PRN    Or    glucose (Glutose) 40% oral gel 30 g  30 g Oral Q15 Min PRN    Or    glucose-vitamin C (Dex-4) chewable tab 8 tablet  8 tablet Oral Q15 Min PRN    acetaminophen (Tylenol Extra Strength) tab 500 mg  500 mg Oral Q4H PRN    ondansetron (Zofran) 4 MG/2ML injection 4 mg  4 mg Intravenous Q6H PRN    prochlorperazine (Compazine) 10 MG/2ML injection 5 mg  5 mg Intravenous Q8H PRN    HYDROmorphone (Dilaudid) 1 MG/ML injection 0.2 mg  0.2 mg Intravenous Q2H PRN    Or    HYDROmorphone (Dilaudid) 1 MG/ML injection 0.4 mg  0.4 mg Intravenous Q2H PRN    Or    HYDROmorphone (Dilaudid) 1 MG/ML injection 0.8 mg  0.8 mg Intravenous Q2H PRN    acetaminophen (Tylenol) tab 650 mg  650 mg Oral Q4H PRN    Or    HYDROcodone-acetaminophen (Norco) 5-325 MG per tab 1 tablet  1 tablet Oral Q4H PRN    Or    HYDROcodone-acetaminophen (Norco) 5-325 MG per tab 2 tablet  2 tablet Oral Q4H PRN    temazepam (Restoril) cap 15 mg  15 mg Oral Nightly PRN    Vancomycin: PHARMACY DOSING  1 each Intravenous See Admin Instructions (RX holding)    guaiFENesin (Robitussin) 100 MG/5 ML oral liquid 200 mg  200 mg Oral Q4H PRN    amLODIPine (Norvasc) tab 10 mg  10 mg Oral QAM    atorvastatin (Lipitor) tab 80 mg  80 mg Oral Nightly    metoprolol succinate ER (Toprol XL) 24 hr tab 100 mg  100 mg Oral Daily    metoprolol succinate ER (Toprol XL) 24 hr tab 25 mg  25 mg Oral Daily    sevelamer carbonate (Renvela) tab 2,400 mg  2,400 mg Oral TID CC    dextrose 1.5%-calcium 2.5 mEq/L peritoneal solution  5,000 mL Intraperitoneal Once in dialysis    benzocaine-menthol (Cepacol) lozenge 1 lozenge  1 lozenge Oral PRN    benzonatate (Tessalon) cap 100 mg  100 mg Oral TID PRN       Lab Results   Component Value Date    WBC 11.9 (H) 05/19/2024    HGB 8.8 (L) 05/19/2024    HCT 24.2 (L) 05/19/2024     .0 05/19/2024    CREATSERUM 11.79 (H) 05/19/2024    BUN 54 (H) 05/19/2024     (L) 05/19/2024    K 2.8 (LL) 05/19/2024    CL 96 (L) 05/19/2024    CO2 27.0 05/19/2024     (H) 05/19/2024    CA 8.0 (L) 05/19/2024    ALB 4.0 05/15/2024    ALKPHO 119 (H) 05/15/2024    BILT 0.5 05/15/2024    TP 7.7 05/15/2024    AST 45 (H) 05/15/2024    ALT 54 (H) 05/15/2024    PTT 39.9 (H) 05/19/2024    INR 1.07 05/19/2024    TSH 1.870 08/01/2023    PSA 0.9 02/10/2017    CRP <0.29 08/01/2023    MG 2.0 09/08/2023    PHOS 5.0 05/15/2024    B12 585 08/01/2023       XR ABDOMEN (1 VIEW) (CPT=74018)    Result Date: 5/17/2024  CONCLUSION: Peritoneal dialysis catheter enters from the left lower quadrant with distal end coiled in the right lower quadrant.  No fold, disconnect, or kink along the tract of the PD catheter.  Soft tissue emphysema around the entry point of the catheter likely related to manipulation or recent placement.   Dictated by (CST): Johnathan Blanc MD on 5/17/2024 at 8:13 PM     Finalized by (CST): Johnathan Blanc MD on 5/17/2024 at 8:15 PM               Results:     CBC:    Lab Results   Component Value Date    WBC 11.9 (H) 05/19/2024    WBC 12.8 (H) 05/18/2024    WBC 9.2 05/17/2024     Lab Results   Component Value Date    HGB 8.8 (L) 05/19/2024    HGB 9.5 (L) 05/18/2024    HGB 10.1 (L) 05/17/2024      Lab Results   Component Value Date    .0 05/19/2024    .0 05/18/2024    .0 05/17/2024       Recent Labs   Lab 05/17/24  0624 05/18/24  1020 05/19/24  0639   * 107* 117*   BUN 34* 51* 54*   CREATSERUM 9.91* 11.12* 11.79*   CA 8.1* 8.0* 8.0*   * 132* 130*   K 2.8* 3.0* 2.8*   CL 95* 97* 96*   CO2 27.0 24.0 27.0             Assessment and Plan:      ASSESSMENT AND PLAN:    1.       Clinical presentation suggestive of possible underlying spontaneous bacterial peritonitis.    - apprec ID and Renal   - cont iv cefepime and vanco   - await final cxs, gpc clusters in fluid - staph aureus    - blood cxs neg to date   - plan PD catheter removal. Plan surgery consult - apprec input     2.       End-stage renal disease, on peritoneal dialysis for last 6 mo's. Transitioned from HD  - PD catheter no longer working so will need it removed. Consult Surgery Dr Willis. Apprec input     3.       Diabetes mellitus type 2.  A1c 6.9   - accu checks ac and hs    - controlled     4. Right internal jugular dvt - on eliquis since pos doppler in sep - pt reports he has been off of it. Will stop     5. HTN - cont metoprolol and amlodipine    Dvt ppx heparin              Francie Arizmendi, DO         Chart reviewed, including current vitals, notes, labs and imaging  Labs ordered and medications adjusted as outlined above  Coordinate care with care team/consultants  Discussed with patient results of tests, management plan as outlined above, and the need for ongoing hospitalization  D/w RN     MDM high

## 2024-05-19 NOTE — PLAN OF CARE
No acute changes noted throughout shift. Call light within reach. Calls appropriately.     Problem: Patient Centered Care  Goal: Patient preferences are identified and integrated in the patient's plan of care  Description: Interventions:  - What would you like us to know as we care for you? From home alone  - Provide timely, complete, and accurate information to patient/family  - Incorporate patient and family knowledge, values, beliefs, and cultural backgrounds into the planning and delivery of care  - Encourage patient/family to participate in care and decision-making at the level they choose  - Honor patient and family perspectives and choices  Outcome: Progressing     Problem: Diabetes/Glucose Control  Goal: Glucose maintained within prescribed range  Description: INTERVENTIONS:  - Monitor Blood Glucose as ordered  - Assess for signs and symptoms of hyperglycemia and hypoglycemia  - Administer ordered medications to maintain glucose within target range  - Assess barriers to adequate nutritional intake and initiate nutrition consult as needed  - Instruct patient on self management of diabetes  Outcome: Progressing     Problem: Patient/Family Goals  Goal: Patient/Family Long Term Goal  Description: Patient's Long Term Goal: determine source of pain    Interventions:  - imaging, microbiology  - See additional Care Plan goals for specific interventions  Outcome: Progressing  Goal: Patient/Family Short Term Goal  Description: Patient's Short Term Goal stop cough    Interventions:   - meds  - See additional Care Plan goals for specific interventions  Outcome: Progressing     Problem: PAIN - ADULT  Goal: Verbalizes/displays adequate comfort level or patient's stated pain goal  Description: INTERVENTIONS:  - Encourage pt to monitor pain and request assistance  - Assess pain using appropriate pain scale  - Administer analgesics based on type and severity of pain and evaluate response  - Implement non-pharmacological  measures as appropriate and evaluate response  - Consider cultural and social influences on pain and pain management  - Manage/alleviate anxiety  - Utilize distraction and/or relaxation techniques  - Monitor for opioid side effects  - Notify MD/LIP if interventions unsuccessful or patient reports new pain  - Anticipate increased pain with activity and pre-medicate as appropriate  Outcome: Progressing     Problem: METABOLIC/FLUID AND ELECTROLYTES - ADULT  Goal: Electrolytes maintained within normal limits  Description: INTERVENTIONS:  - Monitor labs and rhythm and assess patient for signs and symptoms of electrolyte imbalances  - Administer electrolyte replacement as ordered  - Monitor response to electrolyte replacements, including rhythm and repeat lab results as appropriate  - Fluid restriction as ordered  - Instruct patient on fluid and nutrition restrictions as appropriate  Outcome: Progressing  Goal: Hemodynamic stability and optimal renal function maintained  Description: INTERVENTIONS:  - Monitor labs and assess for signs and symptoms of volume excess or deficit  - Monitor intake, output and patient weight  - Monitor urine specific gravity, serum osmolarity and serum sodium as indicated or ordered  - Monitor response to interventions for patient's volume status, including labs, urine output, blood pressure (other measures as available)  - Encourage oral intake as appropriate  - Instruct patient on fluid and nutrition restrictions as appropriate  Outcome: Progressing

## 2024-05-19 NOTE — PROGRESS NOTES
[facility]  part of [facility]    Nephrology Progress Note    Chief Complaint   Patient presents with    Abdomen/Flank Pain       Subjective:   58 year old male, following for ESRD on PD.     Denies abdominal pain.    Review of Systems:   Review of Systems   Constitutional:  Negative for chills, fatigue and fever.   Respiratory:  Negative for cough and shortness of breath.    Cardiovascular:  Negative for chest pain and leg swelling.   Gastrointestinal:  Positive for abdominal pain. Negative for constipation, diarrhea, nausea and vomiting.   Genitourinary:  Negative for difficulty urinating, dysuria and flank pain.       Objective:   Temp:  [98.8 °F (37.1 °C)-99.6 °F (37.6 °C)] 99.1 °F (37.3 °C)  Pulse:  [81-89] 86  Resp:  [16-18] 16  BP: (138-155)/(78-89) 151/89  SpO2:  [95 %-97 %] 96 %  SpO2: 96 %   No intake or output data in the 24 hours ending 05/19/24 1159    Wt Readings from Last 3 Encounters:   05/19/24 187 lb 14.4 oz (85.2 kg)   12/12/23 195 lb (88.5 kg)   12/08/23 195 lb (88.5 kg)     Physical Exam  Constitutional:       Appearance: Normal appearance.   Cardiovascular:      Rate and Rhythm: Normal rate and regular rhythm.      Heart sounds: Normal heart sounds.   Pulmonary:      Effort: Pulmonary effort is normal.      Breath sounds: Normal breath sounds.   Abdominal:      Comments: PD catheter-neg erythema   Musculoskeletal:         General: Normal range of motion.   Skin:     General: Skin is warm and dry.   Neurological:      General: No focal deficit present.      Mental Status: He is alert and oriented to person, place, and time.   Psychiatric:         Mood and Affect: Mood normal.         Behavior: Behavior normal.         Medications:  Current Facility-Administered Medications   Medication Dose Route Frequency    heparin (Porcine) 5000 UNIT/ML injection 5,000 Units  5,000 Units Subcutaneous Q8H KHOA    epoetin anuja (Epogen, Procrit) 5,000 Units injection  5,000 Units Subcutaneous  Once per day on Monday Wednesday Friday    dextrose 1.5%-calcium 2.5 mEq/L peritoneal solution  5,000 mL Intraperitoneal Once in dialysis    insulin aspart (NovoLOG) 100 Units/mL FlexPen 1-7 Units  1-7 Units Subcutaneous TID CC and HS    glucose (Dex4) 15 GM/59ML oral liquid 15 g  15 g Oral Q15 Min PRN    Or    glucose (Glutose) 40% oral gel 15 g  15 g Oral Q15 Min PRN    Or    glucose-vitamin C (Dex-4) chewable tab 4 tablet  4 tablet Oral Q15 Min PRN    Or    dextrose 50% injection 50 mL  50 mL Intravenous Q15 Min PRN    Or    glucose (Dex4) 15 GM/59ML oral liquid 30 g  30 g Oral Q15 Min PRN    Or    glucose (Glutose) 40% oral gel 30 g  30 g Oral Q15 Min PRN    Or    glucose-vitamin C (Dex-4) chewable tab 8 tablet  8 tablet Oral Q15 Min PRN    acetaminophen (Tylenol Extra Strength) tab 500 mg  500 mg Oral Q4H PRN    ondansetron (Zofran) 4 MG/2ML injection 4 mg  4 mg Intravenous Q6H PRN    prochlorperazine (Compazine) 10 MG/2ML injection 5 mg  5 mg Intravenous Q8H PRN    HYDROmorphone (Dilaudid) 1 MG/ML injection 0.2 mg  0.2 mg Intravenous Q2H PRN    Or    HYDROmorphone (Dilaudid) 1 MG/ML injection 0.4 mg  0.4 mg Intravenous Q2H PRN    Or    HYDROmorphone (Dilaudid) 1 MG/ML injection 0.8 mg  0.8 mg Intravenous Q2H PRN    acetaminophen (Tylenol) tab 650 mg  650 mg Oral Q4H PRN    Or    HYDROcodone-acetaminophen (Norco) 5-325 MG per tab 1 tablet  1 tablet Oral Q4H PRN    Or    HYDROcodone-acetaminophen (Norco) 5-325 MG per tab 2 tablet  2 tablet Oral Q4H PRN    temazepam (Restoril) cap 15 mg  15 mg Oral Nightly PRN    Vancomycin: PHARMACY DOSING  1 each Intravenous See Admin Instructions (RX holding)    guaiFENesin (Robitussin) 100 MG/5 ML oral liquid 200 mg  200 mg Oral Q4H PRN    amLODIPine (Norvasc) tab 10 mg  10 mg Oral QAM    atorvastatin (Lipitor) tab 80 mg  80 mg Oral Nightly    metoprolol succinate ER (Toprol XL) 24 hr tab 100 mg  100 mg Oral Daily    metoprolol succinate ER (Toprol XL) 24 hr tab 25 mg  25 mg  Oral Daily    sevelamer carbonate (Renvela) tab 2,400 mg  2,400 mg Oral TID CC    dextrose 1.5%-calcium 2.5 mEq/L peritoneal solution  5,000 mL Intraperitoneal Once in dialysis    benzocaine-menthol (Cepacol) lozenge 1 lozenge  1 lozenge Oral PRN    benzonatate (Tessalon) cap 100 mg  100 mg Oral TID PRN              Results:     Recent Labs   Lab 05/17/24  0624 05/18/24  1020 05/19/24  0639   RBC 3.17* 2.91* 2.69*   HGB 10.1* 9.5* 8.8*   HCT 29.0* 26.2* 24.2*   MCV 91.5 90.0 90.0   NEPRELIM 7.10 10.43* 9.53*   WBC 9.2 12.8* 11.9*   .0 371.0 358.0     Recent Labs   Lab 05/15/24  1110 05/16/24  0638 05/17/24  0624 05/18/24  1020 05/19/24  0639   *   < > 107* 107* 117*   BUN 32*   < > 34* 51* 54*   CREATSERUM 9.62*   < > 9.91* 11.12* 11.79*   CA 8.5*   < > 8.1* 8.0* 8.0*   ALB 4.0  --   --   --   --    *   < > 132* 132* 130*   K 2.7*   < > 2.8* 3.0* 2.8*   CL 92*   < > 95* 97* 96*   CO2 30.0   < > 27.0 24.0 27.0   ALKPHO 119*  --   --   --   --    AST 45*  --   --   --   --    ALT 54*  --   --   --   --    BILT 0.5  --   --   --   --    TP 7.7  --   --   --   --     < > = values in this interval not displayed.     PTT   Date Value Ref Range Status   05/19/2024 39.9 (H) 23.0 - 36.0 seconds Final     Comment:       The aPTT Heparin Therapeutic Range is approximately 65- 104 seconds. The therapeutic range has been validated against 0.3-0.7 heparin anti-Xa units/mL.     Elevations of the aPTT in patients not receiving anticoagulant therapy (Heparin, etc.), may be seen in Factor deficiency, vitamin K deficiency, factor inhibitors, liver disease, etc.   Clinical correlation is recommended.          INR   Date Value Ref Range Status   05/19/2024 1.07 0.80 - 1.20 Final     Comment:     Therapeutic INR range for patients on warfarin:  2.0-3.0 for most medical conditions and surgical prophylaxis   2.5-3.5 for mechanical heart valves and recurrent thromboembolism    Direct thrombin inhibitors (e.g. argatroban,  bivalirudin), factor Xa inhibitors (e.g. apixaban, rivaroxaban), and conditions such as coagulation factor deficiency, vitamin K deficiency, and liver disease will prolong the prothrombin time/INR.    Unfractionated heparin and low molecular weight heparin do not affect the prothrombin time/INR up to a concentration of 1 IU/mL and 1.5 IU/mL, respectively.         No results for input(s): \"BNP\" in the last 168 hours.  Recent Labs   Lab 05/15/24  1110   PHOS 5.0        Recent Labs   Lab 05/15/24  1110   PHOS 5.0   ALB 4.0           Assessment and Plan:     Patient is a 58 year old male with past medical history of T2DM, HTN, HLD, and ESRD on peritoneal dialysis, who presented for abdominal pain. Found to have PD catheter related peritonitis.     Peritonitis:  Cell count elevated at 2700 and fluid positive for Staph aureus.   On IV vancomycin. Cefepime dced.   Repeat cell count and culture ordered. However, fluid is not draining and not able to get a sample.     ESRD on PD:   He will need PD catheter removal since it is not working. Will hold PD tonight.   For TDC on Monday and will need outpatient HD set [facility] [place].     Hyperphosphatemia:   Cont Renvela 3 tabs with meals.     Hypokalemia:   Potassium 2.8 today. Potassium chloride 20 meq x1 ordered.      HTN:   Cont with current meds.         Angela Hernandez MD  5/19/2024

## 2024-05-20 ENCOUNTER — APPOINTMENT (OUTPATIENT)
Dept: INTERVENTIONAL RADIOLOGY/VASCULAR | Facility: HOSPITAL | Age: 59
DRG: 907 | End: 2024-05-20
Attending: INTERNAL MEDICINE

## 2024-05-20 ENCOUNTER — ANESTHESIA (OUTPATIENT)
Dept: SURGERY | Facility: HOSPITAL | Age: 59
DRG: 907 | End: 2024-05-20

## 2024-05-20 ENCOUNTER — ANESTHESIA EVENT (OUTPATIENT)
Dept: SURGERY | Facility: HOSPITAL | Age: 59
DRG: 907 | End: 2024-05-20

## 2024-05-20 LAB
ALBUMIN SERPL-MCNC: 3.6 G/DL (ref 3.2–4.8)
ALBUMIN/GLOB SERPL: 1 {RATIO} (ref 1–2)
ALP LIVER SERPL-CCNC: 271 U/L
ALT SERPL-CCNC: 97 U/L
ANION GAP SERPL CALC-SCNC: 11 MMOL/L (ref 0–18)
AST SERPL-CCNC: 60 U/L (ref ?–34)
BASOPHILS # BLD AUTO: 0.04 X10(3) UL (ref 0–0.2)
BASOPHILS NFR BLD AUTO: 0.3 %
BILIRUB SERPL-MCNC: 0.2 MG/DL (ref 0.3–1.2)
BUN BLD-MCNC: 63 MG/DL (ref 9–23)
BUN/CREAT SERPL: 5.1 (ref 10–20)
CALCIUM BLD-MCNC: 8.2 MG/DL (ref 8.7–10.4)
CHLORIDE SERPL-SCNC: 96 MMOL/L (ref 98–112)
CO2 SERPL-SCNC: 26 MMOL/L (ref 21–32)
CREAT BLD-MCNC: 12.42 MG/DL
DEPRECATED RDW RBC AUTO: 41 FL (ref 35.1–46.3)
EGFRCR SERPLBLD CKD-EPI 2021: 4 ML/MIN/1.73M2 (ref 60–?)
EOSINOPHIL # BLD AUTO: 0.27 X10(3) UL (ref 0–0.7)
EOSINOPHIL NFR BLD AUTO: 2 %
ERYTHROCYTE [DISTWIDTH] IN BLOOD BY AUTOMATED COUNT: 12.6 % (ref 11–15)
GLOBULIN PLAS-MCNC: 3.5 G/DL (ref 2–3.5)
GLUCOSE BLD-MCNC: 101 MG/DL (ref 70–99)
GLUCOSE BLDC GLUCOMTR-MCNC: 104 MG/DL (ref 70–99)
GLUCOSE BLDC GLUCOMTR-MCNC: 113 MG/DL (ref 70–99)
GLUCOSE BLDC GLUCOMTR-MCNC: 116 MG/DL (ref 70–99)
GLUCOSE BLDC GLUCOMTR-MCNC: 118 MG/DL (ref 70–99)
GLUCOSE BLDC GLUCOMTR-MCNC: 153 MG/DL (ref 70–99)
HBV CORE AB SERPL QL IA: NONREACTIVE
HBV SURFACE AB SER QL: REACTIVE
HBV SURFACE AB SERPL IA-ACNC: 98.43 MIU/ML
HBV SURFACE AG SER-ACNC: <0.1 [IU]/L
HBV SURFACE AG SERPL QL IA: NONREACTIVE
HCT VFR BLD AUTO: 26.4 %
HGB BLD-MCNC: 9.5 G/DL
IMM GRANULOCYTES # BLD AUTO: 0.37 X10(3) UL (ref 0–1)
IMM GRANULOCYTES NFR BLD: 2.7 %
IRON SATN MFR SERPL: 39 %
IRON SERPL-MCNC: 39 UG/DL
LYMPHOCYTES # BLD AUTO: 1.09 X10(3) UL (ref 1–4)
LYMPHOCYTES NFR BLD AUTO: 8.1 %
MAGNESIUM SERPL-MCNC: 1.8 MG/DL (ref 1.6–2.6)
MCH RBC QN AUTO: 32 PG (ref 26–34)
MCHC RBC AUTO-ENTMCNC: 36 G/DL (ref 31–37)
MCV RBC AUTO: 88.9 FL
MONOCYTES # BLD AUTO: 0.84 X10(3) UL (ref 0.1–1)
MONOCYTES NFR BLD AUTO: 6.2 %
NEUTROPHILS # BLD AUTO: 10.9 X10 (3) UL (ref 1.5–7.7)
NEUTROPHILS # BLD AUTO: 10.9 X10(3) UL (ref 1.5–7.7)
NEUTROPHILS NFR BLD AUTO: 80.7 %
OSMOLALITY SERPL CALC.SUM OF ELEC: 294 MOSM/KG (ref 275–295)
PHOSPHATE SERPL-MCNC: 2.8 MG/DL (ref 2.4–5.1)
PLATELET # BLD AUTO: 408 10(3)UL (ref 150–450)
POTASSIUM SERPL-SCNC: 3.2 MMOL/L (ref 3.5–5.1)
PROT SERPL-MCNC: 7.1 G/DL (ref 5.7–8.2)
RBC # BLD AUTO: 2.97 X10(6)UL
SODIUM SERPL-SCNC: 133 MMOL/L (ref 136–145)
TIBC SERPL-MCNC: 101 UG/DL (ref 250–425)
TRANSFERRIN SERPL-MCNC: 68 MG/DL (ref 215–365)
WBC # BLD AUTO: 13.5 X10(3) UL (ref 4–11)

## 2024-05-20 PROCEDURE — 0WPG03Z REMOVAL OF INFUSION DEVICE FROM PERITONEAL CAVITY, OPEN APPROACH: ICD-10-PCS | Performed by: SURGERY

## 2024-05-20 PROCEDURE — 99233 SBSQ HOSP IP/OBS HIGH 50: CPT | Performed by: HOSPITALIST

## 2024-05-20 PROCEDURE — 99233 SBSQ HOSP IP/OBS HIGH 50: CPT | Performed by: INTERNAL MEDICINE

## 2024-05-20 PROCEDURE — 02H633Z INSERTION OF INFUSION DEVICE INTO RIGHT ATRIUM, PERCUTANEOUS APPROACH: ICD-10-PCS | Performed by: STUDENT IN AN ORGANIZED HEALTH CARE EDUCATION/TRAINING PROGRAM

## 2024-05-20 PROCEDURE — 49422 REMOVE TUNNELED IP CATH: CPT | Performed by: SURGERY

## 2024-05-20 PROCEDURE — 0JH63XZ INSERTION OF TUNNELED VASCULAR ACCESS DEVICE INTO CHEST SUBCUTANEOUS TISSUE AND FASCIA, PERCUTANEOUS APPROACH: ICD-10-PCS | Performed by: STUDENT IN AN ORGANIZED HEALTH CARE EDUCATION/TRAINING PROGRAM

## 2024-05-20 RX ORDER — POTASSIUM CHLORIDE 20 MEQ/1
20 TABLET, EXTENDED RELEASE ORAL ONCE
Status: COMPLETED | OUTPATIENT
Start: 2024-05-20 | End: 2024-05-20

## 2024-05-20 RX ORDER — LIDOCAINE HYDROCHLORIDE 20 MG/ML
INJECTION, SOLUTION INFILTRATION; PERINEURAL
Status: COMPLETED
Start: 2024-05-20 | End: 2024-05-20

## 2024-05-20 RX ORDER — HEPARIN SODIUM (PORCINE) LOCK FLUSH IV SOLN 100 UNIT/ML 100 UNIT/ML
1.5 SOLUTION INTRAVENOUS ONCE
Status: DISCONTINUED | OUTPATIENT
Start: 2024-05-20 | End: 2024-05-20 | Stop reason: HOSPADM

## 2024-05-20 RX ORDER — ALBUMIN (HUMAN) 12.5 G/50ML
25 SOLUTION INTRAVENOUS
Status: ACTIVE | OUTPATIENT
Start: 2024-05-20 | End: 2024-05-22

## 2024-05-20 RX ORDER — NALOXONE HYDROCHLORIDE 0.4 MG/ML
80 INJECTION, SOLUTION INTRAMUSCULAR; INTRAVENOUS; SUBCUTANEOUS AS NEEDED
Status: DISCONTINUED | OUTPATIENT
Start: 2024-05-20 | End: 2024-05-20 | Stop reason: HOSPADM

## 2024-05-20 RX ORDER — HYDROMORPHONE HYDROCHLORIDE 1 MG/ML
0.4 INJECTION, SOLUTION INTRAMUSCULAR; INTRAVENOUS; SUBCUTANEOUS EVERY 5 MIN PRN
Status: DISCONTINUED | OUTPATIENT
Start: 2024-05-20 | End: 2024-05-20 | Stop reason: HOSPADM

## 2024-05-20 RX ORDER — HEPARIN SODIUM 1000 [USP'U]/ML
INJECTION, SOLUTION INTRAVENOUS; SUBCUTANEOUS
Status: COMPLETED
Start: 2024-05-20 | End: 2024-05-20

## 2024-05-20 RX ORDER — ROCURONIUM BROMIDE 10 MG/ML
INJECTION, SOLUTION INTRAVENOUS AS NEEDED
Status: DISCONTINUED | OUTPATIENT
Start: 2024-05-20 | End: 2024-05-20 | Stop reason: SURG

## 2024-05-20 RX ORDER — LIDOCAINE HYDROCHLORIDE 10 MG/ML
INJECTION, SOLUTION EPIDURAL; INFILTRATION; INTRACAUDAL; PERINEURAL AS NEEDED
Status: DISCONTINUED | OUTPATIENT
Start: 2024-05-20 | End: 2024-05-20 | Stop reason: SURG

## 2024-05-20 RX ORDER — HYDROCODONE BITARTRATE AND ACETAMINOPHEN 5; 325 MG/1; MG/1
1 TABLET ORAL EVERY 6 HOURS PRN
Status: DISCONTINUED | OUTPATIENT
Start: 2024-05-20 | End: 2024-05-20

## 2024-05-20 RX ORDER — MORPHINE SULFATE 4 MG/ML
4 INJECTION, SOLUTION INTRAMUSCULAR; INTRAVENOUS EVERY 10 MIN PRN
Status: DISCONTINUED | OUTPATIENT
Start: 2024-05-20 | End: 2024-05-20 | Stop reason: HOSPADM

## 2024-05-20 RX ORDER — SODIUM CHLORIDE, SODIUM LACTATE, POTASSIUM CHLORIDE, CALCIUM CHLORIDE 600; 310; 30; 20 MG/100ML; MG/100ML; MG/100ML; MG/100ML
INJECTION, SOLUTION INTRAVENOUS CONTINUOUS
Status: DISCONTINUED | OUTPATIENT
Start: 2024-05-20 | End: 2024-05-20 | Stop reason: HOSPADM

## 2024-05-20 RX ORDER — HYDROMORPHONE HYDROCHLORIDE 1 MG/ML
0.6 INJECTION, SOLUTION INTRAMUSCULAR; INTRAVENOUS; SUBCUTANEOUS EVERY 5 MIN PRN
Status: DISCONTINUED | OUTPATIENT
Start: 2024-05-20 | End: 2024-05-20 | Stop reason: HOSPADM

## 2024-05-20 RX ORDER — MORPHINE SULFATE 4 MG/ML
2 INJECTION, SOLUTION INTRAMUSCULAR; INTRAVENOUS EVERY 10 MIN PRN
Status: DISCONTINUED | OUTPATIENT
Start: 2024-05-20 | End: 2024-05-20 | Stop reason: HOSPADM

## 2024-05-20 RX ORDER — GLYCOPYRROLATE 0.2 MG/ML
INJECTION, SOLUTION INTRAMUSCULAR; INTRAVENOUS AS NEEDED
Status: DISCONTINUED | OUTPATIENT
Start: 2024-05-20 | End: 2024-05-20 | Stop reason: SURG

## 2024-05-20 RX ORDER — NEOSTIGMINE METHYLSULFATE 1 MG/ML
INJECTION, SOLUTION INTRAVENOUS AS NEEDED
Status: DISCONTINUED | OUTPATIENT
Start: 2024-05-20 | End: 2024-05-20 | Stop reason: SURG

## 2024-05-20 RX ORDER — SODIUM CHLORIDE 9 MG/ML
INJECTION, SOLUTION INTRAVENOUS CONTINUOUS PRN
Status: DISCONTINUED | OUTPATIENT
Start: 2024-05-20 | End: 2024-05-20 | Stop reason: SURG

## 2024-05-20 RX ORDER — HEPARIN SODIUM 1000 [USP'U]/ML
1.5 INJECTION, SOLUTION INTRAVENOUS; SUBCUTANEOUS
Status: DISCONTINUED | OUTPATIENT
Start: 2024-05-20 | End: 2024-05-22

## 2024-05-20 RX ORDER — MIDAZOLAM HYDROCHLORIDE 1 MG/ML
INJECTION INTRAMUSCULAR; INTRAVENOUS
Status: COMPLETED
Start: 2024-05-20 | End: 2024-05-20

## 2024-05-20 RX ORDER — MORPHINE SULFATE 10 MG/ML
6 INJECTION, SOLUTION INTRAMUSCULAR; INTRAVENOUS EVERY 10 MIN PRN
Status: DISCONTINUED | OUTPATIENT
Start: 2024-05-20 | End: 2024-05-20 | Stop reason: HOSPADM

## 2024-05-20 RX ORDER — ONDANSETRON 2 MG/ML
INJECTION INTRAMUSCULAR; INTRAVENOUS AS NEEDED
Status: DISCONTINUED | OUTPATIENT
Start: 2024-05-20 | End: 2024-05-20 | Stop reason: SURG

## 2024-05-20 RX ORDER — HYDROMORPHONE HYDROCHLORIDE 1 MG/ML
0.2 INJECTION, SOLUTION INTRAMUSCULAR; INTRAVENOUS; SUBCUTANEOUS EVERY 5 MIN PRN
Status: DISCONTINUED | OUTPATIENT
Start: 2024-05-20 | End: 2024-05-20 | Stop reason: HOSPADM

## 2024-05-20 RX ORDER — METOPROLOL TARTRATE 1 MG/ML
2.5 INJECTION, SOLUTION INTRAVENOUS ONCE
Status: DISCONTINUED | OUTPATIENT
Start: 2024-05-20 | End: 2024-05-20 | Stop reason: HOSPADM

## 2024-05-20 RX ORDER — BUPIVACAINE HYDROCHLORIDE AND EPINEPHRINE 5; 5 MG/ML; UG/ML
INJECTION, SOLUTION PERINEURAL AS NEEDED
Status: DISCONTINUED | OUTPATIENT
Start: 2024-05-20 | End: 2024-05-20 | Stop reason: HOSPADM

## 2024-05-20 RX ADMIN — LIDOCAINE HYDROCHLORIDE 40 MG: 10 INJECTION, SOLUTION EPIDURAL; INFILTRATION; INTRACAUDAL; PERINEURAL at 14:52:00

## 2024-05-20 RX ADMIN — GLYCOPYRROLATE 0.4 MG: 0.2 INJECTION, SOLUTION INTRAMUSCULAR; INTRAVENOUS at 15:29:00

## 2024-05-20 RX ADMIN — SODIUM CHLORIDE: 9 INJECTION, SOLUTION INTRAVENOUS at 14:46:00

## 2024-05-20 RX ADMIN — ONDANSETRON 4 MG: 2 INJECTION INTRAMUSCULAR; INTRAVENOUS at 15:25:00

## 2024-05-20 RX ADMIN — NEOSTIGMINE METHYLSULFATE 3 MG: 1 INJECTION, SOLUTION INTRAVENOUS at 15:29:00

## 2024-05-20 RX ADMIN — ROCURONIUM BROMIDE 30 MG: 10 INJECTION, SOLUTION INTRAVENOUS at 14:52:00

## 2024-05-20 NOTE — PLAN OF CARE
No acute changes noted throughout shift. Call light within reach. Call appropriately.     Problem: Patient Centered Care  Goal: Patient preferences are identified and integrated in the patient's plan of care  Description: Interventions:  - What would you like us to know as we care for you? From home alone  - Provide timely, complete, and accurate information to patient/family  - Incorporate patient and family knowledge, values, beliefs, and cultural backgrounds into the planning and delivery of care  - Encourage patient/family to participate in care and decision-making at the level they choose  - Honor patient and family perspectives and choices  Outcome: Progressing     Problem: Diabetes/Glucose Control  Goal: Glucose maintained within prescribed range  Description: INTERVENTIONS:  - Monitor Blood Glucose as ordered  - Assess for signs and symptoms of hyperglycemia and hypoglycemia  - Administer ordered medications to maintain glucose within target range  - Assess barriers to adequate nutritional intake and initiate nutrition consult as needed  - Instruct patient on self management of diabetes  Outcome: Progressing     Problem: Patient/Family Goals  Goal: Patient/Family Long Term Goal  Description: Patient's Long Term Goal: determine source of pain    Interventions:  - imaging, microbiology  - See additional Care Plan goals for specific interventions  Outcome: Progressing  Goal: Patient/Family Short Term Goal  Description: Patient's Short Term Goal stop cough    Interventions:   - meds  - See additional Care Plan goals for specific interventions  Outcome: Progressing     Problem: PAIN - ADULT  Goal: Verbalizes/displays adequate comfort level or patient's stated pain goal  Description: INTERVENTIONS:  - Encourage pt to monitor pain and request assistance  - Assess pain using appropriate pain scale  - Administer analgesics based on type and severity of pain and evaluate response  - Implement non-pharmacological  measures as appropriate and evaluate response  - Consider cultural and social influences on pain and pain management  - Manage/alleviate anxiety  - Utilize distraction and/or relaxation techniques  - Monitor for opioid side effects  - Notify MD/LIP if interventions unsuccessful or patient reports new pain  - Anticipate increased pain with activity and pre-medicate as appropriate  Outcome: Progressing     Problem: METABOLIC/FLUID AND ELECTROLYTES - ADULT  Goal: Electrolytes maintained within normal limits  Description: INTERVENTIONS:  - Monitor labs and rhythm and assess patient for signs and symptoms of electrolyte imbalances  - Administer electrolyte replacement as ordered  - Monitor response to electrolyte replacements, including rhythm and repeat lab results as appropriate  - Fluid restriction as ordered  - Instruct patient on fluid and nutrition restrictions as appropriate  Outcome: Progressing  Goal: Hemodynamic stability and optimal renal function maintained  Description: INTERVENTIONS:  - Monitor labs and assess for signs and symptoms of volume excess or deficit  - Monitor intake, output and patient weight  - Monitor urine specific gravity, serum osmolarity and serum sodium as indicated or ordered  - Monitor response to interventions for patient's volume status, including labs, urine output, blood pressure (other measures as available)  - Encourage oral intake as appropriate  - Instruct patient on fluid and nutrition restrictions as appropriate  Outcome: Progressing

## 2024-05-20 NOTE — PLAN OF CARE
Patient received in bed from recovery room. Alert x 4. Vital signs taken and stable. Surgical dressing present to left lower quadrant that is dry and intact. Patient currently on 2 liters of oxygen by nasal cannula. No complaints of pain or discomfort at current time. Call light within reach at all times.

## 2024-05-20 NOTE — PROCEDURES
Interventional Radiology  Brief Post-Procedure Note    Procedure(s): tunneled hemodialysis catheter insertion    Indication: nonfunctioning peritoneal dialysis catheter    (s): Wesley    Anesthesia: Sedation    Findings:    -partially thrombosed right internal jugular vein  -insertion of new 19 cm tunneled hemodialysis catheter via right internal jugular vein  -terminates in right atrium    Blood loss: <5 mL      Complications: None    Plan:   -ready for use  -to OR for removal of peritoneal dialysis catheter    Please refer to full dictation under the \"Imaging\" tab in Epic.    Darion Olson MD  5/20/2024  Interventional Radiology  [facility]

## 2024-05-20 NOTE — ANESTHESIA PROCEDURE NOTES
Airway  Date/Time: 5/20/2024 2:55 PM  Urgency: Elective    Airway not difficult    General Information and Staff    Patient location during procedure: OR  Anesthesiologist: Chalino Crawley MD  Resident/CRNA: Jill Mendoza CRNA  Performed: CRNA   Performed by: Jill Mendoza CRNA  Authorized by: Chalino Crawley MD      Indications and Patient Condition  Indications for airway management: anesthesia  Sedation level: deep  Preoxygenated: yes  Patient position: sniffing  Mask difficulty assessment: 1 - vent by mask    Final Airway Details  Final airway type: endotracheal airway      Successful airway: ETT  Cuffed: yes   Successful intubation technique: Video laryngoscopy  Facilitating devices/methods: intubating stylet  Endotracheal tube insertion site: oral  Blade: Melanie (Guerrero)  Blade size: #4  ETT size (mm): 7.5    Cormack-Lehane Classification: grade I - full view of glottis  Placement verified by: capnometry   Measured from: lips  ETT to lips (cm): 22  Number of attempts at approach: 1  Number of other approaches attempted: 0    Additional Comments  Preoxygenated. Atraumatic intubation on first attempt. Gauze bite block placed between molars, tongue free from pressure.

## 2024-05-20 NOTE — OPERATIVE REPORT
Pre-Operative Diagnosis: Hemodialysis catheter malfunction (HCC) [T82.41XA]     Post-Operative Diagnosis: Hemodialysis catheter malfunction (HCC) [T82.41XA]      Procedure Performed:   REMOVAL OF PERITONEAL DIALYSIS CATHETER     Surgeons and Role:     * Annetta Willis MD - Primary     Assistant(s):  Surgical Assistant.: Leroy Barton     Surgical Findings: PD catheter     Specimen: None     Estimated Blood Loss: < 5 mL    Indications:   Consult requested for dc nonfunctioning PD catheter. Dr. Stark placed the PD catheter in 9/2023. It was working well until pt developed an infection. The infection was successfully treated, however now the catheter is nonfunctional, no drainage.     Procedure:   Description:  The patient was placed in the supine position. The skin was prepped and draped in sterile fashion.  The skin and subcutaneous tissue surrounding the catheter were infiltrated with 0.5 % Marcaine with epi.  An  elliptical incision was made of the skin around the catheter. The catheter was put under gentle traction and the subcutaneous cuff was circumferentially freed up using blunt dissection. The trajectory of the tubing was evaluated and the incision was extended medially, the subcutaneous tissues were divided. The  peritoneal cuff was identified and freed with blunt dissection. 0 vicryl sutures were placed to close the defect and tied as the catheter was removed.  The defect was irrigated with saline.  The medial portion of the wound was closed in a layered fashion with 3.0 vicryl and 4.0 vicryl. The lateral portion was left open and iodoform gauze was placed. Steris were applied and a gauze dressing was placed over this.  The patient tolerated the procedure well and was given wound care instructions.         Annetta Willis MD

## 2024-05-20 NOTE — BRIEF OP NOTE
Pre-Operative Diagnosis: Hemodialysis catheter malfunction (HCC) [T82.41XA]     Post-Operative Diagnosis: Hemodialysis catheter malfunction (HCC) [T82.41XA]      Procedure Performed:   REMOVAL OF PERITONEAL DIALYSIS CATHETER    Surgeons and Role:     * Annetta Willis MD - Primary    Assistant(s):  Surgical Assistant.: Leroy Barton     Surgical Findings: PD catheter     Specimen: None     Estimated Blood Loss: < 5 mL        Annetta Willis MD  5/20/2024  3:34 PM

## 2024-05-20 NOTE — PLAN OF CARE
Problem: Patient Centered Care  Goal: Patient preferences are identified and integrated in the patient's plan of care  Description: Interventions:  - What would you like us to know as we care for you? From home alone  - Provide timely, complete, and accurate information to patient/family  - Incorporate patient and family knowledge, values, beliefs, and cultural backgrounds into the planning and delivery of care  - Encourage patient/family to participate in care and decision-making at the level they choose  - Honor patient and family perspectives and choices  Outcome: Progressing     Problem: Diabetes/Glucose Control  Goal: Glucose maintained within prescribed range  Description: INTERVENTIONS:  - Monitor Blood Glucose as ordered  - Assess for signs and symptoms of hyperglycemia and hypoglycemia  - Administer ordered medications to maintain glucose within target range  - Assess barriers to adequate nutritional intake and initiate nutrition consult as needed  - Instruct patient on self management of diabetes  Outcome: Progressing     Problem: Patient/Family Goals  Goal: Patient/Family Long Term Goal  Description: Patient's Long Term Goal: determine source of pain    Interventions:  - imaging, microbiology  - See additional Care Plan goals for specific interventions  Outcome: Progressing  Goal: Patient/Family Short Term Goal  Description: Patient's Short Term Goal stop cough    Interventions:   - meds  - See additional Care Plan goals for specific interventions  Outcome: Progressing     Problem: PAIN - ADULT  Goal: Verbalizes/displays adequate comfort level or patient's stated pain goal  Description: INTERVENTIONS:  - Encourage pt to monitor pain and request assistance  - Assess pain using appropriate pain scale  - Administer analgesics based on type and severity of pain and evaluate response  - Implement non-pharmacological measures as appropriate and evaluate response  - Consider cultural and social influences on  pain and pain management  - Manage/alleviate anxiety  - Utilize distraction and/or relaxation techniques  - Monitor for opioid side effects  - Notify MD/LIP if interventions unsuccessful or patient reports new pain  - Anticipate increased pain with activity and pre-medicate as appropriate  Outcome: Progressing     Problem: METABOLIC/FLUID AND ELECTROLYTES - ADULT  Goal: Electrolytes maintained within normal limits  Description: INTERVENTIONS:  - Monitor labs and rhythm and assess patient for signs and symptoms of electrolyte imbalances  - Administer electrolyte replacement as ordered  - Monitor response to electrolyte replacements, including rhythm and repeat lab results as appropriate  - Fluid restriction as ordered  - Instruct patient on fluid and nutrition restrictions as appropriate  Outcome: Progressing  Goal: Hemodynamic stability and optimal renal function maintained  Description: INTERVENTIONS:  - Monitor labs and assess for signs and symptoms of volume excess or deficit  - Monitor intake, output and patient weight  - Monitor urine specific gravity, serum osmolarity and serum sodium as indicated or ordered  - Monitor response to interventions for patient's volume status, including labs, urine output, blood pressure (other measures as available)  - Encourage oral intake as appropriate  - Instruct patient on fluid and nutrition restrictions as appropriate  Outcome: Progressing     Patient is presently resting in the bed. Alert x 4. Vital signs taken and stable. Self care and independent. No complaints of pain or discomfort at current time. Patient had Tunneled hemodialysis catheter insertion on this morning. Patient denied pain or discomfort at present time. Patient currently nothing by mouth for surgical procedure of Peritoneal dialysis removal on today by Dr. Chris Willis. Call light within reach at all times.

## 2024-05-20 NOTE — PROGRESS NOTES
[facility]  part of [facility]    Progress Note    Gorge Alejandre Patient Status:  Inpatient    1965 MRN [id number]   Location [facility] 5SW/SE Attending Francie Arizmendi, DO   Hosp Day # 5 PCP Jatinder Hein MD     Chief complaint abd pain     Subjective:   Gorge Alejandre is a(n) 58 year old male Pt doing well this am. Just had his HD catheter. Mild pain     ROS:   No cp, sob   No c/d   No n/v     Objective:   Blood pressure 142/83, pulse 76, temperature 97.9 °F (36.6 °C), temperature source Oral, resp. rate 18, weight 182 lb 3.2 oz (82.6 kg), SpO2 96%.    No intake or output data in the 24 hours ending 24 1346      Patient Weight(s) for the past 336 hrs:   Weight   24 0438 182 lb 3.2 oz (82.6 kg)   24 0709 187 lb 14.4 oz (85.2 kg)   24 0601 194 lb 1.6 oz (88 kg)   24 1400 189 lb 6.4 oz (85.9 kg)   05/15/24 1907 178 lb 9.6 oz (81 kg)   05/15/24 1010 183 lb (83 kg)           General appearance: alert, appears stated age and cooperative  Pulmonary:  clear to auscultation bilaterally  Cardiovascular: S1, S2 normal, no murmur, click, rub or gallop, regular rate and rhythm  Abdominal: soft, tender in lower abd, pd catheter in place , no erythema   Extremities: extremities normal, atraumatic, no cyanosis or edema        Medicines:     Current Facility-Administered Medications   Medication Dose Route Frequency    [Transfer Hold] benzocaine (Anbesol) 10% gel   Mouth/Throat Q6H PRN    [Transfer Hold] heparin sodium lock flush 100 UNIT/ML injection 150 Units  1.5 mL Intravenous Once    [Transfer Hold] epoetin anuja (Epogen, Procrit) 29255 UNIT/ML injection 10,000 Units  10,000 Units Subcutaneous Once per day on     [Transfer Hold] sodium chloride 0.9 % IV bolus 100 mL  100 mL Intravenous Q30 Min PRN    And    [Transfer Hold] albumin human (Albumin) 25% injection 25 g  25 g Intravenous PRN Dialysis    [Transfer Hold] heparin (Porcine)  1000 UNIT/ML injection 4,000 Units  4 mL Intracatheter PRN Dialysis    [Transfer Hold] heparin (Porcine) 5000 UNIT/ML injection 5,000 Units  5,000 Units Subcutaneous Q8H KHOA    [Transfer Hold] dextrose 1.5%-calcium 2.5 mEq/L peritoneal solution  5,000 mL Intraperitoneal Once in dialysis    [Transfer Hold] insulin aspart (NovoLOG) 100 Units/mL FlexPen 1-7 Units  1-7 Units Subcutaneous TID CC and HS    [Transfer Hold] glucose (Dex4) 15 GM/59ML oral liquid 15 g  15 g Oral Q15 Min PRN    Or    [Transfer Hold] glucose (Glutose) 40% oral gel 15 g  15 g Oral Q15 Min PRN    Or    [Transfer Hold] glucose-vitamin C (Dex-4) chewable tab 4 tablet  4 tablet Oral Q15 Min PRN    Or    [Transfer Hold] dextrose 50% injection 50 mL  50 mL Intravenous Q15 Min PRN    Or    [Transfer Hold] glucose (Dex4) 15 GM/59ML oral liquid 30 g  30 g Oral Q15 Min PRN    Or    [Transfer Hold] glucose (Glutose) 40% oral gel 30 g  30 g Oral Q15 Min PRN    Or    [Transfer Hold] glucose-vitamin C (Dex-4) chewable tab 8 tablet  8 tablet Oral Q15 Min PRN    [Transfer Hold] acetaminophen (Tylenol Extra Strength) tab 500 mg  500 mg Oral Q4H PRN    [Transfer Hold] ondansetron (Zofran) 4 MG/2ML injection 4 mg  4 mg Intravenous Q6H PRN    [Transfer Hold] prochlorperazine (Compazine) 10 MG/2ML injection 5 mg  5 mg Intravenous Q8H PRN    [Transfer Hold] HYDROmorphone (Dilaudid) 1 MG/ML injection 0.2 mg  0.2 mg Intravenous Q2H PRN    Or    [Transfer Hold] HYDROmorphone (Dilaudid) 1 MG/ML injection 0.4 mg  0.4 mg Intravenous Q2H PRN    Or    [Transfer Hold] HYDROmorphone (Dilaudid) 1 MG/ML injection 0.8 mg  0.8 mg Intravenous Q2H PRN    [Transfer Hold] acetaminophen (Tylenol) tab 650 mg  650 mg Oral Q4H PRN    Or    [Transfer Hold] HYDROcodone-acetaminophen (Norco) 5-325 MG per tab 1 tablet  1 tablet Oral Q4H PRN    Or    [Transfer Hold] HYDROcodone-acetaminophen (Norco) 5-325 MG per tab 2 tablet  2 tablet Oral Q4H PRN    [Transfer Hold] temazepam (Restoril) cap 15  mg  15 mg Oral Nightly PRN    Vancomycin: PHARMACY DOSING  1 each Intravenous See Admin Instructions (RX holding)    [Transfer Hold] guaiFENesin (Robitussin) 100 MG/5 ML oral liquid 200 mg  200 mg Oral Q4H PRN    [Transfer Hold] amLODIPine (Norvasc) tab 10 mg  10 mg Oral QAM    [Transfer Hold] atorvastatin (Lipitor) tab 80 mg  80 mg Oral Nightly    [Transfer Hold] metoprolol succinate ER (Toprol XL) 24 hr tab 100 mg  100 mg Oral Daily    [Transfer Hold] metoprolol succinate ER (Toprol XL) 24 hr tab 25 mg  25 mg Oral Daily    [Transfer Hold] sevelamer carbonate (Renvela) tab 2,400 mg  2,400 mg Oral TID CC    [Transfer Hold] dextrose 1.5%-calcium 2.5 mEq/L peritoneal solution  5,000 mL Intraperitoneal Once in dialysis    [Transfer Hold] benzocaine-menthol (Cepacol) lozenge 1 lozenge  1 lozenge Oral PRN    [Transfer Hold] benzonatate (Tessalon) cap 100 mg  100 mg Oral TID PRN       Lab Results   Component Value Date    WBC 13.5 (H) 05/20/2024    HGB 9.5 (L) 05/20/2024    HCT 26.4 (L) 05/20/2024    .0 05/20/2024    CREATSERUM 12.42 (H) 05/20/2024    BUN 63 (H) 05/20/2024     (L) 05/20/2024    K 3.2 (L) 05/20/2024    CL 96 (L) 05/20/2024    CO2 26.0 05/20/2024     (H) 05/20/2024    CA 8.2 (L) 05/20/2024    ALB 3.6 05/20/2024    ALKPHO 271 (H) 05/20/2024    BILT 0.2 (L) 05/20/2024    TP 7.1 05/20/2024    AST 60 (H) 05/20/2024    ALT 97 (H) 05/20/2024    PTT 39.9 (H) 05/19/2024    INR 1.07 05/19/2024    TSH 1.870 08/01/2023    PSA 0.9 02/10/2017    CRP <0.29 08/01/2023    MG 1.8 05/20/2024    PHOS 2.8 05/20/2024    B12 585 08/01/2023       IR TUNNELED CV CATH INSERTION EXCHANGE CHECK    Result Date: 5/20/2024  CONCLUSION: Placement of tunneled dialysis venous catheter via right internal jugular vein.    Dictated by (CST): Darion Olson MD on 5/20/2024 at 12:20 PM     Finalized by (CST): Darion Olson MD on 5/20/2024 at 12:21 PM               Results:     CBC:    Lab Results   Component Value  Date    WBC 13.5 (H) 05/20/2024    WBC 11.9 (H) 05/19/2024    WBC 12.8 (H) 05/18/2024     Lab Results   Component Value Date    HGB 9.5 (L) 05/20/2024    HGB 8.8 (L) 05/19/2024    HGB 9.5 (L) 05/18/2024      Lab Results   Component Value Date    .0 05/20/2024    .0 05/19/2024    .0 05/18/2024       Recent Labs   Lab 05/18/24  1020 05/19/24  0639 05/20/24  0701   * 117* 101*   BUN 51* 54* 63*   CREATSERUM 11.12* 11.79* 12.42*   CA 8.0* 8.0* 8.2*   * 130* 133*   K 3.0* 2.8* 3.2*   CL 97* 96* 96*   CO2 24.0 27.0 26.0             Assessment and Plan:      ASSESSMENT AND PLAN:    1.       Clinical presentation suggestive of possible underlying spontaneous bacterial peritonitis.    - apprec ID and Renal   - cont iv cefepime and vanco   - await final cxs, gpc clusters in fluid - staph aureus   - blood cxs neg to date   - plan PD catheter removal today. Plan surgery consult - apprec input - scheduled for later today     2.       End-stage renal disease, on peritoneal dialysis for last 6 mo's. Transitioned from HD  - PD catheter no longer working so will need it removed. Consult Surgery Dr Willis. Apprec input   - HD catheter placed. Plan for HD per renal     3.       Diabetes mellitus type 2.  A1c 6.9   - accu checks ac and hs    - controlled     4. Right internal jugular dvt - on eliquis since pos doppler in sep - pt reports he has been off of it. Cont to hold     5. HTN - cont metoprolol and amlodipine    Dvt ppx heparin              Francie Arizmendi DO         Chart reviewed, including current vitals, notes, labs and imaging  Labs ordered and medications adjusted as outlined above  Coordinate care with care team/consultants  Discussed with patient results of tests, management plan as outlined above, and the need for ongoing hospitalization  D/w RN     MDM high

## 2024-05-20 NOTE — ANESTHESIA PREPROCEDURE EVALUATION
Anesthesia PreOp Note    HPI:     Gorge Alejandre is a 58 year old male who presents for preoperative consultation requested by: Annetta Willis MD    Date of Surgery: 5/15/2024 - 5/20/2024    Procedure(s):  REMOVAL OF PERITONEAL DIALYSIS CATHETER  Indication: Hemodialysis catheter malfunction (Formerly [facility]) [T82.41XA]    Relevant Problems   No relevant active problems       NPO:  Last Liquid Consumption Date: 05/20/24  Last Liquid Consumption Time: 0900  Last Solid Consumption Date: 05/19/24  Last Solid Consumption Time: 1900  Last Liquid Consumption Date: 05/20/24          History Review:  Patient Active Problem List    Diagnosis Date Noted    Peritonitis (Formerly [facility]) 05/17/2024    Abdominal pain, acute 05/15/2024    Peritoneal dialysis catheter in place (Formerly [facility]) 05/15/2024    SBP (spontaneous bacterial peritonitis) (Formerly [facility]) 05/15/2024    Secondary hyperparathyroidism (Formerly [facility]) 05/15/2024    ESRD (end stage renal disease) on dialysis (Formerly [facility]) 05/15/2024    End stage renal disease (Formerly [facility]) 08/02/2023    Acute renal failure superimposed on chronic kidney disease, unspecified CKD stage, unspecified acute renal failure type 08/01/2023    Normocytic anemia 08/01/2023    Metabolic acidosis 08/01/2023    Uremia 08/01/2023    Postprandial diarrhea 08/01/2023    Gastroesophageal reflux disease with esophagitis without hemorrhage     Polyp of ascending colon     Anemia 10/15/2021    Colon cancer screening 05/07/2021    Pseudophakia of both eyes 06/03/2019    Presbyopia 06/03/2019    Hypercholesteremia 09/21/2018    Controlled type 2 diabetes mellitus without complication, without long-term current use of insulin (Formerly [facility]) 03/31/2018    Primary hypertension 03/31/2018    Diastolic dysfunction without heart failure 03/31/2018       Past Medical History:    Blood disorder    Anemia    Colon polyp    repeat CLN in 5 years    Diabetes (Formerly [facility])    Diet control. Pt off from medications for 4 months now.    Diabetes mellitus (Formerly [facility])    Dialysis patient (Formerly [facility])    Pt with  temporary cath on R chest with HD started 8/2023 with HD q MWF.    Essential hypertension    High blood pressure    High cholesterol    History of blood transfusion    No blood transfusion reaction    Hyperlipidemia    Renal disorder    Strabismus    OD XT    Thrombus    Per pt had (+) clot on R IJ (temp HD cath)       Past Surgical History:   Procedure Laterality Date    Cataract      Cataract extraction w/  intraocular lens implant Bilateral 2017    Dr. OZZY Coleman    Colonoscopy N/A 3/2/2022    Procedure: COLONOSCOPY;  Surgeon: ALLAN Gaffney MD;  Location: [facility] ENDOSCOPY    Other surgical history      strabismus surgery    Strabismus surgery Bilateral 1995    Vasectomy         Medications Prior to Admission   Medication Sig Dispense Refill Last Dose    sevelamer carbonate 800 MG Oral Tab Take 3 tablets (2,400 mg total) by mouth 3 (three) times daily with meals.   Past Week    metoprolol tartrate 100 MG Oral Tab Take 1 tablet (100 mg total) by mouth daily.   Past Week    metoprolol tartrate 25 MG Oral Tab Take 1 tablet (25 mg total) by mouth daily.   Past Week    amLODIPine 10 MG Oral Tab Take 1 tablet (10 mg total) by mouth daily. (Patient taking differently: Take 1 tablet (10 mg total) by mouth every morning.) 90 tablet 0 Past Week    atorvastatin 80 MG Oral Tab Take 1 tablet (80 mg total) by mouth nightly. 90 tablet 1 Past Week    apixaban 5 MG Oral Tab Take 1 tablet (5 mg total) by mouth 2 (two) times daily. (Patient taking differently: Take 1 tablet (5 mg total) by mouth daily.) 180 tablet 0     VITAMIN D, CHOLECALCIFEROL, OR Take 1 capsule by mouth twice a week.        Current Facility-Administered Medications Ordered in Epic   Medication Dose Route Frequency Provider Last Rate Last Admin    [Transfer Hold] benzocaine (Anbesol) 10% gel   Mouth/Throat Q6H PRN Zenaida Zuniga MD   Given at 05/20/24 0651    [Transfer Hold] heparin sodium lock flush 100 UNIT/ML injection 150 Units  1.5 mL Intravenous Once  Darion Olson MD        [Transfer Hold] epoetin anuja (Epogen, Procrit) 42412 UNIT/ML injection 10,000 Units  10,000 Units Subcutaneous Once per day on Monday Wednesday Friday Angela Hernandez MD        [Transfer Hold] sodium chloride 0.9 % IV bolus 100 mL  100 mL Intravenous Q30 Min PRN Angela Hernandez MD        And    [Transfer Hold] albumin human (Albumin) 25% injection 25 g  25 g Intravenous PRN Dialysis Angela Hernandez MD        [Transfer Hold] heparin (Porcine) 1000 UNIT/ML injection 4,000 Units  4 mL Intracatheter PRN Dialysis Angela Hernandez MD        [Transfer Hold] heparin (Porcine) 5000 UNIT/ML injection 5,000 Units  5,000 Units Subcutaneous Q8H [facility] Francie Arizmendi DO   5,000 Units at 05/19/24 2114    [Transfer Hold] dextrose 1.5%-calcium 2.5 mEq/L peritoneal solution  5,000 mL Intraperitoneal Once in dialysis Joel Suggs MD        [Transfer Hold] insulin aspart (NovoLOG) 100 Units/mL FlexPen 1-7 Units  1-7 Units Subcutaneous TID CC and HS Zenaida Zuniga MD   2 Units at 05/19/24 1818    [Transfer Hold] glucose (Dex4) 15 GM/59ML oral liquid 15 g  15 g Oral Q15 Min PRN Tawana Garza MD        Or    [Transfer Hold] glucose (Glutose) 40% oral gel 15 g  15 g Oral Q15 Min PRN Tawana Garza MD        Or    [Transfer Hold] glucose-vitamin C (Dex-4) chewable tab 4 tablet  4 tablet Oral Q15 Min PRN Tawana Garza MD        Or    [Transfer Hold] dextrose 50% injection 50 mL  50 mL Intravenous Q15 Min PRN Tawana Garza MD        Or    [Transfer Hold] glucose (Dex4) 15 GM/59ML oral liquid 30 g  30 g Oral Q15 Min PRN Tawana Garza MD        Or    [Transfer Hold] glucose (Glutose) 40% oral gel 30 g  30 g Oral Q15 Min PRN Tawana Garza MD        Or    [Transfer Hold] glucose-vitamin C (Dex-4) chewable tab 8 tablet  8 tablet Oral Q15 Min PRN Tawana Garza MD        [Transfer Hold] acetaminophen (Tylenol Extra Strength) tab 500 mg  500 mg Oral Q4H PRN Tawana Garza MD         [Transfer Hold] ondansetron (Zofran) 4 MG/2ML injection 4 mg  4 mg Intravenous Q6H PRN Tawana Garza MD        [Transfer Hold] prochlorperazine (Compazine) 10 MG/2ML injection 5 mg  5 mg Intravenous Q8H PRN Tawana Garza MD        [Transfer Hold] HYDROmorphone (Dilaudid) 1 MG/ML injection 0.2 mg  0.2 mg Intravenous Q2H PRN Tawana Garza MD        Or    [Transfer Hold] HYDROmorphone (Dilaudid) 1 MG/ML injection 0.4 mg  0.4 mg Intravenous Q2H PRN Tawana Garza MD        Or    [Transfer Hold] HYDROmorphone (Dilaudid) 1 MG/ML injection 0.8 mg  0.8 mg Intravenous Q2H PRN Tawana Garza MD        [Transfer Hold] acetaminophen (Tylenol) tab 650 mg  650 mg Oral Q4H PRN Tawana Garza MD   650 mg at 05/20/24 0649    Or    [Transfer Hold] HYDROcodone-acetaminophen (Norco) 5-325 MG per tab 1 tablet  1 tablet Oral Q4H PRN Tawana Garza MD   1 tablet at 05/18/24 0937    Or    [Transfer Hold] HYDROcodone-acetaminophen (Norco) 5-325 MG per tab 2 tablet  2 tablet Oral Q4H PRN Tawana Garza MD        [Transfer Hold] temazepam (Restoril) cap 15 mg  15 mg Oral Nightly PRN Tawana Garza MD        Vancomycin: PHARMACY DOSING  1 each Intravenous See Admin Instructions (RX holding) Tawana Garza MD        [Transfer Hold] guaiFENesin (Robitussin) 100 MG/5 ML oral liquid 200 mg  200 mg Oral Q4H PRN Tawana Garza MD   200 mg at 05/17/24 0830    [Transfer Hold] amLODIPine (Norvasc) tab 10 mg  10 mg Oral QAM Tawana Garza MD   10 mg at 05/20/24 0903    [Transfer Hold] atorvastatin (Lipitor) tab 80 mg  80 mg Oral Nightly Tawana Garza MD   80 mg at 05/19/24 2112    [Transfer Hold] metoprolol succinate ER (Toprol XL) 24 hr tab 100 mg  100 mg Oral Daily Tawana Garza MD   100 mg at 05/20/24 0903    [Transfer Hold] metoprolol succinate ER (Toprol XL) 24 hr tab 25 mg  25 mg Oral Daily Tawana Garza MD   25 mg at 05/20/24 0903    [Transfer Hold] sevelamer carbonate (Renvela) tab 2,400 mg  2,400 mg Oral TID  CC Tawana Garza MD   2,400 mg at 05/19/24 1817    [Transfer Hold] dextrose 1.5%-calcium 2.5 mEq/L peritoneal solution  5,000 mL Intraperitoneal Once in dialysis Joel Suggs MD        [Transfer Hold] benzocaine-menthol (Cepacol) lozenge 1 lozenge  1 lozenge Oral PRN Zenaida Zuniga MD   1 lozenge at 05/18/24 1827    [Transfer Hold] benzonatate (Tessalon) cap 100 mg  100 mg Oral TID PRN Zenaida Zuniga MD   100 mg at 05/18/24 1827     No current Epic-ordered outpatient medications on file.       Allergies   Allergen Reactions    Losartan Coughing     Tolerated in 2023       Family History   Problem Relation Age of Onset    No Known Problems Father     Hypertension Mother     Diabetes Mother     Diabetes Brother     Hypertension Brother     Glaucoma Neg      Social History     Socioeconomic History    Marital status: Single   Tobacco Use    Smoking status: Never     Passive exposure: Never    Smokeless tobacco: Never   Vaping Use    Vaping status: Never Used   Substance and Sexual Activity    Alcohol use: Yes     Comment: social use only.    Drug use: No   Other Topics Concern    Pt has a pacemaker No    Pt has a defibrillator No    Reaction to local anesthetic No       Available pre-op labs reviewed.  Lab Results   Component Value Date    WBC 13.5 (H) 05/20/2024    RBC 2.97 (L) 05/20/2024    HGB 9.5 (L) 05/20/2024    HCT 26.4 (L) 05/20/2024    MCV 88.9 05/20/2024    MCH 32.0 05/20/2024    MCHC 36.0 05/20/2024    RDW 12.6 05/20/2024    .0 05/20/2024     Lab Results   Component Value Date     (L) 05/20/2024    K 3.2 (L) 05/20/2024    CL 96 (L) 05/20/2024    CO2 26.0 05/20/2024    BUN 63 (H) 05/20/2024    CREATSERUM 12.42 (H) 05/20/2024     (H) 05/20/2024    PGLU 113 (H) 05/20/2024    CA 8.2 (L) 05/20/2024     Lab Results   Component Value Date    INR 1.07 05/19/2024       Vital Signs:  Body mass index is 27.7 kg/m².   weight is 82.6 kg (182 lb 3.2 oz). His oral temperature is 97.9  °F (36.6 °C). His blood pressure is 142/83 and his pulse is 76. His respiration is 18 and oxygen saturation is 96%.   Vitals:    05/20/24 0901 05/20/24 0954 05/20/24 1258 05/20/24 1314   BP: (!) 158/93 146/88 (!) 153/92 142/83   Pulse: 80 75 77 76   Resp: 18 18 18 18   Temp: 98.4 °F (36.9 °C) 97.4 °F (36.3 °C) 98.4 °F (36.9 °C) 97.9 °F (36.6 °C)   TempSrc: Oral Oral Oral Oral   SpO2: 98% 94% 96% 96%   Weight:            Anesthesia Evaluation      Airway   Mallampati: II  Neck ROM: full  Dental      Pulmonary - normal exam   Cardiovascular - normal exam  (+) hypertension    Neuro/Psych      GI/Hepatic/Renal    (+) chronic renal disease    Endo/Other    (+) diabetes mellitus  Abdominal  - normal exam                 Anesthesia Plan:   ASA:  3  Plan:   General  Airway:  ETT  Post-op Pain Management: IV analgesics  Plan Comments: K+  3.2  Informed Consent Plan and Risks Discussed With:  Patient  Discussed plan with:  CRNA      I have informed Gorge Alejandre and/or legal guardian or family member of the nature of the anesthetic plan, benefits, risks including possible dental damage if relevant, major complications, and any alternative forms of anesthetic management.   All of the patient's questions were answered to the best of my ability. The patient desires the anesthetic management as planned.  CAROLYN LINDSEY MD  5/20/2024 1:24 PM  Present on Admission:  **None**

## 2024-05-20 NOTE — ANESTHESIA POSTPROCEDURE EVALUATION
Patient: Gorge Alejandre    Procedure Summary       Date: 05/20/24 Room / Location: [facility] MAIN OR  / [facility] MAIN OR    Anesthesia Start: 1446 Anesthesia Stop: 1547    Procedure: REMOVAL OF PERITONEAL DIALYSIS CATHETER (Abdomen) Diagnosis:       Hemodialysis catheter malfunction (HCC)      (Hemodialysis catheter malfunction (HCC) [T82.41XA])    Surgeons: Annetta Willis MD Anesthesiologist: Chalino Crawley MD    Anesthesia Type: general ASA Status: 3            Anesthesia Type: general    Vitals Value Taken Time   /84 05/20/24 1547   Temp 98 °F (36.7 °C) 05/20/24 1547   Pulse 74 05/20/24 1547   Resp 16 05/20/24 1547   SpO2 98 % 05/20/24 1547       [facility] AN Post Evaluation:   Patient Evaluated in PACU  Patient Participation: complete - patient participated  Level of Consciousness: sleepy but conscious  Pain Score: 0  Pain Management: adequate  Airway Patency:patent  Dental exam unchanged from preop  Yes    Cardiovascular Status: stable  Respiratory Status: acceptable and nasal cannula  Postoperative Hydration stable      Angelita Valle CRNA  5/20/2024 3:47 PM

## 2024-05-20 NOTE — PLAN OF CARE
Phone call made to [facility] at 6:48pm and spoke to Amy to arrange for Hemodialysis treatment for tomorrow, 5/21 per Dr. Zane Loevll orders.

## 2024-05-20 NOTE — PLAN OF CARE
Patient received in bed from procedure. Alert x 4. Vital signs taken and stable. Patient has tunneled hemodialysis catheter present to right side of chest. Dressing is dry and intact. No complaints of pain or discomfort. Call light within reach at all times.

## 2024-05-20 NOTE — PROGRESS NOTES
[facility]  Nephrology Daily Progress Note    Gorge Alejandre  [id number]  58 year old      HPI:   Just back from surgery and had PD catheter removed.  Tunneled dialysis catheter earlier this am.  Feeling OK.       ROS:     Constitutional:  Negative for decreased activity, fever, irritability and lethargy  Endocrine:  Negative for abnormal sleep patterns, increased activity, polydipsia and polyphagia  Cardiovascular:  Negative for cool extremity and irregular heartbeat/palpitations  Gastrointestinal:  Negative for abdominal pain, constipation, decreased appetite, diarrhea and vomiting  Genitourinary:  Negative for dysuria and hematuria  Hema/Lymph:  Negative for easy bleeding and easy bruising  Integumentary:  Negative for pruritus and rash  Musculoskeletal:  Negative for bone/joint symptoms  Neurological:  Negative for gait disturbance  Psychiatric:  Negative for inappropriate interaction and psychiatric symptoms  Respiratory:  Negative for cough, dyspnea and wheezing      PHYSICAL EXAM:   Temp:  [97.4 °F (36.3 °C)-99.4 °F (37.4 °C)] 97.7 °F (36.5 °C)  Pulse:  [73-83] 76  Resp:  [10-18] 12  BP: (130-158)/(83-93) 134/88  SpO2:  [94 %-99 %] 96 %  Patient Weight for the past 72 hrs:   Weight   05/18/24 0601 194 lb 1.6 oz (88 kg)   05/19/24 0709 187 lb 14.4 oz (85.2 kg)   05/20/24 0438 182 lb 3.2 oz (82.6 kg)       Constitutional: appears well hydrated alert and responsive no acute distress noted  Neck/Thyroid: neck is supple without adenopathy  Lymphatic: no abnormal cervical, supraclavicular or axillary adenopathy is noted  Respiratory: normal to inspection lungs are clear to auscultation bilaterally normal respiratory effort  Cardiovascular: regular rate and rhythm no murmurs, gallups, or rubs  Abdomen: soft non-tender non-distended no organomegaly noted no masses  Musculoskeletal: full ROM all extremities good strength  no deformities  Extremities: no edema, cyanosis, or clubbing  Neurological: exam  appropriate for age reflexes and motor skills appropriate for age    Labs:  Lab Results   Component Value Date    WBC 13.5 05/20/2024    HGB 9.5 05/20/2024    HCT 26.4 05/20/2024    .0 05/20/2024    CREATSERUM 12.42 05/20/2024    BUN 63 05/20/2024     05/20/2024    K 3.2 05/20/2024    CL 96 05/20/2024    CO2 26.0 05/20/2024     05/20/2024    CA 8.2 05/20/2024    ALB 3.6 05/20/2024    ALKPHO 271 05/20/2024    BILT 0.2 05/20/2024    TP 7.1 05/20/2024    AST 60 05/20/2024    ALT 97 05/20/2024    MG 1.8 05/20/2024    PHOS 2.8 05/20/2024     Recent Labs   Lab 05/18/24  1020 05/19/24  0639 05/20/24  0701   WBC 12.8* 11.9* 13.5*   HGB 9.5* 8.8* 9.5*   HCT 26.2* 24.2* 26.4*   .0 358.0 408.0     Recent Labs   Lab 05/15/24  1110 05/16/24  0638 05/18/24  1020 05/19/24  0639 05/20/24  0701   *   < > 107* 117* 101*   BUN 32*   < > 51* 54* 63*   CREATSERUM 9.62*   < > 11.12* 11.79* 12.42*   CA 8.5*   < > 8.0* 8.0* 8.2*   ALB 4.0  --   --   --  3.6   *   < > 132* 130* 133*   K 2.7*   < > 3.0* 2.8* 3.2*   CL 92*   < > 97* 96* 96*   CO2 30.0   < > 24.0 27.0 26.0   ALKPHO 119*  --   --   --  271*   AST 45*  --   --   --  60*   ALT 54*  --   --   --  97*   BILT 0.5  --   --   --  0.2*   TP 7.7  --   --   --  7.1   PHOS 5.0  --   --   --  2.8    < > = values in this interval not displayed.       Imaging  IR TUNNELED CV CATH INSERTION EXCHANGE CHECK    Result Date: 5/20/2024  CONCLUSION: Placement of tunneled dialysis venous catheter via right internal jugular vein.    Dictated by (CST): Darion Olson MD on 5/20/2024 at 12:20 PM     Finalized by (CST): Darion Olson MD on 5/20/2024 at 12:21 PM          XR ABDOMEN (1 VIEW) (CPT=74018)    Result Date: 5/17/2024  CONCLUSION: Peritoneal dialysis catheter enters from the left lower quadrant with distal end coiled in the right lower quadrant.  No fold, disconnect, or kink along the tract of the PD catheter.  Soft tissue emphysema around the  entry point of the catheter likely related to manipulation or recent placement.   Dictated by (CST): Johnathan Blanc MD on 5/17/2024 at 8:13 PM     Finalized by (CST): Johnathan Blanc MD on 5/17/2024 at 8:15 PM             Medications:    Current Facility-Administered Medications:     benzocaine (Anbesol) 10% gel, , Mouth/Throat, Q6H PRN    HYDROcodone-acetaminophen (Norco) 5-325 MG per tab 1 tablet, 1 tablet, Oral, Q6H PRN    epoetin anuja (Epogen, Procrit) 45761 UNIT/ML injection 10,000 Units, 10,000 Units, Subcutaneous, Once per day on Monday Wednesday Friday    [Transfer Hold] sodium chloride 0.9 % IV bolus 100 mL, 100 mL, Intravenous, Q30 Min PRN **AND** [Transfer Hold] albumin human (Albumin) 25% injection 25 g, 25 g, Intravenous, PRN Dialysis    heparin (Porcine) 1000 UNIT/ML injection 4,000 Units, 4 mL, Intracatheter, PRN Dialysis    heparin (Porcine) 5000 UNIT/ML injection 5,000 Units, 5,000 Units, Subcutaneous, Q8H KHOA    dextrose 1.5%-calcium 2.5 mEq/L peritoneal solution, 5,000 mL, Intraperitoneal, Once in dialysis    insulin aspart (NovoLOG) 100 Units/mL FlexPen 1-7 Units, 1-7 Units, Subcutaneous, TID CC and HS    glucose (Dex4) 15 GM/59ML oral liquid 15 g, 15 g, Oral, Q15 Min PRN **OR** glucose (Glutose) 40% oral gel 15 g, 15 g, Oral, Q15 Min PRN **OR** glucose-vitamin C (Dex-4) chewable tab 4 tablet, 4 tablet, Oral, Q15 Min PRN **OR** dextrose 50% injection 50 mL, 50 mL, Intravenous, Q15 Min PRN **OR** glucose (Dex4) 15 GM/59ML oral liquid 30 g, 30 g, Oral, Q15 Min PRN **OR** glucose (Glutose) 40% oral gel 30 g, 30 g, Oral, Q15 Min PRN **OR** glucose-vitamin C (Dex-4) chewable tab 8 tablet, 8 tablet, Oral, Q15 Min PRN    acetaminophen (Tylenol Extra Strength) tab 500 mg, 500 mg, Oral, Q4H PRN    ondansetron (Zofran) 4 MG/2ML injection 4 mg, 4 mg, Intravenous, Q6H PRN    prochlorperazine (Compazine) 10 MG/2ML injection 5 mg, 5 mg, Intravenous, Q8H PRN    HYDROmorphone (Dilaudid) 1 MG/ML injection 0.2 mg, 0.2  mg, Intravenous, Q2H PRN **OR** HYDROmorphone (Dilaudid) 1 MG/ML injection 0.4 mg, 0.4 mg, Intravenous, Q2H PRN **OR** HYDROmorphone (Dilaudid) 1 MG/ML injection 0.8 mg, 0.8 mg, Intravenous, Q2H PRN    acetaminophen (Tylenol) tab 650 mg, 650 mg, Oral, Q4H PRN **OR** HYDROcodone-acetaminophen (Norco) 5-325 MG per tab 1 tablet, 1 tablet, Oral, Q4H PRN **OR** HYDROcodone-acetaminophen (Norco) 5-325 MG per tab 2 tablet, 2 tablet, Oral, Q4H PRN    temazepam (Restoril) cap 15 mg, 15 mg, Oral, Nightly PRN    Vancomycin: PHARMACY DOSING, 1 each, Intravenous, See Admin Instructions (RX holding)    guaiFENesin (Robitussin) 100 MG/5 ML oral liquid 200 mg, 200 mg, Oral, Q4H PRN    amLODIPine (Norvasc) tab 10 mg, 10 mg, Oral, QAM    atorvastatin (Lipitor) tab 80 mg, 80 mg, Oral, Nightly    metoprolol succinate ER (Toprol XL) 24 hr tab 100 mg, 100 mg, Oral, Daily    metoprolol succinate ER (Toprol XL) 24 hr tab 25 mg, 25 mg, Oral, Daily    sevelamer carbonate (Renvela) tab 2,400 mg, 2,400 mg, Oral, TID CC    dextrose 1.5%-calcium 2.5 mEq/L peritoneal solution, 5,000 mL, Intraperitoneal, Once in dialysis    benzocaine-menthol (Cepacol) lozenge 1 lozenge, 1 lozenge, Oral, PRN    benzonatate (Tessalon) cap 100 mg, 100 mg, Oral, TID PRN    Allergies:  Allergies   Allergen Reactions    Losartan Coughing     Tolerated in 2023       Input/Output:    Intake/Output Summary (Last 24 hours) at 5/20/2024 1658  Last data filed at 5/20/2024 1458  Gross per 24 hour   Intake 10 ml   Output --   Net 10 ml          ASSESSMENT/PLAN:   Assessment   Patient Active Problem List   Diagnosis    Controlled type 2 diabetes mellitus without complication, without long-term current use of insulin ([facility])    Primary hypertension    Diastolic dysfunction without heart failure    Hypercholesteremia    Pseudophakia of both eyes    Presbyopia    Colon cancer screening    Anemia    Gastroesophageal reflux disease with esophagitis without hemorrhage    Polyp of  ascending colon    Acute renal failure superimposed on chronic kidney disease, unspecified CKD stage, unspecified acute renal failure type    Normocytic anemia    Metabolic acidosis    Uremia    Postprandial diarrhea    End stage renal disease (HCC)    Abdominal pain, acute    Peritoneal dialysis catheter in place (HCC)    SBP (spontaneous bacterial peritonitis) (HCC)    Secondary hyperparathyroidism (HCC)    ESRD (end stage renal disease) on dialysis (HCC)    Peritonitis (HCC)       HD was ordered for today but due to scheduling difficulties need to defer until am.  Soc worker working on out pt dialysis schedule.  Discussed with LINDSAY ECHAVARRIA              5/20/2024  Zane Lovell MD

## 2024-05-21 LAB
ALBUMIN SERPL-MCNC: 3.3 G/DL (ref 3.2–4.8)
ALBUMIN/GLOB SERPL: 1.1 {RATIO} (ref 1–2)
ALP LIVER SERPL-CCNC: 216 U/L
ALT SERPL-CCNC: 21 U/L
ANION GAP SERPL CALC-SCNC: 9 MMOL/L (ref 0–18)
AST SERPL-CCNC: 33 U/L (ref ?–34)
BASOPHILS # BLD AUTO: 0.03 X10(3) UL (ref 0–0.2)
BASOPHILS NFR BLD AUTO: 0.2 %
BILIRUB SERPL-MCNC: 0.3 MG/DL (ref 0.3–1.2)
BUN BLD-MCNC: 36 MG/DL (ref 9–23)
BUN/CREAT SERPL: 4.9 (ref 10–20)
CALCIUM BLD-MCNC: 7.8 MG/DL (ref 8.7–10.4)
CHLORIDE SERPL-SCNC: 100 MMOL/L (ref 98–112)
CO2 SERPL-SCNC: 26 MMOL/L (ref 21–32)
CREAT BLD-MCNC: 7.39 MG/DL
DEPRECATED RDW RBC AUTO: 41.1 FL (ref 35.1–46.3)
EGFRCR SERPLBLD CKD-EPI 2021: 8 ML/MIN/1.73M2 (ref 60–?)
EOSINOPHIL # BLD AUTO: 0.13 X10(3) UL (ref 0–0.7)
EOSINOPHIL NFR BLD AUTO: 1 %
ERYTHROCYTE [DISTWIDTH] IN BLOOD BY AUTOMATED COUNT: 12.4 % (ref 11–15)
GLOBULIN PLAS-MCNC: 3.1 G/DL (ref 2–3.5)
GLUCOSE BLD-MCNC: 89 MG/DL (ref 70–99)
GLUCOSE BLDC GLUCOMTR-MCNC: 145 MG/DL (ref 70–99)
GLUCOSE BLDC GLUCOMTR-MCNC: 181 MG/DL (ref 70–99)
GLUCOSE BLDC GLUCOMTR-MCNC: 193 MG/DL (ref 70–99)
GLUCOSE BLDC GLUCOMTR-MCNC: 96 MG/DL (ref 70–99)
HCT VFR BLD AUTO: 25.1 %
HGB BLD-MCNC: 8.7 G/DL
IMM GRANULOCYTES # BLD AUTO: 0.2 X10(3) UL (ref 0–1)
IMM GRANULOCYTES NFR BLD: 1.5 %
LYMPHOCYTES # BLD AUTO: 0.64 X10(3) UL (ref 1–4)
LYMPHOCYTES NFR BLD AUTO: 4.8 %
MAGNESIUM SERPL-MCNC: 1.7 MG/DL (ref 1.6–2.6)
MCH RBC QN AUTO: 31.1 PG (ref 26–34)
MCHC RBC AUTO-ENTMCNC: 34.7 G/DL (ref 31–37)
MCV RBC AUTO: 89.6 FL
MONOCYTES # BLD AUTO: 0.53 X10(3) UL (ref 0.1–1)
MONOCYTES NFR BLD AUTO: 3.9 %
NEUTROPHILS # BLD AUTO: 11.94 X10 (3) UL (ref 1.5–7.7)
NEUTROPHILS # BLD AUTO: 11.94 X10(3) UL (ref 1.5–7.7)
NEUTROPHILS NFR BLD AUTO: 88.6 %
OSMOLALITY SERPL CALC.SUM OF ELEC: 288 MOSM/KG (ref 275–295)
PHOSPHATE SERPL-MCNC: 2.4 MG/DL (ref 2.4–5.1)
PLATELET # BLD AUTO: 384 10(3)UL (ref 150–450)
POTASSIUM SERPL-SCNC: 3.7 MMOL/L (ref 3.5–5.1)
PROT SERPL-MCNC: 6.4 G/DL (ref 5.7–8.2)
RBC # BLD AUTO: 2.8 X10(6)UL
SODIUM SERPL-SCNC: 135 MMOL/L (ref 136–145)
VANCOMYCIN SERPL-MCNC: 11.7 UG/ML (ref ?–40)
WBC # BLD AUTO: 13.5 X10(3) UL (ref 4–11)

## 2024-05-21 PROCEDURE — 99232 SBSQ HOSP IP/OBS MODERATE 35: CPT | Performed by: HOSPITALIST

## 2024-05-21 PROCEDURE — 5A1D70Z PERFORMANCE OF URINARY FILTRATION, INTERMITTENT, LESS THAN 6 HOURS PER DAY: ICD-10-PCS | Performed by: INTERNAL MEDICINE

## 2024-05-21 PROCEDURE — 99233 SBSQ HOSP IP/OBS HIGH 50: CPT | Performed by: INTERNAL MEDICINE

## 2024-05-21 NOTE — PROGRESS NOTES
INFECTIOUS DISEASE PROGRESS NOTE  [facility]  part of [facility] ID PROGRESS NOTE    Gorge Alejandre Patient Status:  Inpatient    1965 MRN [id number]   Location [facility] 5SW/SE Attending Francie Arizmendi, DO   Hosp Day # 6 PCP Jatinder Hein MD     Subjective:  ROS reviewed. Seen on HD. No complaints of abdominal pain.    ASSESSMENT:    Antibiotics: Ancef  Cefepime, vancomycin     # Acute peritonitis in setting of PD catheter, cx  Staph aureus               -PD catheter placed 2023, s/p removal   # ESRD on PD  # Acute fever  # Diabetes mellitus     PLAN:  -  Continue on ancef post-HD for three week course (EOT 6/10/24).  -  Follow fever curve, wbc.  -  Reviewed labs, micro, imaging reports, available old records.  -  Case d/w patient, RN.     History of Present Illness:  Gorge Alejandre is a 58 year old male with a history of diabetes mellitus, ESRD on PD since 2023, who presented to [facility] ED on 5/15 with worsening abdominal pain. Patient states that he has been feeling unwell for the last month with ongoing abdominal pain. Started having fevers off and on for the last two weeks as high as 102. Did notice that his dialysate fluid was cloudy on Tuesday. On arrival, afebrile, wbc 9.7, PD fluid with 2722 wbcs, 91% PMNS, cx GPC in clusters, started on vancomycin and cefepime. Notes ongoing abdominal pain. ID consulted.    Physical Exam:  /88 (BP Location: Right arm)   Pulse 100   Temp 98.7 °F (37.1 °C) (Oral)   Resp 18   Wt 188 lb 3.2 oz (85.4 kg)   SpO2 94%   BMI 28.62 kg/m²     Gen:   Awake, in bed  HEENT:  EOMI, neck supple  CV/lungs:  Regular rate and rhythm, CTAB  Abdom:  Soft, PD catheter c/d/i  Skin/extrem:  No rashes, no c/c/e  Lines:  PIV+    Laboratory Data: Reviewed    Microbiology: Reviewed    Radiology: Reviewed      [facility]  [phone]  2024

## 2024-05-21 NOTE — DISCHARGE INSTRUCTIONS
[facility]  [phone]  [address] [place] [address]  T TH S 5am  Ok to go home; please give pt copy of his med list  Catheter care as per dr rose  To dialysis as scheduled     Medication List        START taking these medications      acetaminophen 500 MG Tabs  Commonly known as: Tylenol Extra Strength     benzocaine 10 % Gel  Commonly known as: Anbesol  Use as directed 1 Application in the mouth or throat every 6 (six) hours as needed for Pain (for toothache/gum ache).     benzonatate 100 MG Caps  Commonly known as: Tessalon  Take 1 capsule (100 mg total) by mouth 3 (three) times daily as needed for cough.     ceFAZolin in sodium chloride 0.9% 2 g/100mL Soln  Commonly known as: Ancef  Inject 100 mL (2 g total) into the vein See Admin Instructions. Give post-HD on HD days until 6/10     guaiFENesin 100 MG/5 ML  Commonly known as: Robitussin  Take 10 mL (200 mg total) by mouth every 6 (six) hours as needed.            CHANGE how you take these medications      amLODIPine 10 MG Tabs  Commonly known as: Norvasc  Take 1 tablet (10 mg total) by mouth daily.  What changed: when to take this            CONTINUE taking these medications      atorvastatin 80 MG Tabs  Commonly known as: Lipitor  Take 1 tablet (80 mg total) by mouth nightly.     * metoprolol tartrate 100 MG Tabs  Commonly known as: Lopressor     * metoprolol tartrate 25 MG Tabs  Commonly known as: Lopressor     sevelamer carbonate 800 MG Tabs  Commonly known as: Renvela     VITAMIN D (CHOLECALCIFEROL) OR           * This list has 2 medication(s) that are the same as other medications prescribed for you. Read the directions carefully, and ask your doctor or other care provider to review them with you.                STOP taking these medications      apixaban 5 MG Tabs  Commonly known as: Eliquis               Where to Get Your Medications        These medications were sent to [id number] - [place], [place] - [address]  [phone], [phone]  [address] [place] [address]      Phone: [phone]   benzocaine 10 % Gel  benzonatate 100 MG Caps  guaiFENesin 100 MG/5 ML       You can get these medications from any pharmacy    Bring a paper prescription for each of these medications  ceFAZolin in sodium chloride 0.9% 2 g/100mL Soln

## 2024-05-21 NOTE — PROGRESS NOTES
[facility] Pharmacy Dosing Service      Initial Pharmacokinetic Consult for Vancomycin Dosing     Gorge Alejandre is a 58 year old male who is being initiated on vancomycin therapy for  SBP .  Pharmacy has been asked to dose vancomycin by NAOMI Muniz.  The initial treatment and monitoring approach will be non-AUC strategy.        Weight and Temperature:    Wt Readings from Last 1 Encounters:   24 85.4 kg (188 lb 3.2 oz)        Temp Readings from Last 1 Encounters:   24 98.7 °F (37.1 °C) (Oral)      Labs:   Recent Labs   Lab 24  0639 24  0701 24  0741   CREATSERUM 11.79* 12.42* 7.39*      Estimated Creatinine Clearance: 10.8 mL/min (A) (based on SCr of 7.39 mg/dL (H)).     Recent Labs   Lab 24  0639 24  0701 24  0741   WBC 11.9* 13.5* 13.5*          The Pharmacokinetic Target is:    Trough/random 15-20 mg/L (applies to IV dosing in HD and IP dosing in APD)    Renal Dosing Considerations:    HD: Current regimen: T  S     Assessment/Plan:   Initial/Loading dose: Has received 2000 mg IV (25 mg/kg, capped at 2250 mg) x 1 loading dose.      Maintenance dose: Pharmacy will dose vancomycin per levels    Monitorin) Plan for vancomycin random level to be obtained prior to the 3rd HD session    2) Pharmacy will order SCr as clinically indicated to assess renal function.    3) Pharmacy will monitor for toxicity and efficacy, adjust vancomycin dose and/or frequency, and order vancomycin levels as appropriate per the Pharmacy and Therapeutics Committee approved protocol until discontinuation of the medication.       We appreciate the opportunity to assist in the care of this patient.     Dakota Ware, PharmD  2024  11:20 AM  [facility] Pharmacy Extension: [phone]

## 2024-05-21 NOTE — PROGRESS NOTES
[facility] Pharmacy Dosing Service      Initial Pharmacokinetic Consult for Vancomycin Dosing     Gorge Alejandre is a 58 year old male who is being initiated on vancomycin therapy for  SBP .  Pharmacy has been asked to dose vancomycin by NAOMI Muniz.  The initial treatment and monitoring approach will be non-AUC strategy.        Weight and Temperature:    Wt Readings from Last 1 Encounters:   24 85.4 kg (188 lb 3.2 oz)        Temp Readings from Last 1 Encounters:   24 98.7 °F (37.1 °C) (Oral)      Labs:   Recent Labs   Lab 24  0639 24  0701 24  0741   CREATSERUM 11.79* 12.42* 7.39*      Estimated Creatinine Clearance: 10.8 mL/min (A) (based on SCr of 7.39 mg/dL (H)).     Recent Labs   Lab 24  0639 24  0701 24  0741   WBC 11.9* 13.5* 13.5*          The Pharmacokinetic Target is:    Trough/random 15-20 mg/L (applies to IV dosing in HD and IP dosing in APD)    Renal Dosing Considerations:    HD: Current regimen: T TH S  Previously on PD, now temporary HD     Assessment/Plan:   Initial/Loading dose: Has received 2000 mg IV (25 mg/kg, capped at 2250 mg) x 1 loading dose.      Maintenance dose: Pharmacy will dose vancomycin per levels    Monitorin) Plan for vancomycin random level to be obtained prior to the 3rd HD session    2) Pharmacy will order SCr as clinically indicated to assess renal function.    3) Pharmacy will monitor for toxicity and efficacy, adjust vancomycin dose and/or frequency, and order vancomycin levels as appropriate per the Pharmacy and Therapeutics Committee approved protocol until discontinuation of the medication.       We appreciate the opportunity to assist in the care of this patient.     Dakota Ware, PharmD  2024  11:20 AM  [facility] Pharmacy Extension: [phone]

## 2024-05-21 NOTE — PROGRESS NOTES
[facility]  part of [facility]    Progress Note    Gorge Alejandre Patient Status:  Inpatient    1965 MRN [id number]   Location [facility] 5SW/SE Attending Francie Arizmendi, DO   Hosp Day # 6 PCP Jatinder Hein MD     Chief complaint abd pain     Subjective:   Gorge Alejandre is a(n) 58 year old male Pt doing well this am. No c/o's     ROS:   No cp, sob   No c/d   No n/v     Objective:   Blood pressure 136/88, pulse 100, temperature 98.7 °F (37.1 °C), temperature source Oral, resp. rate 18, weight 188 lb 3.2 oz (85.4 kg), SpO2 94%.      Intake/Output Summary (Last 24 hours) at 2024 1708  Last data filed at 2024 1512  Gross per 24 hour   Intake 360 ml   Output 1250 ml   Net -890 ml         Patient Weight(s) for the past 336 hrs:   Weight   24 0528 188 lb 3.2 oz (85.4 kg)   24 0438 182 lb 3.2 oz (82.6 kg)   24 0709 187 lb 14.4 oz (85.2 kg)   24 0601 194 lb 1.6 oz (88 kg)   24 1400 189 lb 6.4 oz (85.9 kg)   05/15/24 1907 178 lb 9.6 oz (81 kg)   05/15/24 1010 183 lb (83 kg)           General appearance: alert, appears stated age and cooperative  Pulmonary:  clear to auscultation bilaterally  Cardiovascular: S1, S2 normal, no murmur, click, rub or gallop, regular rate and rhythm  Abdominal: soft, tender in lower abd, pd catheter in place , no erythema   Extremities: extremities normal, atraumatic, no cyanosis or edema        Medicines:     Current Facility-Administered Medications   Medication Dose Route Frequency    ceFAZolin (Ancef) 1 g in dextrose 5% 100mL IVPB-ADD  1 g Intravenous Q24H    benzocaine (Anbesol) 10% gel   Mouth/Throat Q6H PRN    iron sucrose (Venofer) 20 mg/mL injection 200 mg  200 mg Intravenous Daily    sodium chloride 0.9 % IV bolus 100 mL  100 mL Intravenous Q30 Min PRN    And    albumin human (Albumin) 25% injection 25 g  25 g Intravenous PRN Dialysis    heparin (Porcine) 1000 UNIT/ML injection 1,500 Units  1.5 mL  Intracatheter PRN Dialysis    epoetin anuja (Epogen, Procrit) 78712 UNIT/ML injection 10,000 Units  10,000 Units Subcutaneous Once per day on Monday Wednesday Friday    heparin (Porcine) 1000 UNIT/ML injection 4,000 Units  4 mL Intracatheter PRN Dialysis    heparin (Porcine) 5000 UNIT/ML injection 5,000 Units  5,000 Units Subcutaneous Q8H KHOA    dextrose 1.5%-calcium 2.5 mEq/L peritoneal solution  5,000 mL Intraperitoneal Once in dialysis    insulin aspart (NovoLOG) 100 Units/mL FlexPen 1-7 Units  1-7 Units Subcutaneous TID CC and HS    glucose (Dex4) 15 GM/59ML oral liquid 15 g  15 g Oral Q15 Min PRN    Or    glucose (Glutose) 40% oral gel 15 g  15 g Oral Q15 Min PRN    Or    glucose-vitamin C (Dex-4) chewable tab 4 tablet  4 tablet Oral Q15 Min PRN    Or    dextrose 50% injection 50 mL  50 mL Intravenous Q15 Min PRN    Or    glucose (Dex4) 15 GM/59ML oral liquid 30 g  30 g Oral Q15 Min PRN    Or    glucose (Glutose) 40% oral gel 30 g  30 g Oral Q15 Min PRN    Or    glucose-vitamin C (Dex-4) chewable tab 8 tablet  8 tablet Oral Q15 Min PRN    acetaminophen (Tylenol Extra Strength) tab 500 mg  500 mg Oral Q4H PRN    ondansetron (Zofran) 4 MG/2ML injection 4 mg  4 mg Intravenous Q6H PRN    prochlorperazine (Compazine) 10 MG/2ML injection 5 mg  5 mg Intravenous Q8H PRN    HYDROmorphone (Dilaudid) 1 MG/ML injection 0.2 mg  0.2 mg Intravenous Q2H PRN    Or    HYDROmorphone (Dilaudid) 1 MG/ML injection 0.4 mg  0.4 mg Intravenous Q2H PRN    Or    HYDROmorphone (Dilaudid) 1 MG/ML injection 0.8 mg  0.8 mg Intravenous Q2H PRN    acetaminophen (Tylenol) tab 650 mg  650 mg Oral Q4H PRN    Or    HYDROcodone-acetaminophen (Norco) 5-325 MG per tab 1 tablet  1 tablet Oral Q4H PRN    Or    HYDROcodone-acetaminophen (Norco) 5-325 MG per tab 2 tablet  2 tablet Oral Q4H PRN    temazepam (Restoril) cap 15 mg  15 mg Oral Nightly PRN    guaiFENesin (Robitussin) 100 MG/5 ML oral liquid 200 mg  200 mg Oral Q4H PRN    amLODIPine (Norvasc)  tab 10 mg  10 mg Oral QAM    atorvastatin (Lipitor) tab 80 mg  80 mg Oral Nightly    metoprolol succinate ER (Toprol XL) 24 hr tab 100 mg  100 mg Oral Daily    metoprolol succinate ER (Toprol XL) 24 hr tab 25 mg  25 mg Oral Daily    sevelamer carbonate (Renvela) tab 2,400 mg  2,400 mg Oral TID CC    dextrose 1.5%-calcium 2.5 mEq/L peritoneal solution  5,000 mL Intraperitoneal Once in dialysis    benzocaine-menthol (Cepacol) lozenge 1 lozenge  1 lozenge Oral PRN    benzonatate (Tessalon) cap 100 mg  100 mg Oral TID PRN       Lab Results   Component Value Date    WBC 13.5 (H) 05/21/2024    HGB 8.7 (L) 05/21/2024    HCT 25.1 (L) 05/21/2024    .0 05/21/2024    CREATSERUM 7.39 (H) 05/21/2024    BUN 36 (H) 05/21/2024     (L) 05/21/2024    K 3.7 05/21/2024     05/21/2024    CO2 26.0 05/21/2024    GLU 89 05/21/2024    CA 7.8 (L) 05/21/2024    ALB 3.3 05/21/2024    ALKPHO 216 (H) 05/21/2024    BILT 0.3 05/21/2024    TP 6.4 05/21/2024    AST 33 05/21/2024    ALT 21 05/21/2024    PTT 39.9 (H) 05/19/2024    INR 1.07 05/19/2024    TSH 1.870 08/01/2023    PSA 0.9 02/10/2017    CRP <0.29 08/01/2023    MG 1.7 05/21/2024    PHOS 2.4 05/21/2024    B12 585 08/01/2023       IR TUNNELED CV CATH INSERTION EXCHANGE CHECK    Result Date: 5/20/2024  CONCLUSION: Placement of tunneled dialysis venous catheter via right internal jugular vein.    Dictated by (CST): Darion Olson MD on 5/20/2024 at 12:20 PM     Finalized by (CST): Darion Olson MD on 5/20/2024 at 12:21 PM               Results:     CBC:    Lab Results   Component Value Date    WBC 13.5 (H) 05/21/2024    WBC 13.5 (H) 05/20/2024    WBC 11.9 (H) 05/19/2024     Lab Results   Component Value Date    HGB 8.7 (L) 05/21/2024    HGB 9.5 (L) 05/20/2024    HGB 8.8 (L) 05/19/2024      Lab Results   Component Value Date    .0 05/21/2024    .0 05/20/2024    .0 05/19/2024       Recent Labs   Lab 05/19/24  0639 05/20/24  0701 05/21/24  0741    * 101* 89   BUN 54* 63* 36*   CREATSERUM 11.79* 12.42* 7.39*   CA 8.0* 8.2* 7.8*   * 133* 135*   K 2.8* 3.2* 3.7   CL 96* 96* 100   CO2 27.0 26.0 26.0             Assessment and Plan:      ASSESSMENT AND PLAN:    1.       Clinical presentation suggestive of possible underlying spontaneous bacterial peritonitis.    - apprec ID and Renal   - cont iv cefepime and vanco -> changed to ancef   - await final cxs, gpc clusters in fluid - staph aureus   - blood cxs neg to date   - plan PD catheter removal - pod #1. Plan surgery consult - apprec input - Dr Willis    2.       End-stage renal disease, on peritoneal dialysis for last 6 mo's. Transitioned from HD  - PD catheter no longer working so will need it removed. Consult Surgery Dr Willis. Apprec input   - HD catheter placed. Plan for HD per renal - tolerated one session. Repeat in am. Possible dc tomorrow if tolerates     3.       Diabetes mellitus type 2.  A1c 6.9   - accu checks ac and hs    - controlled     4. Right internal jugular dvt - on eliquis since pos doppler in sep - pt reports he has been off of it. Cont to hold     5. HTN - cont metoprolol and amlodipine    Dvt ppx heparin              Francie Arizmendi, DO         Chart reviewed, including current vitals, notes, labs and imaging  Labs ordered and medications adjusted as outlined above  Coordinate care with care team/consultants  Discussed with patient results of tests, management plan as outlined above, and the need for ongoing hospitalization  D/w RN     MDM high

## 2024-05-21 NOTE — PROGRESS NOTES
[facility]  Nephrology Daily Progress Note    Gorge Alejandre  [id number]  58 year old      HPI:   Gorge Alejandre is a 58 year old male.    Had HD this am.  Tolerated well.  Abd pain much better.        ROS:     Constitutional:  Negative for decreased activity, fever, irritability and lethargy  Endocrine:  Negative for abnormal sleep patterns, increased activity, polydipsia and polyphagia  Cardiovascular:  Negative for cool extremity and irregular heartbeat/palpitations  Gastrointestinal:  Negative for abdominal pain, constipation, decreased appetite, diarrhea and vomiting  Genitourinary:  Negative for dysuria and hematuria  Hema/Lymph:  Negative for easy bleeding and easy bruising  Integumentary:  Negative for pruritus and rash  Musculoskeletal:  Negative for bone/joint symptoms  Neurological:  Negative for gait disturbance  Psychiatric:  Negative for inappropriate interaction and psychiatric symptoms  Respiratory:  Negative for cough, dyspnea and wheezing      PHYSICAL EXAM:   Temp:  [97.7 °F (36.5 °C)-99.4 °F (37.4 °C)] 98.7 °F (37.1 °C)  Pulse:  [] 100  Resp:  [10-18] 18  BP: (130-153)/(76-92) 136/88  SpO2:  [93 %-99 %] 94 %  Patient Weight for the past 72 hrs:   Weight   05/19/24 0709 187 lb 14.4 oz (85.2 kg)   05/20/24 0438 182 lb 3.2 oz (82.6 kg)   05/21/24 0528 188 lb 3.2 oz (85.4 kg)       Constitutional: appears well hydrated alert and responsive no acute distress noted  Neck/Thyroid: neck is supple without adenopathy  Lymphatic: no abnormal cervical, supraclavicular or axillary adenopathy is noted  Respiratory: normal to inspection lungs are clear to auscultation bilaterally normal respiratory effort  Cardiovascular: regular rate and rhythm no murmurs, gallups, or rubs  Abdomen: soft non-tender non-distended no organomegaly noted no masses  Musculoskeletal: full ROM all extremities good strength  no deformities  Extremities: no edema, cyanosis, or clubbing  Neurological: exam  appropriate for age reflexes and motor skills appropriate for age    Labs:  Lab Results   Component Value Date    WBC 13.5 05/21/2024    HGB 8.7 05/21/2024    HCT 25.1 05/21/2024    .0 05/21/2024    CREATSERUM 7.39 05/21/2024    BUN 36 05/21/2024     05/21/2024    K 3.7 05/21/2024     05/21/2024    CO2 26.0 05/21/2024    GLU 89 05/21/2024    CA 7.8 05/21/2024    ALB 3.3 05/21/2024    ALKPHO 216 05/21/2024    BILT 0.3 05/21/2024    TP 6.4 05/21/2024    AST 33 05/21/2024    ALT 21 05/21/2024    MG 1.7 05/21/2024    PHOS 2.4 05/21/2024     Recent Labs   Lab 05/19/24  0639 05/20/24  0701 05/21/24  0741   WBC 11.9* 13.5* 13.5*   HGB 8.8* 9.5* 8.7*   HCT 24.2* 26.4* 25.1*   .0 408.0 384.0     Recent Labs   Lab 05/15/24  1110 05/16/24  0638 05/19/24  0639 05/20/24  0701 05/21/24  0741   *   < > 117* 101* 89   BUN 32*   < > 54* 63* 36*   CREATSERUM 9.62*   < > 11.79* 12.42* 7.39*   CA 8.5*   < > 8.0* 8.2* 7.8*   ALB 4.0  --   --  3.6 3.3   *   < > 130* 133* 135*   K 2.7*   < > 2.8* 3.2* 3.7   CL 92*   < > 96* 96* 100   CO2 30.0   < > 27.0 26.0 26.0   ALKPHO 119*  --   --  271* 216*   AST 45*  --   --  60* 33   ALT 54*  --   --  97* 21   BILT 0.5  --   --  0.2* 0.3   TP 7.7  --   --  7.1 6.4   PHOS 5.0  --   --  2.8 2.4    < > = values in this interval not displayed.       Imaging  IR TUNNELED CV CATH INSERTION EXCHANGE CHECK    Result Date: 5/20/2024  CONCLUSION: Placement of tunneled dialysis venous catheter via right internal jugular vein.    Dictated by (CST): Darion Olson MD on 5/20/2024 at 12:20 PM     Finalized by (CST): Darion Olson MD on 5/20/2024 at 12:21 PM             Medications:    Current Facility-Administered Medications:     vancomycin (Vancocin) 1,000 mg in sodium chloride 0.9% 250 mL IVPB-ADDV, 10 mg/kg, Intravenous, Once    benzocaine (Anbesol) 10% gel, , Mouth/Throat, Q6H PRN    iron sucrose (Venofer) 20 mg/mL injection 200 mg, 200 mg, Intravenous,  Daily    sodium chloride 0.9 % IV bolus 100 mL, 100 mL, Intravenous, Q30 Min PRN **AND** albumin human (Albumin) 25% injection 25 g, 25 g, Intravenous, PRN Dialysis    heparin (Porcine) 1000 UNIT/ML injection 1,500 Units, 1.5 mL, Intracatheter, PRN Dialysis    epoetin anuja (Epogen, Procrit) 09681 UNIT/ML injection 10,000 Units, 10,000 Units, Subcutaneous, Once per day on Monday Wednesday Friday    [Transfer Hold] sodium chloride 0.9 % IV bolus 100 mL, 100 mL, Intravenous, Q30 Min PRN **AND** [Transfer Hold] albumin human (Albumin) 25% injection 25 g, 25 g, Intravenous, PRN Dialysis    heparin (Porcine) 1000 UNIT/ML injection 4,000 Units, 4 mL, Intracatheter, PRN Dialysis    heparin (Porcine) 5000 UNIT/ML injection 5,000 Units, 5,000 Units, Subcutaneous, Q8H KHOA    dextrose 1.5%-calcium 2.5 mEq/L peritoneal solution, 5,000 mL, Intraperitoneal, Once in dialysis    insulin aspart (NovoLOG) 100 Units/mL FlexPen 1-7 Units, 1-7 Units, Subcutaneous, TID CC and HS    glucose (Dex4) 15 GM/59ML oral liquid 15 g, 15 g, Oral, Q15 Min PRN **OR** glucose (Glutose) 40% oral gel 15 g, 15 g, Oral, Q15 Min PRN **OR** glucose-vitamin C (Dex-4) chewable tab 4 tablet, 4 tablet, Oral, Q15 Min PRN **OR** dextrose 50% injection 50 mL, 50 mL, Intravenous, Q15 Min PRN **OR** glucose (Dex4) 15 GM/59ML oral liquid 30 g, 30 g, Oral, Q15 Min PRN **OR** glucose (Glutose) 40% oral gel 30 g, 30 g, Oral, Q15 Min PRN **OR** glucose-vitamin C (Dex-4) chewable tab 8 tablet, 8 tablet, Oral, Q15 Min PRN    acetaminophen (Tylenol Extra Strength) tab 500 mg, 500 mg, Oral, Q4H PRN    ondansetron (Zofran) 4 MG/2ML injection 4 mg, 4 mg, Intravenous, Q6H PRN    prochlorperazine (Compazine) 10 MG/2ML injection 5 mg, 5 mg, Intravenous, Q8H PRN    HYDROmorphone (Dilaudid) 1 MG/ML injection 0.2 mg, 0.2 mg, Intravenous, Q2H PRN **OR** HYDROmorphone (Dilaudid) 1 MG/ML injection 0.4 mg, 0.4 mg, Intravenous, Q2H PRN **OR** HYDROmorphone (Dilaudid) 1 MG/ML injection  0.8 mg, 0.8 mg, Intravenous, Q2H PRN    acetaminophen (Tylenol) tab 650 mg, 650 mg, Oral, Q4H PRN **OR** HYDROcodone-acetaminophen (Norco) 5-325 MG per tab 1 tablet, 1 tablet, Oral, Q4H PRN **OR** HYDROcodone-acetaminophen (Norco) 5-325 MG per tab 2 tablet, 2 tablet, Oral, Q4H PRN    temazepam (Restoril) cap 15 mg, 15 mg, Oral, Nightly PRN    Vancomycin: PHARMACY DOSING, 1 each, Intravenous, See Admin Instructions (RX holding)    guaiFENesin (Robitussin) 100 MG/5 ML oral liquid 200 mg, 200 mg, Oral, Q4H PRN    amLODIPine (Norvasc) tab 10 mg, 10 mg, Oral, QAM    atorvastatin (Lipitor) tab 80 mg, 80 mg, Oral, Nightly    metoprolol succinate ER (Toprol XL) 24 hr tab 100 mg, 100 mg, Oral, Daily    metoprolol succinate ER (Toprol XL) 24 hr tab 25 mg, 25 mg, Oral, Daily    sevelamer carbonate (Renvela) tab 2,400 mg, 2,400 mg, Oral, TID CC    dextrose 1.5%-calcium 2.5 mEq/L peritoneal solution, 5,000 mL, Intraperitoneal, Once in dialysis    benzocaine-menthol (Cepacol) lozenge 1 lozenge, 1 lozenge, Oral, PRN    benzonatate (Tessalon) cap 100 mg, 100 mg, Oral, TID PRN    Allergies:  Allergies   Allergen Reactions    Losartan Coughing     Tolerated in 2023       Input/Output:    Intake/Output Summary (Last 24 hours) at 5/21/2024 1210  Last data filed at 5/21/2024 1108  Gross per 24 hour   Intake 130 ml   Output 1150 ml   Net -1020 ml          ASSESSMENT/PLAN:   Assessment   Patient Active Problem List   Diagnosis    Controlled type 2 diabetes mellitus without complication, without long-term current use of insulin (HCC)    Primary hypertension    Diastolic dysfunction without heart failure    Hypercholesteremia    Pseudophakia of both eyes    Presbyopia    Colon cancer screening    Anemia    Gastroesophageal reflux disease with esophagitis without hemorrhage    Polyp of ascending colon    Acute renal failure superimposed on chronic kidney disease, unspecified CKD stage, unspecified acute renal failure type    Normocytic  anemia    Metabolic acidosis    Uremia    Postprandial diarrhea    End stage renal disease (HCC)    Abdominal pain, acute    Peritoneal dialysis catheter in place (HCC)    SBP (spontaneous bacterial peritonitis) (HCC)    Secondary hyperparathyroidism (HCC)    ESRD (end stage renal disease) on dialysis (HCC)    Peritonitis (HCC)       Overall better. Out pt HD being arranged.  Next HD will be on Thurs. Repeat Vanco dose today.  Home soon if OK with others.              5/21/2024  Zane Lovell MD

## 2024-05-21 NOTE — PLAN OF CARE
A&Ox4. RA. Hemodialysis completed this morning. HD done today, per HD RN pt educated on fluid overload, potassium levels, and fluid intake. Education reinforced at bedside by this RN. Denies pain. IV Ancef given today. Fall precautions in place, call light within reach.  Problem: Patient Centered Care  Goal: Patient preferences are identified and integrated in the patient's plan of care  Description: Interventions:  - What would you like us to know as we care for you? From home alone  - Provide timely, complete, and accurate information to patient/family  - Incorporate patient and family knowledge, values, beliefs, and cultural backgrounds into the planning and delivery of care  - Encourage patient/family to participate in care and decision-making at the level they choose  - Honor patient and family perspectives and choices  Outcome: Progressing     Problem: Diabetes/Glucose Control  Goal: Glucose maintained within prescribed range  Description: INTERVENTIONS:  - Monitor Blood Glucose as ordered  - Assess for signs and symptoms of hyperglycemia and hypoglycemia  - Administer ordered medications to maintain glucose within target range  - Assess barriers to adequate nutritional intake and initiate nutrition consult as needed  - Instruct patient on self management of diabetes  Outcome: Progressing     Problem: Patient/Family Goals  Goal: Patient/Family Long Term Goal  Description: Patient's Long Term Goal: determine source of pain    Interventions:  - imaging, microbiology  - See additional Care Plan goals for specific interventions  Outcome: Progressing  Goal: Patient/Family Short Term Goal  Description: Patient's Short Term Goal stop cough    Interventions:   - meds  - See additional Care Plan goals for specific interventions  Outcome: Progressing     Problem: PAIN - ADULT  Goal: Verbalizes/displays adequate comfort level or patient's stated pain goal  Description: INTERVENTIONS:  - Encourage pt to monitor pain and  request assistance  - Assess pain using appropriate pain scale  - Administer analgesics based on type and severity of pain and evaluate response  - Implement non-pharmacological measures as appropriate and evaluate response  - Consider cultural and social influences on pain and pain management  - Manage/alleviate anxiety  - Utilize distraction and/or relaxation techniques  - Monitor for opioid side effects  - Notify MD/LIP if interventions unsuccessful or patient reports new pain  - Anticipate increased pain with activity and pre-medicate as appropriate  Outcome: Progressing     Problem: METABOLIC/FLUID AND ELECTROLYTES - ADULT  Goal: Electrolytes maintained within normal limits  Description: INTERVENTIONS:  - Monitor labs and rhythm and assess patient for signs and symptoms of electrolyte imbalances  - Administer electrolyte replacement as ordered  - Monitor response to electrolyte replacements, including rhythm and repeat lab results as appropriate  - Fluid restriction as ordered  - Instruct patient on fluid and nutrition restrictions as appropriate  Outcome: Progressing  Goal: Hemodynamic stability and optimal renal function maintained  Description: INTERVENTIONS:  - Monitor labs and assess for signs and symptoms of volume excess or deficit  - Monitor intake, output and patient weight  - Monitor urine specific gravity, serum osmolarity and serum sodium as indicated or ordered  - Monitor response to interventions for patient's volume status, including labs, urine output, blood pressure (other measures as available)  - Encourage oral intake as appropriate  - Instruct patient on fluid and nutrition restrictions as appropriate  Outcome: Progressing

## 2024-05-21 NOTE — PAYOR COMM NOTE
5/21  CONTINUED STAY REVIEW    Payor: [employer]  Subscriber #:  [id number]  Authorization Number: [id number]    Admit date: 5/15/24  Admit time:  1:51 PM          MEDICATIONS ADMINISTERED IN LAST 1 DAY:  amLODIPine (Norvasc) tab 10 mg       Date Action Dose Route User    5/21/2024 1015 Given 10 mg Oral Srikanth Acevedo RN          atorvastatin (Lipitor) tab 80 mg       Date Action Dose Route User    5/20/2024 2139 Given 80 mg Oral Mariela Rock RN          bupivacaine 0.5%-EPINEPHrine 1:200,000 (Marcaine-EPINEPHrine) injection       Date Action Dose Route User    5/20/2024 1526 Given 10 mL Subcutaneous (Abdomen) Annetta Willis MD          ceFAZolin (Ancef) 1 g in dextrose 5% 100mL IVPB-ADD       Date Action Dose Route User    5/21/2024 1342 New Bag 1 g Intravenous Srikanth Acevedo RN          ceFAZolin (Ancef) 2g in 10mL IV syringe premix       Date Action Dose Route User    5/20/2024 1458 Given 2 g Intravenous Jill Mendoza CRNA          fentaNYL (Sublimaze) 50 mcg/mL injection       Date Action Dose Route User    5/20/2024 1452 Given 100 mcg Intravenous Jill Mendoza CRNA          glycopyrrolate (Robinul) 0.2 MG/ML injection       Date Action Dose Route User    5/20/2024 1529 Given 0.4 mg Intravenous Angelita Valle CRNA          heparin (Porcine) 5000 UNIT/ML injection 5,000 Units       Date Action Dose Route User    5/21/2024 1342 Given 5,000 Units Subcutaneous (Right Upper Arm) Srikanth Acevedo RN    5/21/2024 0530 Given 5,000 Units Subcutaneous (Right Lower Abdomen) Mariela Rock RN    5/20/2024 2139 Given 5,000 Units Subcutaneous (Left Upper Arm) Mariela Rock RN          HYDROcodone-acetaminophen (Norco) 5-325 MG per tab 1 tablet       Date Action Dose Route User    5/21/2024 0543 Given 1 tablet Oral Mariela Rock RN    5/20/2024 2144 Given 1 tablet Oral Mariela Rock RN          insulin aspart (NovoLOG) 100 Units/mL FlexPen 1-7 Units       Date Action Dose  Route User    5/21/2024 1342 Given 2 Units Subcutaneous (Left Upper Arm) Srikanth Acevedo RN    5/20/2024 2139 Given 1 Units Subcutaneous (Left Upper Arm) Mariela Rock RN          iron sucrose (Venofer) 20 mg/mL injection 200 mg       Date Action Dose Route User    5/21/2024 1016 New Bag 200 mg Intravenous Srikanth Acevedo RN    5/20/2024 1743 New Bag 200 mg Intravenous Mercedes Jo RN          lidocaine PF (Xylocaine-MPF) 1% injection       Date Action Dose Route User    5/20/2024 1452 Given 40 mg Intravenous Jill Mendoza CRNA          metoprolol succinate ER (Toprol XL) 24 hr tab 100 mg       Date Action Dose Route User    5/21/2024 1014 Given 100 mg Oral Srikanth Acevedo RN          metoprolol succinate ER (Toprol XL) 24 hr tab 25 mg       Date Action Dose Route User    5/21/2024 1015 Given 25 mg Oral Srikanth Acevedo RN          neostigmine (Bloxiverz) 10 mg/10mL injection       Date Action Dose Route User    5/20/2024 1529 Given 3 mg Intravenous Angelita Valle CRNA          ondansetron (Zofran) 4 MG/2ML injection       Date Action Dose Route User    5/20/2024 1525 Given 4 mg Intravenous Angelita Valle CRNA          potassium chloride (Klor-Con M20) tab 20 mEq       Date Action Dose Route User    5/20/2024 1743 Given 20 mEq Oral Mercedes Jo RN          propofol (Diprivan) 10 MG/ML injection       Date Action Dose Route User    5/20/2024 1452 Given 200 mg Intravenous Jill Mendoza CRNA          rocuronium (Zemuron) 50 mg/5mL injection       Date Action Dose Route User    5/20/2024 1452 Given 30 mg Intravenous Jill Mendoza CRNA          sevelamer carbonate (Renvela) tab 2,400 mg       Date Action Dose Route User    5/21/2024 1014 Given 2,400 mg Oral Srikanth Acevedo RN    5/20/2024 1839 Given 2,400 mg Oral Mercedes oJ RN          sodium chloride 0.9% infusion       Date Action Dose Route User    5/20/2024 1446 New Bag (none) Intravenous Jill Mendoza, CRNA             Vitals (last day)       Date/Time Temp Pulse Resp BP SpO2 Weight O2 Device O2 Flow Rate (L/min) [facility]    05/21/24 1009 98.7 °F (37.1 °C) 100 18 136/88 94 % -- None (Room air) -- CM    05/21/24 0528 99.2 °F (37.3 °C) 89 16 132/81 96 % 188 lb 3.2 oz None (Room air) -- PK    05/20/24 2135 99.4 °F (37.4 °C) 83 16 135/76 93 % -- None (Room air) -- PK    05/20/24 1744 98.6 °F (37 °C) 75 14 147/90 97 % -- Nasal cannula 1 L/min     05/20/24 1624 97.7 °F (36.5 °C) 76 12 134/88 96 % -- Nasal cannula 2 L/min     05/20/24 1607 97.9 °F (36.6 °C) 74 10 142/85 97 % -- Nasal cannula 2 L/min VT    05/20/24 1557 -- 73 10 130/83 99 % -- Nasal cannula 2 L/min VT    05/20/24 1547 -- -- -- -- -- -- Nasal cannula 2 L/min VT    05/20/24 1547 98 °F (36.7 °C) 74 16 139/84 98 % -- -- --     05/20/24 1314 97.9 °F (36.6 °C) 76 18 142/83 96 % -- None (Room air) -- CP    05/20/24 1258 98.4 °F (36.9 °C) 77 18 153/92 96 % -- None (Room air) -- PITER    05/20/24 0954 97.4 °F (36.3 °C) 75 18 146/88 94 % -- None (Room air) -- PITER    05/20/24 0901 98.4 °F (36.9 °C) 80 18 158/93 98 % -- None (Room air) -- PITER    05/20/24 0603 98.5 °F (36.9 °C) 83 16 158/90 97 % -- None (Room air) -- PK    05/20/24 0438 -- -- -- -- -- 182 lb 3.2 oz -- -- LV       5/21 ID NOTE      ASSESSMENT:     Antibiotics: Ancef  Cefepime, vancomycin     # Acute peritonitis in setting of PD catheter, cx  Staph aureus               -PD catheter placed 9/2023, s/p removal 5/20  # ESRD on PD  # Acute fever  # Diabetes mellitus     PLAN:  -  Continue on ancef post-HD for three week course (EOT 6/10/24).  -  Follow fever curve, wbc.  -  Reviewed labs, micro, imaging reports, available old records.  -  Case d/w patient, RN.     History of Present Illness:  Gorge Alejandre is a 58 year old male with a history of diabetes mellitus, ESRD on PD since 11/2023, who presented to [facility] ED on 5/15 with worsening abdominal pain. Patient states that he has been feeling unwell for the last month with  ongoing abdominal pain. Started having fevers off and on for the last two weeks as high as 102. Did notice that his dialysate fluid was cloudy on Tuesday. On arrival, afebrile, wbc 9.7, PD fluid with 2722 wbcs, 91% PMNS, cx GPC in clusters, started on vancomycin and cefepime. Notes ongoing abdominal pain. ID consulted.     Physical Exam:  /88 (BP Location: Right arm)   Pulse 100   Temp 98.7 °F (37.1 °C) (Oral)   Resp 18   Wt 188 lb 3.2 oz (85.4 kg)   SpO2 94%   BMI 28.62 kg/m²     5/21 NEPHOLOGY NOTE       HPI:   Gorge Alejandre is a 58 year old male.     Had HD this am.  Tolerated well.  Abd pain much better.        PHYSICAL EXAM:   Temp:  [97.7 °F (36.5 °C)-99.4 °F (37.4 °C)] 98.7 °F (37.1 °C)  Pulse:  [] 100  Resp:  [10-18] 18  BP: (130-153)/(76-92) 136/88  SpO2:  [93 %-99 %] 94 %            Recent Labs   Lab 05/19/24  0639 05/20/24  0701 05/21/24  0741   WBC 11.9* 13.5* 13.5*   HGB 8.8* 9.5* 8.7*   HCT 24.2* 26.4* 25.1*   .0 408.0 384.0              Recent Labs   Lab 05/15/24  1110 05/16/24  0638 05/19/24  0639 05/20/24  0701 05/21/24  0741   *   < > 117* 101* 89   BUN 32*   < > 54* 63* 36*   CREATSERUM 9.62*   < > 11.79* 12.42* 7.39*   CA 8.5*   < > 8.0* 8.2* 7.8*   ALB 4.0  --   --  3.6 3.3   *   < > 130* 133* 135*   K 2.7*   < > 2.8* 3.2* 3.7   CL 92*   < > 96* 96* 100   CO2 30.0   < > 27.0 26.0 26.0   ALKPHO 119*  --   --  271* 216*   AST 45*  --   --  60* 33   ALT 54*  --   --  97* 21   BILT 0.5  --   --  0.2* 0.3   TP 7.7  --   --  7.1 6.4   PHOS 5.0  --   --  2.8 2.4          ASSESSMENT/PLAN:      Assessment      Patient Active Problem List   Diagnosis    Controlled type 2 diabetes mellitus without complication, without long-term current use of insulin ([facility])    Primary hypertension    Diastolic dysfunction without heart failure    Hypercholesteremia    Pseudophakia of both eyes    Presbyopia    Colon cancer screening    Anemia    Gastroesophageal reflux disease with  esophagitis without hemorrhage    Polyp of ascending colon    Acute renal failure superimposed on chronic kidney disease, unspecified CKD stage, unspecified acute renal failure type    Normocytic anemia    Metabolic acidosis    Uremia    Postprandial diarrhea    End stage renal disease (HCC)    Abdominal pain, acute    Peritoneal dialysis catheter in place (HCC)    SBP (spontaneous bacterial peritonitis) (HCC)    Secondary hyperparathyroidism (HCC)    ESRD (end stage renal disease) on dialysis (HCC)    Peritonitis (HCC)         Overall better. Out pt HD being arranged.  Next HD will be on Thurs. Repeat Vanco dose today.  Home soon if OK with others.      Summary: Discharge planning     SW uploaded HD access device, Hep B profile lab into [facility] referral via aidin. SW received a phone call from Nataliya at [facility] yesterday informing that they would like to have 3 HD flowsheets and further clarification if HD will be permanent or temporarily for patient due to patient being on PD prior to HD. JOSIAH will reach out to team for clarification.      1056am- JOSIAH updated PD flowsheets into [employer] and faxed to [facility] fax number. SW was informed that patient has a schedule of T TH S at 5am at [facility]. Patient confirmed that HD will be temporarily     1226pm- JOSIAH faxed over one HD flowsheet to [facility] via [employer]. SW was informed that patient needs IV ABX post HD. JOSIAH called and fax IV ABX RX to - [phone].         Plan: Pending medical clearance, DC to Home with HD at [facility] [place]- T TH S @ 5am [facility] * confirm IV ABX, *confirm how many flowsheets     SW/CM to remain available for support and/or discharge planning.

## 2024-05-21 NOTE — PLAN OF CARE
No acute changes overnight. Plan for HD in AM. Safety precautions in place, call light within reach  Problem: Patient Centered Care  Goal: Patient preferences are identified and integrated in the patient's plan of care  Description: Interventions:  - What would you like us to know as we care for you? From home alone  - Provide timely, complete, and accurate information to patient/family  - Incorporate patient and family knowledge, values, beliefs, and cultural backgrounds into the planning and delivery of care  - Encourage patient/family to participate in care and decision-making at the level they choose  - Honor patient and family perspectives and choices  Outcome: Progressing     Problem: Diabetes/Glucose Control  Goal: Glucose maintained within prescribed range  Description: INTERVENTIONS:  - Monitor Blood Glucose as ordered  - Assess for signs and symptoms of hyperglycemia and hypoglycemia  - Administer ordered medications to maintain glucose within target range  - Assess barriers to adequate nutritional intake and initiate nutrition consult as needed  - Instruct patient on self management of diabetes  Outcome: Progressing     Problem: Patient/Family Goals  Goal: Patient/Family Long Term Goal  Description: Patient's Long Term Goal: determine source of pain    Interventions:  - imaging, microbiology  - See additional Care Plan goals for specific interventions  Outcome: Progressing  Goal: Patient/Family Short Term Goal  Description: Patient's Short Term Goal stop cough    Interventions:   - meds  - See additional Care Plan goals for specific interventions  Outcome: Progressing     Problem: PAIN - ADULT  Goal: Verbalizes/displays adequate comfort level or patient's stated pain goal  Description: INTERVENTIONS:  - Encourage pt to monitor pain and request assistance  - Assess pain using appropriate pain scale  - Administer analgesics based on type and severity of pain and evaluate response  - Implement  non-pharmacological measures as appropriate and evaluate response  - Consider cultural and social influences on pain and pain management  - Manage/alleviate anxiety  - Utilize distraction and/or relaxation techniques  - Monitor for opioid side effects  - Notify MD/LIP if interventions unsuccessful or patient reports new pain  - Anticipate increased pain with activity and pre-medicate as appropriate  Outcome: Progressing     Problem: METABOLIC/FLUID AND ELECTROLYTES - ADULT  Goal: Electrolytes maintained within normal limits  Description: INTERVENTIONS:  - Monitor labs and rhythm and assess patient for signs and symptoms of electrolyte imbalances  - Administer electrolyte replacement as ordered  - Monitor response to electrolyte replacements, including rhythm and repeat lab results as appropriate  - Fluid restriction as ordered  - Instruct patient on fluid and nutrition restrictions as appropriate  Outcome: Progressing  Goal: Hemodynamic stability and optimal renal function maintained  Description: INTERVENTIONS:  - Monitor labs and assess for signs and symptoms of volume excess or deficit  - Monitor intake, output and patient weight  - Monitor urine specific gravity, serum osmolarity and serum sodium as indicated or ordered  - Monitor response to interventions for patient's volume status, including labs, urine output, blood pressure (other measures as available)  - Encourage oral intake as appropriate  - Instruct patient on fluid and nutrition restrictions as appropriate  Outcome: Progressing

## 2024-05-22 VITALS
OXYGEN SATURATION: 96 % | BODY MASS INDEX: 29 KG/M2 | RESPIRATION RATE: 16 BRPM | WEIGHT: 188.19 LBS | DIASTOLIC BLOOD PRESSURE: 80 MMHG | HEART RATE: 82 BPM | TEMPERATURE: 100 F | SYSTOLIC BLOOD PRESSURE: 141 MMHG

## 2024-05-22 LAB
GLUCOSE BLDC GLUCOMTR-MCNC: 116 MG/DL (ref 70–99)
GLUCOSE BLDC GLUCOMTR-MCNC: 168 MG/DL (ref 70–99)

## 2024-05-22 PROCEDURE — 99232 SBSQ HOSP IP/OBS MODERATE 35: CPT | Performed by: INTERNAL MEDICINE

## 2024-05-22 PROCEDURE — 99239 HOSP IP/OBS DSCHRG MGMT >30: CPT | Performed by: HOSPITALIST

## 2024-05-22 RX ORDER — BENZONATATE 100 MG/1
100 CAPSULE ORAL 3 TIMES DAILY PRN
Qty: 12 CAPSULE | Refills: 0 | Status: SHIPPED | OUTPATIENT
Start: 2024-05-22

## 2024-05-22 RX ORDER — ACETAMINOPHEN 500 MG
500 TABLET ORAL EVERY 6 HOURS PRN
Status: SHIPPED | COMMUNITY
Start: 2024-05-22

## 2024-05-22 NOTE — PLAN OF CARE
Patient alert and oriented. Patient to DC home this evening. IV removed. AVS reviewed with patient. No questions or concerns at this time. Patient sent home with paperwork and belongings. Patient escorted to entrance via nursing staff.     Problem: Patient Centered Care  Goal: Patient preferences are identified and integrated in the patient's plan of care  Description: Interventions:  - What would you like us to know as we care for you? From home alone  - Provide timely, complete, and accurate information to patient/family  - Incorporate patient and family knowledge, values, beliefs, and cultural backgrounds into the planning and delivery of care  - Encourage patient/family to participate in care and decision-making at the level they choose  - Honor patient and family perspectives and choices  Outcome: Adequate for Discharge     Problem: Diabetes/Glucose Control  Goal: Glucose maintained within prescribed range  Description: INTERVENTIONS:  - Monitor Blood Glucose as ordered  - Assess for signs and symptoms of hyperglycemia and hypoglycemia  - Administer ordered medications to maintain glucose within target range  - Assess barriers to adequate nutritional intake and initiate nutrition consult as needed  - Instruct patient on self management of diabetes  Outcome: Adequate for Discharge     Problem: Patient/Family Goals  Goal: Patient/Family Long Term Goal  Description: Patient's Long Term Goal: determine source of pain    Interventions:  - imaging, microbiology  - See additional Care Plan goals for specific interventions  Outcome: Adequate for Discharge  Goal: Patient/Family Short Term Goal  Description: Patient's Short Term Goal stop cough    Interventions:   - meds  - See additional Care Plan goals for specific interventions  Outcome: Adequate for Discharge     Problem: PAIN - ADULT  Goal: Verbalizes/displays adequate comfort level or patient's stated pain goal  Description: INTERVENTIONS:  - Encourage pt to  monitor pain and request assistance  - Assess pain using appropriate pain scale  - Administer analgesics based on type and severity of pain and evaluate response  - Implement non-pharmacological measures as appropriate and evaluate response  - Consider cultural and social influences on pain and pain management  - Manage/alleviate anxiety  - Utilize distraction and/or relaxation techniques  - Monitor for opioid side effects  - Notify MD/LIP if interventions unsuccessful or patient reports new pain  - Anticipate increased pain with activity and pre-medicate as appropriate  Outcome: Adequate for Discharge     Problem: METABOLIC/FLUID AND ELECTROLYTES - ADULT  Goal: Electrolytes maintained within normal limits  Description: INTERVENTIONS:  - Monitor labs and rhythm and assess patient for signs and symptoms of electrolyte imbalances  - Administer electrolyte replacement as ordered  - Monitor response to electrolyte replacements, including rhythm and repeat lab results as appropriate  - Fluid restriction as ordered  - Instruct patient on fluid and nutrition restrictions as appropriate  Outcome: Adequate for Discharge  Goal: Hemodynamic stability and optimal renal function maintained  Description: INTERVENTIONS:  - Monitor labs and assess for signs and symptoms of volume excess or deficit  - Monitor intake, output and patient weight  - Monitor urine specific gravity, serum osmolarity and serum sodium as indicated or ordered  - Monitor response to interventions for patient's volume status, including labs, urine output, blood pressure (other measures as available)  - Encourage oral intake as appropriate  - Instruct patient on fluid and nutrition restrictions as appropriate  Outcome: Adequate for Discharge

## 2024-05-22 NOTE — DISCHARGE SUMMARY
Dc summary#[id number]  > 30 min spent on dc    Hospital Discharge Diagnoses: sbp with dialysis catheter malfunction  Lace+ Score: 64  59-90 High Risk  29-58 Medium Risk  0-28   Low Risk.    TCM Follow-Up Recommendation:  LACE > 58: High Risk of readmission after discharge from the hospital.

## 2024-05-22 NOTE — PROGRESS NOTES
[facility]  part of [facility]    Progress Note    Gorge Alejandre Patient Status:  Inpatient    1965 MRN [id number]   Location [facility] 5SW/SE Attending Andi Randall,*   Hosp Day # 7 PCP Jatinder Hein MD       Subjective:   S/p removal of peritoneal dialysis catheter. Pt feeling well, pain controlled.    Objective:   Vital Signs:  Blood pressure 141/80, pulse 82, temperature 99.6 °F (37.6 °C), temperature source Oral, resp. rate 16, weight 188 lb 3.2 oz (85.4 kg), SpO2 96%.    Physical Exam     General: No acute distress. Alert and oriented x 3.  HEENT: Moist mucous membranes. Anicteric.   Neck: Supple, No JVD.   Respiratory: Nonlabored resp.  Cardiovascular: Regular rate.   Abdomen: Soft, expected incisional tenderness, no rebound tnd, no guarding, nondistended.  No masses. Normal bowel sounds. Dressing c/d/i  Neurologic: No focal neurological deficits.  Musculoskeletal: Full range of motion of all extremities. No calf tenderness. No swelling noted.  Integument: No lesions. No erythema.  Psychiatric: Appropriate mood and affect.         Assessment and Plan:     Abdominal pain, acute      Peritoneal dialysis catheter in place (HCC)      SBP (spontaneous bacterial peritonitis) (HCC)      Secondary hyperparathyroidism (HCC)      ESRD (end stage renal disease) on dialysis (HCC)      Peritonitis (HCC)    Pt tolerated catheter removal, surgically recovering well. We will sign off. Reach out for any new or worsening surgical concerns.    Results:     Lab Results   Component Value Date    WBC 13.5 (H) 2024    HGB 8.7 (L) 2024    HCT 25.1 (L) 2024    .0 2024    CREATSERUM 7.39 (H) 2024    BUN 36 (H) 2024     (L) 2024    K 3.7 2024     2024    CO2 26.0 2024    GLU 89 2024    CA 7.8 (L) 2024    ALB 3.3 2024    ALKPHO 216 (H) 2024    BILT 0.3 2024    TP 6.4  05/21/2024    AST 33 05/21/2024    ALT 21 05/21/2024    PTT 39.9 (H) 05/19/2024    INR 1.07 05/19/2024    TSH 1.870 08/01/2023    PSA 0.9 02/10/2017    CRP <0.29 08/01/2023    MG 1.7 05/21/2024    PHOS 2.4 05/21/2024    B12 585 08/01/2023       No results found.                Frederic Crockett PA-C  5/22/2024

## 2024-05-22 NOTE — PROGRESS NOTES
INFECTIOUS DISEASE PROGRESS NOTE  [facility]  part of [facility] ID PROGRESS NOTE    Gorge Alejandre Patient Status:  Inpatient    1965 MRN [id number]   Location [facility] 5SW/SE Attending Francie Arizmendi, DO   Hosp Day # 7 PCP Jatinder Hein MD     Subjective:  ROS reviewed. Tmax 100.2 last night. Having less abdominal pain. Feels well this AM.    ASSESSMENT:    Antibiotics: Ancef  Cefepime, vancomycin     # Acute peritonitis in setting of PD catheter, cx  Staph aureus               -PD catheter placed 2023, s/p removal   # ESRD on PD  # Acute fever  # Diabetes mellitus     PLAN:  -  Continue on ancef post-HD for three week course (EOT 6/10/24).  -  Follow fever curve, wbc.  -  Reviewed labs, micro, imaging reports, available old records.  -  Case d/w patient, RN.     History of Present Illness:  Gorge Alejandre is a 58 year old male with a history of diabetes mellitus, ESRD on PD since 2023, who presented to [facility] ED on 5/15 with worsening abdominal pain. Patient states that he has been feeling unwell for the last month with ongoing abdominal pain. Started having fevers off and on for the last two weeks as high as 102. Did notice that his dialysate fluid was cloudy on Tuesday. On arrival, afebrile, wbc 9.7, PD fluid with 2722 wbcs, 91% PMNS, cx GPC in clusters, started on vancomycin and cefepime. Notes ongoing abdominal pain. ID consulted.    Physical Exam:  /80 (BP Location: Right arm)   Pulse 82   Temp 99.6 °F (37.6 °C) (Oral)   Resp 16   Wt 188 lb 3.2 oz (85.4 kg)   SpO2 96%   BMI 28.62 kg/m²     Gen:   Awake, in bed  HEENT:  EOMI, neck supple  CV/lungs:  Regular rate and rhythm, CTAB  Abdom:  Soft, PD catheter site covered  Skin/extrem:  No rashes, no c/c/e  Lines:  PIV+, RIJ permcath    Laboratory Data: Reviewed    Microbiology: Reviewed    Radiology: Reviewed      [facility]  [phone]  5/22/2024

## 2024-05-22 NOTE — PLAN OF CARE
Problem: Patient Centered Care  Goal: Patient preferences are identified and integrated in the patient's plan of care  Description: Interventions:  - What would you like us to know as we care for you? From home alone  - Provide timely, complete, and accurate information to patient/family  - Incorporate patient and family knowledge, values, beliefs, and cultural backgrounds into the planning and delivery of care  - Encourage patient/family to participate in care and decision-making at the level they choose  - Honor patient and family perspectives and choices  Outcome: Progressing     Problem: Diabetes/Glucose Control  Goal: Glucose maintained within prescribed range  Description: INTERVENTIONS:  - Monitor Blood Glucose as ordered  - Assess for signs and symptoms of hyperglycemia and hypoglycemia  - Administer ordered medications to maintain glucose within target range  - Assess barriers to adequate nutritional intake and initiate nutrition consult as needed  - Instruct patient on self management of diabetes  Outcome: Progressing     Problem: Patient/Family Goals  Goal: Patient/Family Long Term Goal  Description: Patient's Long Term Goal: determine source of pain    Interventions:  - imaging, microbiology  - See additional Care Plan goals for specific interventions  Outcome: Progressing  Goal: Patient/Family Short Term Goal  Description: Patient's Short Term Goal stop cough    Interventions:   - meds  - See additional Care Plan goals for specific interventions  Outcome: Progressing     Problem: PAIN - ADULT  Goal: Verbalizes/displays adequate comfort level or patient's stated pain goal  Description: INTERVENTIONS:  - Encourage pt to monitor pain and request assistance  - Assess pain using appropriate pain scale  - Administer analgesics based on type and severity of pain and evaluate response  - Implement non-pharmacological measures as appropriate and evaluate response  - Consider cultural and social influences on  pain and pain management  - Manage/alleviate anxiety  - Utilize distraction and/or relaxation techniques  - Monitor for opioid side effects  - Notify MD/LIP if interventions unsuccessful or patient reports new pain  - Anticipate increased pain with activity and pre-medicate as appropriate  Outcome: Progressing     Problem: METABOLIC/FLUID AND ELECTROLYTES - ADULT  Goal: Electrolytes maintained within normal limits  Description: INTERVENTIONS:  - Monitor labs and rhythm and assess patient for signs and symptoms of electrolyte imbalances  - Administer electrolyte replacement as ordered  - Monitor response to electrolyte replacements, including rhythm and repeat lab results as appropriate  - Fluid restriction as ordered  - Instruct patient on fluid and nutrition restrictions as appropriate  Outcome: Progressing  Goal: Hemodynamic stability and optimal renal function maintained  Description: INTERVENTIONS:  - Monitor labs and assess for signs and symptoms of volume excess or deficit  - Monitor intake, output and patient weight  - Monitor urine specific gravity, serum osmolarity and serum sodium as indicated or ordered  - Monitor response to interventions for patient's volume status, including labs, urine output, blood pressure (other measures as available)  - Encourage oral intake as appropriate  - Instruct patient on fluid and nutrition restrictions as appropriate  Outcome: Progressing

## 2024-05-22 NOTE — PROGRESS NOTES
[facility]  Nephrology Daily Progress Note    Gorge Alejandre  [id number]  58 year old      HPI:   Gorge Alejandre is a 58 year old male.  Overall feeling well.  Eating and ambulating well.  No abd pain.        ROS:     Constitutional:  Negative for decreased activity, fever, irritability and lethargy  Endocrine:  Negative for abnormal sleep patterns, increased activity, polydipsia and polyphagia  Cardiovascular:  Negative for cool extremity and irregular heartbeat/palpitations  Gastrointestinal:  Negative for abdominal pain, constipation, decreased appetite, diarrhea and vomiting  Genitourinary:  Negative for dysuria and hematuria  Hema/Lymph:  Negative for easy bleeding and easy bruising  Integumentary:  Negative for pruritus and rash  Musculoskeletal:  Negative for bone/joint symptoms  Neurological:  Negative for gait disturbance  Psychiatric:  Negative for inappropriate interaction and psychiatric symptoms  Respiratory:  Negative for cough, dyspnea and wheezing      PHYSICAL EXAM:   Temp:  [99 °F (37.2 °C)-100.2 °F (37.9 °C)] 99.6 °F (37.6 °C)  Pulse:  [82-89] 82  Resp:  [16-18] 16  BP: (132-143)/(72-85) 141/80  SpO2:  [94 %-96 %] 96 %  Patient Weight for the past 72 hrs:   Weight   05/20/24 0438 182 lb 3.2 oz (82.6 kg)   05/21/24 0528 188 lb 3.2 oz (85.4 kg)       Constitutional: appears well hydrated alert and responsive no acute distress noted  Neck/Thyroid: neck is supple without adenopathy  Lymphatic: no abnormal cervical, supraclavicular or axillary adenopathy is noted  Respiratory: normal to inspection lungs are clear to auscultation bilaterally normal respiratory effort  Cardiovascular: regular rate and rhythm no murmurs, gallups, or rubs  Abdomen: soft non-tender non-distended no organomegaly noted no masses  Musculoskeletal: full ROM all extremities good strength  no deformities  Extremities: no edema, cyanosis, or clubbing  Neurological: exam appropriate for age reflexes and motor skills  appropriate for age    Labs:     Recent Labs   Lab 05/19/24  0639 05/20/24  0701 05/21/24  0741   WBC 11.9* 13.5* 13.5*   HGB 8.8* 9.5* 8.7*   HCT 24.2* 26.4* 25.1*   .0 408.0 384.0     Recent Labs   Lab 05/19/24  0639 05/20/24  0701 05/21/24  0741   * 101* 89   BUN 54* 63* 36*   CREATSERUM 11.79* 12.42* 7.39*   CA 8.0* 8.2* 7.8*   ALB  --  3.6 3.3   * 133* 135*   K 2.8* 3.2* 3.7   CL 96* 96* 100   CO2 27.0 26.0 26.0   ALKPHO  --  271* 216*   AST  --  60* 33   ALT  --  97* 21   BILT  --  0.2* 0.3   TP  --  7.1 6.4   PHOS  --  2.8 2.4       Imaging  IR TUNNELED CV CATH INSERTION EXCHANGE CHECK    Result Date: 5/20/2024  CONCLUSION: Placement of tunneled dialysis venous catheter via right internal jugular vein.    Dictated by (CST): Darion Olson MD on 5/20/2024 at 12:20 PM     Finalized by (CST): Darion Olson MD on 5/20/2024 at 12:21 PM             Medications:    Current Facility-Administered Medications:     ceFAZolin (Ancef) 1 g in dextrose 5% 100mL IVPB-ADD, 1 g, Intravenous, Q24H    benzocaine (Anbesol) 10% gel, , Mouth/Throat, Q6H PRN    iron sucrose (Venofer) 20 mg/mL injection 200 mg, 200 mg, Intravenous, Daily    sodium chloride 0.9 % IV bolus 100 mL, 100 mL, Intravenous, Q30 Min PRN **AND** albumin human (Albumin) 25% injection 25 g, 25 g, Intravenous, PRN Dialysis    heparin (Porcine) 1000 UNIT/ML injection 1,500 Units, 1.5 mL, Intracatheter, PRN Dialysis    epoetin anuja (Epogen, Procrit) 02419 UNIT/ML injection 10,000 Units, 10,000 Units, Subcutaneous, Once per day on Monday Wednesday Friday    heparin (Porcine) 1000 UNIT/ML injection 4,000 Units, 4 mL, Intracatheter, PRN Dialysis    heparin (Porcine) 5000 UNIT/ML injection 5,000 Units, 5,000 Units, Subcutaneous, Q8H KHOA    dextrose 1.5%-calcium 2.5 mEq/L peritoneal solution, 5,000 mL, Intraperitoneal, Once in dialysis    insulin aspart (NovoLOG) 100 Units/mL FlexPen 1-7 Units, 1-7 Units, Subcutaneous, TID CC and HS     glucose (Dex4) 15 GM/59ML oral liquid 15 g, 15 g, Oral, Q15 Min PRN **OR** glucose (Glutose) 40% oral gel 15 g, 15 g, Oral, Q15 Min PRN **OR** glucose-vitamin C (Dex-4) chewable tab 4 tablet, 4 tablet, Oral, Q15 Min PRN **OR** dextrose 50% injection 50 mL, 50 mL, Intravenous, Q15 Min PRN **OR** glucose (Dex4) 15 GM/59ML oral liquid 30 g, 30 g, Oral, Q15 Min PRN **OR** glucose (Glutose) 40% oral gel 30 g, 30 g, Oral, Q15 Min PRN **OR** glucose-vitamin C (Dex-4) chewable tab 8 tablet, 8 tablet, Oral, Q15 Min PRN    acetaminophen (Tylenol Extra Strength) tab 500 mg, 500 mg, Oral, Q4H PRN    ondansetron (Zofran) 4 MG/2ML injection 4 mg, 4 mg, Intravenous, Q6H PRN    prochlorperazine (Compazine) 10 MG/2ML injection 5 mg, 5 mg, Intravenous, Q8H PRN    HYDROmorphone (Dilaudid) 1 MG/ML injection 0.2 mg, 0.2 mg, Intravenous, Q2H PRN **OR** HYDROmorphone (Dilaudid) 1 MG/ML injection 0.4 mg, 0.4 mg, Intravenous, Q2H PRN **OR** HYDROmorphone (Dilaudid) 1 MG/ML injection 0.8 mg, 0.8 mg, Intravenous, Q2H PRN    acetaminophen (Tylenol) tab 650 mg, 650 mg, Oral, Q4H PRN **OR** HYDROcodone-acetaminophen (Norco) 5-325 MG per tab 1 tablet, 1 tablet, Oral, Q4H PRN **OR** HYDROcodone-acetaminophen (Norco) 5-325 MG per tab 2 tablet, 2 tablet, Oral, Q4H PRN    temazepam (Restoril) cap 15 mg, 15 mg, Oral, Nightly PRN    guaiFENesin (Robitussin) 100 MG/5 ML oral liquid 200 mg, 200 mg, Oral, Q4H PRN    amLODIPine (Norvasc) tab 10 mg, 10 mg, Oral, QAM    atorvastatin (Lipitor) tab 80 mg, 80 mg, Oral, Nightly    metoprolol succinate ER (Toprol XL) 24 hr tab 100 mg, 100 mg, Oral, Daily    metoprolol succinate ER (Toprol XL) 24 hr tab 25 mg, 25 mg, Oral, Daily    sevelamer carbonate (Renvela) tab 2,400 mg, 2,400 mg, Oral, TID CC    dextrose 1.5%-calcium 2.5 mEq/L peritoneal solution, 5,000 mL, Intraperitoneal, Once in dialysis    benzocaine-menthol (Cepacol) lozenge 1 lozenge, 1 lozenge, Oral, PRN    benzonatate (Tessalon) cap 100 mg, 100 mg,  Oral, TID PRN    Allergies:  Allergies   Allergen Reactions    Losartan Coughing     Tolerated in 2023       Input/Output:    Intake/Output Summary (Last 24 hours) at 5/22/2024 1347  Last data filed at 5/22/2024 1001  Gross per 24 hour   Intake 480 ml   Output 200 ml   Net 280 ml          ASSESSMENT/PLAN:   Assessment   Patient Active Problem List   Diagnosis    Controlled type 2 diabetes mellitus without complication, without long-term current use of insulin (HCC)    Primary hypertension    Diastolic dysfunction without heart failure    Hypercholesteremia    Pseudophakia of both eyes    Presbyopia    Colon cancer screening    Anemia    Gastroesophageal reflux disease with esophagitis without hemorrhage    Polyp of ascending colon    Acute renal failure superimposed on chronic kidney disease, unspecified CKD stage, unspecified acute renal failure type    Normocytic anemia    Metabolic acidosis    Uremia    Postprandial diarrhea    End stage renal disease (HCC)    Abdominal pain, acute    Peritoneal dialysis catheter in place (HCC)    SBP (spontaneous bacterial peritonitis) (HCC)    Secondary hyperparathyroidism (HCC)    ESRD (end stage renal disease) on dialysis (HCC)    Peritonitis (HCC)       Overall stable from renal stand point.  Out pt HD scheduled for T,T,S with out pt Ab.  Ok from renal stand point for discharge home.  Discussed with RN.             5/22/2024  Zane Lovell MD

## 2024-05-22 NOTE — SPIRITUAL CARE NOTE
Spiritual Care Visit Note    Patient Name: Gorge Alejandre Date of Spiritual Care Visit: 24   : 1965 Primary Dx: Abdominal pain, acute       Referred By: Referral From:     Spiritual Care Taxonomy:    Intended Effects: Demonstrate caring and concern    Methods: Collaborate with care team member;Offer support    Interventions: Active listening;Ask guided questions;Silent prayer    Visit Type/Summary:     - Spiritual Care: Offered empathic listening and emotional support. Patient and family expressed appreciation for  visit. Provided information regarding how to contact Spiritual Care and left a Spiritual Care information card.    Spiritual Care support can be requested via an Epic consult. For urgent/immediate needs, please contact the On Call  at: [facility]: ext [phone]    MIGUEL Caraballo CAMII   [phone]

## 2024-05-23 ENCOUNTER — TELEPHONE (OUTPATIENT)
Dept: INTERNAL MEDICINE CLINIC | Facility: CLINIC | Age: 59
End: 2024-05-23

## 2024-05-23 ENCOUNTER — PATIENT OUTREACH (OUTPATIENT)
Dept: CASE MANAGEMENT | Age: 59
End: 2024-05-23

## 2024-05-23 DIAGNOSIS — Z02.9 ENCOUNTERS FOR UNSPECIFIED ADMINISTRATIVE PURPOSE: Primary | ICD-10-CM

## 2024-05-23 NOTE — TELEPHONE ENCOUNTER
Called pt advised of Dr. Horton's response. Pt gave a verbal understanding.     Pt states wanting to let Dr. Horton know that does not have an appetite, no energy, and headache.    Patient called and informed with understanding. 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 Appointment made for 8/17/18.

## 2024-05-23 NOTE — PROGRESS NOTES
Attempted to reach the patient to complete a Transitional Care Management-Hospital follow up call. Left a message for the patient to call the nurse care manager back at, [phone].

## 2024-05-23 NOTE — TELEPHONE ENCOUNTER
Attempted to contact patient for TCM today however no answer.  Patient does not have an appointment scheduled at this time.  TCM appointment recommended by 5/29/24 as patient is a High risk for readmission.  Please advise.    BOOK BY DATE (last date for TCM): 6/5/24    Clinical staff:  Please follow-up with patient and try to get them to schedule as patient would greatly benefit from TCM appointment.  Thank you!

## 2024-05-23 NOTE — TELEPHONE ENCOUNTER
Spoke with patient, verified , patient states he has dialysis on  and  and would like to come in on one of those days after 10:30am, Dr Hein please advise if it is possible to see patient for a TCM appointment on Tuesday or Thursday after 10:30am.

## 2024-05-23 NOTE — PROGRESS NOTES
LM for pt to call [facility] for TCM since discharge. [facility] phone number was provided for pt to call back.    TE will be sent to PCP office to FU on HFU appt as pt is a high risk for readmission.

## 2024-05-23 NOTE — DISCHARGE SUMMARY
[facility]    PATIENT'S NAME: NIKKY CHU   ATTENDING PHYSICIAN: Andi Randall MD   PATIENT ACCOUNT#:   [id number]    LOCATION:  [address] [place] [address]  MEDICAL RECORD #:   [id number]       YOB: 1965  ADMISSION DATE:       05/15/2024      DISCHARGE DATE:  05/22/2024    DISCHARGE SUMMARY      About 35 minutes were spent preparing this discharge summary.    DISCHARGE DIAGNOSIS:  Spontaneous bacterial peritonitis with dialysis catheter malfunction.    HISTORY AND HOSPITAL COURSE:  This is a very pleasant 58-year-old  male appears younger than his stated age who presents with a history of abdominal pain.  He was thought to have spontaneous bacterial peritonitis, and he was admitted to the hospital.  He was evaluated by Surgery and Dr. Willis removed the peritoneal dialysis catheter, and he was seen by Dr. Lovell as well as Infectious Diseases.  Dr. Lovell initiated hemodialysis and Infectious Diseases set up a 3-week course of antibiotics because of the Staphylococcus aureus sensitive culture; it was Ancef.  Patient did well.  He has had no abdominal pain.  He was walking around.  We discharged him home.    PHYSICAL EXAMINATION ON DISCHARGE:    VITAL SIGNS:  Temperature 99.6, pulse is 82, respiratory rate is 16, blood pressure is 141/80, 96%.  LUNGS:  Occasional rhonchi.  HEART:  Normal S1, S2.  No S3.    ABDOMEN:  Soft and nontender.  EXTREMITIES:  Without edema.    NEUROLOGIC:  He is alert and oriented, friendly and cooperative.     LABORATORY STUDIES:  Please see chart.    ASSESSMENT AND PLAN:    1.   Spontaneous bacterial peritonitis.  Infected dialysis catheter removed.  Continue Ancef as per Infectious Disease.  2.   End-stage renal disease.  Continue dialysis.  Set up as an outpatient.    3.   Diabetes mellitus.  4.   Right internal jugular deep venous thrombosis from catheter.  Patient had it stopped by Nephrology per patient.  He did not continue it, and so I did not  continue it as an outpatient.  5.   DVT prophylaxis with heparin.    CONDITION ON DISCHARGE:  Stable.    CODE STATUS:  Full Code.    DIET:  Low fat, low salt, 2000-calorie ADA.    ACTIVITY:  As tolerated.    FOLLOWUP:  With Dr. Hein in a few days for followup advice; Dr. Lovell in 2 weeks; and Infectious Disease, Ms. Brianna Villarreal, in 2 weeks.  Follow up with Dr. Willis 1 week and for all wound care and activity orders.  Follow up with dialysis Tuesday, Thursday, and Saturday.  Catheter care is per Dr. Willis.    DISCHARGE MEDICATIONS:    1.   Stop Eliquis for now.  Patient said it was stopped by his nephrologist several months ago.  2.   Vitamin D 1 capsule twice a week.  3.   Amlodipine 10 mg daily.  4.   Atorvastatin 80 mg nightly.  Watch for muscle weakness.  5.   Lopressor 125 mg daily long acting.  6.   Renvela 2400 mg daily.  7.   Tylenol 500 mg every 6 hours as needed.  Watch total daily Tylenol, limit to 3 g.  8.   Benzocaine mouth gel.  9.   Tessalon Perles 100 mg 3 times a day as needed for cough.  10.   Guaifenesin 200 mg every 6 hours as needed.  11.   Ancef 2 g on dialysis days to [place] 10.    RISK OF READMISSION:  High.  TCM followup is definitely recommended.     Dictated By Andi Randall MD  d: 05/22/2024 19:44:52  t: 05/22/2024 20:55:30  Job [id number]  [facility]/

## 2024-05-23 NOTE — PAYOR COMM NOTE
--------------  DISCHARGE REVIEW    Payor: [facility] OUT OF STATE PPO  Subscriber #:  [id number]  Authorization Number: [id number]    Admit date: 5/15/24  Admit time:   1:51 PM  Discharge Date: 5/22/2024  5:52 PM     Admitting Physician:   Attending Physician:  Marielle att. providers found  Primary Care Physician: Jatinder Hein MD          Discharge Summary Notes        Discharge Summary signed by Andi Randall MD at 5/23/2024  8:27 AM        Author: nAdi Randall MD Specialty: HOSPITALIST Author Type: Physician    Filed: 5/23/2024  8:27 AM Status: Signed    : Andi Randall MD (Physician)         DISCHARGE SUMMARY    NIKKY CHU [id number]   YOB: 1965 [id number]         [facility]    PATIENT'S NAME: NIKKY CHU   ATTENDING PHYSICIAN: Andi Randall MD   PATIENT ACCOUNT#:   [id number]    LOCATION:  [address] [place] [address]  MEDICAL RECORD #:   [id number]       YOB: 1965  ADMISSION DATE:       05/15/2024      DISCHARGE DATE:  05/22/2024    DISCHARGE SUMMARY      About 35 minutes were spent preparing this discharge summary.    DISCHARGE DIAGNOSIS:  Spontaneous bacterial peritonitis with dialysis catheter malfunction.    HISTORY AND HOSPITAL COURSE:  This is a very pleasant 58-year-old  male appears younger than his stated age who presents with a history of abdominal pain.  He was thought to have spontaneous bacterial peritonitis, and he was admitted to the hospital.  He was evaluated by Surgery and Dr. Willis removed the peritoneal dialysis catheter, and he was seen by Dr. Lovell as well as Infectious Diseases.  Dr. Lovell initiated hemodialysis and Infectious Diseases set up a 3-week course of antibiotics because of the Staphylococcus aureus sensitive culture; it was Ancef.  Patient did well.  He has had no abdominal pain.  He was walking around.  We discharged him home.    PHYSICAL EXAMINATION ON DISCHARGE:    VITAL  SIGNS:  Temperature 99.6, pulse is 82, respiratory rate is 16, blood pressure is 141/80, 96%.  LUNGS:  Occasional rhonchi.  HEART:  Normal S1, S2.  No S3.    ABDOMEN:  Soft and nontender.  EXTREMITIES:  Without edema.    NEUROLOGIC:  He is alert and oriented, friendly and cooperative.     LABORATORY STUDIES:  Please see chart.    ASSESSMENT AND PLAN:    1.   Spontaneous bacterial peritonitis.  Infected dialysis catheter removed.  Continue Ancef as per Infectious Disease.  2.   End-stage renal disease.  Continue dialysis.  Set up as an outpatient.    3.   Diabetes mellitus.  4.   Right internal jugular deep venous thrombosis from catheter.  Patient had it stopped by Nephrology per patient.  He did not continue it, and so I did not continue it as an outpatient.  5.   DVT prophylaxis with heparin.    CONDITION ON DISCHARGE:  Stable.    CODE STATUS:  Full Code.    DIET:  Low fat, low salt, 2000-calorie ADA.    ACTIVITY:  As tolerated.    FOLLOWUP:  With Dr. Hein in a few days for followup advice; Dr. Lovell in 2 weeks; and Infectious Disease, Ms. Brianna Villarreal, in 2 weeks.  Follow up with Dr. Willis 1 week and for all wound care and activity orders.  Follow up with dialysis Tuesday, Thursday, and Saturday.  Catheter care is per Dr. Willis.    DISCHARGE MEDICATIONS:    1.   Stop Eliquis for now.  Patient said it was stopped by his nephrologist several months ago.  2.   Vitamin D 1 capsule twice a week.  3.   Amlodipine 10 mg daily.  4.   Atorvastatin 80 mg nightly.  Watch for muscle weakness.  5.   Lopressor 125 mg daily long acting.  6.   Renvela 2400 mg daily.  7.   Tylenol 500 mg every 6 hours as needed.  Watch total daily Tylenol, limit to 3 g.  8.   Benzocaine mouth gel.  9.   Tessalon Perles 100 mg 3 times a day as needed for cough.  10.   Guaifenesin 200 mg every 6 hours as needed.  11.   Ancef 2 g on dialysis days to [place] 10.    RISK OF READMISSION:  High.  TCM followup is definitely recommended.      Dictated By Andi Randall MD  d: 05/22/2024 19:44:52  t: 05/22/2024 20:55:30  [facility] [id number]  [facility]/    Electronically signed by Andi Randall MD on 5/23/2024  8:27 AM         REVIEWER COMMENTS

## 2024-05-30 ENCOUNTER — OFFICE VISIT (OUTPATIENT)
Dept: INTERNAL MEDICINE CLINIC | Facility: CLINIC | Age: 59
End: 2024-05-30

## 2024-05-30 VITALS
SYSTOLIC BLOOD PRESSURE: 126 MMHG | DIASTOLIC BLOOD PRESSURE: 70 MMHG | WEIGHT: 187.69 LBS | HEART RATE: 73 BPM | TEMPERATURE: 98 F | OXYGEN SATURATION: 100 % | BODY MASS INDEX: 28.44 KG/M2 | HEIGHT: 68 IN

## 2024-05-30 DIAGNOSIS — N18.6 CONTROLLED TYPE 2 DIABETES MELLITUS WITH CHRONIC KIDNEY DISEASE ON CHRONIC DIALYSIS, WITHOUT LONG-TERM CURRENT USE OF INSULIN (HCC): ICD-10-CM

## 2024-05-30 DIAGNOSIS — Z99.2 CONTROLLED TYPE 2 DIABETES MELLITUS WITH CHRONIC KIDNEY DISEASE ON CHRONIC DIALYSIS, WITHOUT LONG-TERM CURRENT USE OF INSULIN (HCC): ICD-10-CM

## 2024-05-30 DIAGNOSIS — Z99.2 ESRD (END STAGE RENAL DISEASE) ON DIALYSIS (HCC): ICD-10-CM

## 2024-05-30 DIAGNOSIS — N18.6 ESRD (END STAGE RENAL DISEASE) ON DIALYSIS (HCC): ICD-10-CM

## 2024-05-30 DIAGNOSIS — E78.2 MIXED HYPERLIPIDEMIA: ICD-10-CM

## 2024-05-30 DIAGNOSIS — E11.22 CONTROLLED TYPE 2 DIABETES MELLITUS WITH CHRONIC KIDNEY DISEASE ON CHRONIC DIALYSIS, WITHOUT LONG-TERM CURRENT USE OF INSULIN (HCC): ICD-10-CM

## 2024-05-30 DIAGNOSIS — E11.3293 NON-PROLIFERATIVE DIABETIC RETINOPATHY, BOTH EYES (HCC): ICD-10-CM

## 2024-05-30 DIAGNOSIS — I10 ESSENTIAL HYPERTENSION: ICD-10-CM

## 2024-05-30 DIAGNOSIS — T85.71XD PERITONITIS ASSOCIATED WITH PERITONEAL DIALYSIS, SUBSEQUENT ENCOUNTER (HCC): Primary | ICD-10-CM

## 2024-05-30 PROCEDURE — 3078F DIAST BP <80 MM HG: CPT | Performed by: INTERNAL MEDICINE

## 2024-05-30 PROCEDURE — 99495 TRANSJ CARE MGMT MOD F2F 14D: CPT | Performed by: INTERNAL MEDICINE

## 2024-05-30 PROCEDURE — 3008F BODY MASS INDEX DOCD: CPT | Performed by: INTERNAL MEDICINE

## 2024-05-30 PROCEDURE — 3044F HG A1C LEVEL LT 7.0%: CPT | Performed by: INTERNAL MEDICINE

## 2024-05-30 PROCEDURE — 3074F SYST BP LT 130 MM HG: CPT | Performed by: INTERNAL MEDICINE

## 2024-05-30 RX ORDER — CLONIDINE HYDROCHLORIDE 0.1 MG/1
0.1 TABLET ORAL 2 TIMES DAILY
COMMUNITY
Start: 2024-02-03

## 2024-05-30 NOTE — PROGRESS NOTES
Multiple attempts to reach pt and messages left with no return call.  Patient went in for HFU appt with PCP on 5/30/24.  Encounter closing.

## 2024-05-31 NOTE — PROGRESS NOTES
Subjective:   Gorge Alejandre is a 58 year old male who presents for hospital follow up.   He was discharged from [facility] to Home or Self Care  Admission Date: 5/15/24   Discharge Date: 5/22/24  Hospital Discharge Diagnosis: Peritonitis assoc with peritoneal dialysis, ESRD, controlled DM 2 on dm dialysis, hypetension, hyperlipidemia    Interactive contact within 2 business days post discharge first initiated on Date: 5/23/2024    During the visit, the following was completed:  Obtained and reviewed discharge summary, continuity of care documents, Hospitalist notes, and Renal notes  Reviewed Labs (CBC, CMP) and Labs (Microbiology)    HPI: pt admitted at [facility] with peritonitis assoc with PD, was seen by ID, started on IV vanco; pt did well and switched to IV cephalaxin as out pt for 3 weeks per ID. His PD cath was removed and pt transitioned to hemodialysis .    History/Other:   Current Medications:  Medication Reconciliation:  I am aware of an inpatient discharge within the last 30 days.  The discharge medication list has been reconciled with the patient's current medication list and reviewed by me.  See medication list for additions of new medication, and changes to current doses of medications and discontinued medications.  Outpatient Medications Marked as Taking for the 5/30/24 encounter (Office Visit) with Jatinder Hein MD   Medication Sig    cloNIDine 0.1 MG Oral Tab Take 1 tablet (0.1 mg total) by mouth 2 (two) times daily.    acetaminophen 500 MG Oral Tab Take 1 tablet (500 mg total) by mouth every 6 (six) hours as needed for Fever or Pain (equal to or greater than 100.4).    sevelamer carbonate 800 MG Oral Tab Take 3 tablets (2,400 mg total) by mouth 3 (three) times daily with meals.    metoprolol tartrate 100 MG Oral Tab Take 1 tablet (100 mg total) by mouth daily.    metoprolol tartrate 25 MG Oral Tab Take 1 tablet (25 mg total) by mouth daily.    VITAMIN D, CHOLECALCIFEROL, OR Take 1 capsule by  mouth twice a week.    amLODIPine 10 MG Oral Tab Take 1 tablet (10 mg total) by mouth daily. (Patient taking differently: Take 1 tablet (10 mg total) by mouth every morning.)    atorvastatin 80 MG Oral Tab Take 1 tablet (80 mg total) by mouth nightly.       Review of Systems:  GENERAL: weight stable, energy stable, no sweating  SKIN: denies any unusual skin lesions  EYES: denies blurred vision or double vision  HEENT: denies nasal congestion, sinus pain or ST  LUNGS: denies shortness of breath with exertion  CARDIOVASCULAR: denies chest pain on exertion or palpitations  GI: denies abdominal pain, denies heartburn, denies diarrhea  MUSCULOSKELETAL: denies pain, normal range of motion of extremities  NEURO: denies headaches, denies dizziness, denies weakness  PSYCHE: denies depression or anxiety  HEMATOLOGIC: denies hx of anemia, or bruising, denies bleeding  ENDOCRINE: denies thyroid history  ALL/ASTHMA: denies hx of allergy or asthma    Objective:   No LMP for male patient.  Estimated body mass index is 28.54 kg/m² as calculated from the following:    Height as of this encounter: 5' 8\" (1.727 m).    Weight as of this encounter: 187 lb 11.2 oz (85.1 kg).   /70   Pulse 73   Temp 97.5 °F (36.4 °C) (Tympanic)   Ht 5' 8\" (1.727 m)   Wt 187 lb 11.2 oz (85.1 kg)   SpO2 100%   BMI 28.54 kg/m²    GENERAL: well developed, well nourished, in no apparent distress  SKIN: no rashes, no suspicious lesions  HEENT: atraumatic, normocephalic, ears and throat are clear  EYES: PERRLA, EOMI, conjunctiva are clear  NECK: supple, no adenopathy, no bruits  CHEST: no chest tenderness  LUNGS: clear to auscultation  CARDIO: RRR without murmur  GI: good BS's, no masses, HSM or tenderness  MUSCULOSKELETAL: back is not tender, FROM of the extremities  EXTREMITIES: no cyanosis, clubbing or edema; monofilament test normal both feet   NEURO: Oriented times three, cranial nerves are intact, motor and sensory are grossly  intact    Assessment & Plan:   (T85.71XD) Peritonitis associated with peritoneal dialysis, subsequent encounter ([facility])  (primary encounter diagnosis)  Plan: clinically improved with no abd pain. He will cotinue with IV ancef for tnext 3 days;      (N18.6,  Z99.2) ESRD (end stage renal disease) on dialysis ([facility])  Plan: pt now switched to  hemodialysis managed by Dr Mcbride    (E11.22,  N18.6,  Z99.2) Controlled type 2 diabetes mellitus with chronic kidney disease on chronic dialysis, without long-term current use of insulin ([facility])  Plan: his A1c done while in [facility] wa 6.5 so dm remains controlled with diet alone.     (E11.2763) Non-proliferative diabetic retinopathy, both eyes ([facility])  Plan: Ophthalmology Referral - In Network        Pt referred to retinal specialist.     (I10) Essential hypertension  Plan: bp controlled; cpm.     (E78.2) Mixed hyperlipidemia  Plan: continue with atorvastatin          No follow-ups on file.

## 2024-06-03 RX ORDER — AMLODIPINE BESYLATE 10 MG/1
10 TABLET ORAL EVERY MORNING
Qty: 90 TABLET | Refills: 3 | Status: SHIPPED | OUTPATIENT
Start: 2024-06-03

## 2024-08-13 ENCOUNTER — EKG ENCOUNTER (OUTPATIENT)
Dept: LAB | Age: 59
End: 2024-08-13
Attending: INTERNAL MEDICINE
Payer: COMMERCIAL

## 2024-08-13 ENCOUNTER — OFFICE VISIT (OUTPATIENT)
Dept: INTERNAL MEDICINE CLINIC | Facility: CLINIC | Age: 59
End: 2024-08-13
Payer: COMMERCIAL

## 2024-08-13 VITALS
TEMPERATURE: 97 F | BODY MASS INDEX: 28.62 KG/M2 | HEART RATE: 70 BPM | DIASTOLIC BLOOD PRESSURE: 78 MMHG | SYSTOLIC BLOOD PRESSURE: 120 MMHG | OXYGEN SATURATION: 99 % | WEIGHT: 188.88 LBS | HEIGHT: 68 IN

## 2024-08-13 DIAGNOSIS — N18.6 ESRD (END STAGE RENAL DISEASE) ON DIALYSIS (HCC): ICD-10-CM

## 2024-08-13 DIAGNOSIS — Z00.00 ANNUAL PHYSICAL EXAM: Primary | ICD-10-CM

## 2024-08-13 DIAGNOSIS — Z99.2 ESRD (END STAGE RENAL DISEASE) ON DIALYSIS (HCC): ICD-10-CM

## 2024-08-13 DIAGNOSIS — Z12.11 COLON CANCER SCREENING: ICD-10-CM

## 2024-08-13 DIAGNOSIS — Z01.818 PREOP GENERAL PHYSICAL EXAM: ICD-10-CM

## 2024-08-13 DIAGNOSIS — E78.2 MIXED HYPERLIPIDEMIA: ICD-10-CM

## 2024-08-13 DIAGNOSIS — I10 ESSENTIAL HYPERTENSION: ICD-10-CM

## 2024-08-13 DIAGNOSIS — E11.22 CONTROLLED TYPE 2 DIABETES MELLITUS WITH CHRONIC KIDNEY DISEASE ON CHRONIC DIALYSIS, WITHOUT LONG-TERM CURRENT USE OF INSULIN (HCC): ICD-10-CM

## 2024-08-13 DIAGNOSIS — Z99.2 CONTROLLED TYPE 2 DIABETES MELLITUS WITH CHRONIC KIDNEY DISEASE ON CHRONIC DIALYSIS, WITHOUT LONG-TERM CURRENT USE OF INSULIN (HCC): ICD-10-CM

## 2024-08-13 DIAGNOSIS — E11.3493 SEVERE NONPROLIFERATIVE DIABETIC RETINOPATHY OF BOTH EYES ASSOCIATED WITH TYPE 2 DIABETES MELLITUS, MACULAR EDEMA PRESENCE UNSPECIFIED (HCC): ICD-10-CM

## 2024-08-13 DIAGNOSIS — Z12.5 PROSTATE CANCER SCREENING: ICD-10-CM

## 2024-08-13 DIAGNOSIS — N18.6 CONTROLLED TYPE 2 DIABETES MELLITUS WITH CHRONIC KIDNEY DISEASE ON CHRONIC DIALYSIS, WITHOUT LONG-TERM CURRENT USE OF INSULIN (HCC): ICD-10-CM

## 2024-08-13 LAB
ATRIAL RATE: 66 BPM
P AXIS: 46 DEGREES
P-R INTERVAL: 204 MS
Q-T INTERVAL: 416 MS
QRS DURATION: 80 MS
QTC CALCULATION (BEZET): 436 MS
R AXIS: -21 DEGREES
T AXIS: 0 DEGREES
VENTRICULAR RATE: 66 BPM

## 2024-08-13 PROCEDURE — 93005 ELECTROCARDIOGRAM TRACING: CPT

## 2024-08-13 PROCEDURE — 93010 ELECTROCARDIOGRAM REPORT: CPT | Performed by: INTERNAL MEDICINE

## 2024-08-13 RX ORDER — ATORVASTATIN CALCIUM 80 MG/1
80 TABLET, FILM COATED ORAL NIGHTLY
Qty: 90 TABLET | Refills: 1 | Status: SHIPPED | OUTPATIENT
Start: 2024-08-13

## 2024-08-13 NOTE — PROGRESS NOTES
Subjective:     Patient ID: Gorge Alejandre is a 58 year old male.    Patient presents today for her annual physical.  He also states he needs medical clearance as requested by his surgeon for placement of brachial cephalic AV fistula to be used in the future for his hemodialysis. He currently has perm catheter where he gets dialyzed.   Patient has no history of CAD, CHF,CVA or insulin use for his DM 2, he has ESRD currently on hemodialysis.        History/Other:   Review of Systems   Constitutional: Negative.  Negative for appetite change, fever and unexpected weight change.   HENT: Negative.     Eyes: Negative.    Respiratory: Negative.  Negative for cough, shortness of breath and wheezing.         No wheezing   Cardiovascular:  Negative for chest pain, palpitations and leg swelling.        Does treadmill  at least 2/ week for 30 min;   No pnd/orthopnea  Able flight of stairs at home without chest pains   Gastrointestinal:  Negative for anal bleeding, blood in stool and vomiting.   Genitourinary: Negative.    Neurological:  Negative for syncope.   Hematological: Negative.      Current Outpatient Medications   Medication Sig Dispense Refill    amLODIPine 10 MG Oral Tab Take 1 tablet (10 mg total) by mouth every morning. 90 tablet 3    cloNIDine 0.1 MG Oral Tab Take 1 tablet (0.1 mg total) by mouth 2 (two) times daily.      sevelamer carbonate 800 MG Oral Tab Take 3 tablets (2,400 mg total) by mouth 3 (three) times daily with meals.      metoprolol tartrate 100 MG Oral Tab Take 1 tablet (100 mg total) by mouth daily.      metoprolol tartrate 25 MG Oral Tab Take 1 tablet (25 mg total) by mouth daily.      VITAMIN D, CHOLECALCIFEROL, OR Take 1 capsule by mouth twice a week.      atorvastatin 80 MG Oral Tab Take 1 tablet (80 mg total) by mouth nightly. 90 tablet 1    acetaminophen 500 MG Oral Tab Take 1 tablet (500 mg total) by mouth every 6 (six) hours as needed for Fever or Pain (equal to or greater than 100.4).       benzocaine 10 % Mouth/Throat Gel Use as directed 1 Application in the mouth or throat every 6 (six) hours as needed for Pain (for toothache/gum ache). (Patient not taking: Reported on 5/30/2024) 1 each 0    benzonatate 100 MG Oral Cap Take 1 capsule (100 mg total) by mouth 3 (three) times daily as needed for cough. (Patient not taking: Reported on 5/30/2024) 12 capsule 0    guaiFENesin 100 MG/5 ML Oral Take 10 mL (200 mg total) by mouth every 6 (six) hours as needed. (Patient not taking: Reported on 5/30/2024) 200 mL 0    ceFAZolin in sodium chloride 0.9% 2 g/100mL Intravenous Solution Inject 100 mL (2 g total) into the vein See Admin Instructions. Give post-HD on HD days until 6/10 (Patient not taking: Reported on 5/30/2024) 1 each 0     Allergies:  Allergies   Allergen Reactions    Losartan Coughing     Tolerated in 2023       Past Medical History:    Blood disorder    Anemia    Colon polyp    repeat CLN in 5 years    Diabetes (HCC)    Diet control. Pt off from medications for 4 months now.    Diabetes mellitus (HCC)    Dialysis patient (HCC)    Pt with temporary cath on R chest with HD started 8/2023 with HD q MWF.    Essential hypertension    High blood pressure    High cholesterol    History of blood transfusion    No blood transfusion reaction    Hyperlipidemia    Renal disorder    Strabismus    OD XT    Thrombus    Per pt had (+) clot on R IJ (temp HD cath)      Past Surgical History:   Procedure Laterality Date    Cataract      Cataract extraction w/  intraocular lens implant Bilateral 2017    Dr. OZZY Coleman    Colonoscopy N/A 3/2/2022    Procedure: COLONOSCOPY;  Surgeon: ALLAN Gaffney MD;  Location: [facility] ENDOSCOPY    Other surgical history      strabismus surgery    Strabismus surgery Bilateral 1995    Vasectomy        Family History   Problem Relation Age of Onset    No Known Problems Father     Hypertension Mother     Diabetes Mother     Diabetes Brother     Hypertension Brother     Glaucoma Neg        Social History:   Social History     Socioeconomic History    Marital status: Single   Tobacco Use    Smoking status: Never     Passive exposure: Never    Smokeless tobacco: Never   Vaping Use    Vaping status: Never Used   Substance and Sexual Activity    Alcohol use: Yes     Comment: social use only.    Drug use: No   Other Topics Concern    Pt has a pacemaker No    Pt has a defibrillator No    Reaction to local anesthetic No     Social Determinants of Health     Financial Resource Strain: Low Risk  (8/8/2023)    Financial Resource Strain     Difficulty of Paying Living Expenses: Not very hard     Med Affordability: No   Food Insecurity: No Food Insecurity (5/15/2024)    Food Insecurity     Food Insecurity: Never true   Transportation Needs: No Transportation Needs (5/15/2024)    Transportation Needs     Lack of Transportation: No   Housing Stability: Low Risk  (5/15/2024)    Housing Stability     Housing Instability: No        Objective:   Physical Exam  Constitutional:       General: He is not in acute distress.     Appearance: He is well-developed. He is not ill-appearing, toxic-appearing or diaphoretic.   HENT:      Head: Normocephalic and atraumatic.      Right Ear: Tympanic membrane, ear canal and external ear normal.      Left Ear: Tympanic membrane, ear canal and external ear normal.      Nose: Nose normal.      Mouth/Throat:      Pharynx: No oropharyngeal exudate.   Eyes:      General: No scleral icterus.        Right eye: No discharge.         Left eye: No discharge.      Conjunctiva/sclera: Conjunctivae normal.      Pupils: Pupils are equal, round, and reactive to light.   Neck:      Thyroid: No thyromegaly.      Vascular: No carotid bruit or JVD.   Cardiovascular:      Rate and Rhythm: Normal rate and regular rhythm.      Pulses:           Dorsalis pedis pulses are 3+ on the right side and 3+ on the left side.        Posterior tibial pulses are 3+ on the right side and 3+ on the left side.      Heart  sounds: Normal heart sounds. No murmur heard.     No friction rub. No gallop.   Pulmonary:      Effort: Pulmonary effort is normal. No respiratory distress.      Breath sounds: Normal breath sounds. No wheezing or rales.   Abdominal:      General: Abdomen is flat. Bowel sounds are normal. There is no distension.      Palpations: Abdomen is soft. There is no mass.      Tenderness: There is no abdominal tenderness. There is no guarding or rebound.   Genitourinary:     Prostate: Normal.      Rectum: Normal.   Musculoskeletal:         General: Normal range of motion.      Cervical back: Normal range of motion and neck supple. No rigidity or tenderness.      Right lower leg: No edema.      Left lower leg: No edema.      Right foot: No deformity.      Left foot: No deformity.   Feet:      Right foot:      Protective Sensation: 10 sites tested.  10 sites sensed.      Skin integrity: Skin integrity normal.      Toenail Condition: Right toenails are normal.      Left foot:      Protective Sensation: 10 sites tested.  10 sites sensed.      Skin integrity: Skin integrity normal.      Toenail Condition: Left toenails are normal.   Lymphadenopathy:      Cervical: No cervical adenopathy.   Skin:     General: Skin is warm and dry.      Coloration: Skin is not jaundiced or pale.      Findings: No rash.   Neurological:      Mental Status: He is alert and oriented to person, place, and time.   Psychiatric:         Mood and Affect: Mood normal.       Assessment & Plan:   (Z00.00) Annual physical exam  (primary encounter diagnosis)  Plan: pt advised to get recommended vaccines;  pt gets his labs thru dialysis center and states scheduled to have one this week and will give as copy o f labs.     (Z01.285) Preop general physical exam  Plan: EKG 12 Lead to be performed at [facility]        Preop EKG done and was normal. Pt will be getting preop labs done.   Pt's RCRI of 1 however pt has good functional capacity able  to do >4 METS activities without getting chest pains.  His AVF placement surgery also considered to be low risk surgery.  Pt is medically optimized and may proceed with his surgery.     (N18.6,  Z99.2) ESRD (end stage renal disease) on dialysis (HCC)  Plan: pt now on hemodialysis, switched from peritoneal dialysis few months ago when he had bacterial peritonitis.  Planning for AV fistula placement.  Pt also being evaluated at [facility] for renal transplant.     (E11.22,  N18.6,  Z99.2) Controlled type 2 diabetes mellitus with chronic kidney disease on chronic dialysis, without long-term current use of insulin (HCC)  Plan: recent A1c done 2mos ago was 6.9 and controlled with diet alone.     (I10) Essential hypertension  Plan: bp controlled. Cpm.     (E78.2) Mixed hyperlipidemia  Plan: Lipid Panel        Check lipid panel; continue with statin.     (E11.3493) Severe nonproliferative diabetic retinopathy of both eyes associated with type 2 diabetes mellitus, macular edema presence unspecified ([facility])  Plan: pt does ffp regularly with his optha/retinal specialist.     (Z12.5) Prostate cancer screening  Plan: PSA, TOTAL [5363] [Q]        Check psa.     (Z12.11) Colon cancer screening  Plan: pt current with his colonoscopy.               No orders of the defined types were placed in this encounter.      Meds This Visit:  Requested Prescriptions      No prescriptions requested or ordered in this encounter       Imaging & Referrals:  None

## 2024-08-16 LAB
CHOL/HDLC RATIO: 3.1 (CALC)
CHOLESTEROL, TOTAL: 184 MG/DL
HDL CHOLESTEROL: 59 MG/DL
LDL-CHOLESTEROL: 106 MG/DL (CALC)
NON-HDL CHOLESTEROL: 125 MG/DL (CALC)
PSA, TOTAL: 0.64 NG/ML
TRIGLYCERIDES: 96 MG/DL

## 2024-08-20 ENCOUNTER — TELEPHONE (OUTPATIENT)
Dept: INTERNAL MEDICINE CLINIC | Facility: CLINIC | Age: 59
End: 2024-08-20

## 2024-08-20 NOTE — TELEPHONE ENCOUNTER
L/M for pt to return phonecall regarding labs CBC, BMP for his clearance, if he could please send us a copy of results.

## 2024-08-22 NOTE — TELEPHONE ENCOUNTER
Spoke with patient, verified , patient states he had labs completed at [facility] about 1 week ago. Called [facility] at [phone], spoke with Estefania, patient did not have CBC or CMP done at [facility], only results available are PSA and lipid panel that was ordered by Dr Hein on 8/15/24.    Spoke with patient and informed that Dr Hein needs the CBC and CMP that patient has drawn weekly in dialysis and that the [facility] results he completed were for lipid panel and prostate, patient verbalized understanding and will give the office a call to obtain results or he will obtain results on Saturday when he goes back to dialysis.

## 2024-09-23 RX ORDER — METOPROLOL TARTRATE 100 MG
100 TABLET ORAL DAILY
Qty: 90 TABLET | Refills: 1 | Status: SHIPPED | OUTPATIENT
Start: 2024-09-23

## 2024-09-23 RX ORDER — METOPROLOL TARTRATE 25 MG/1
25 TABLET, FILM COATED ORAL DAILY
Qty: 90 TABLET | Refills: 1 | Status: SHIPPED | OUTPATIENT
Start: 2024-09-23

## 2024-09-24 ENCOUNTER — MED REC SCAN ONLY (OUTPATIENT)
Dept: NEPHROLOGY | Facility: CLINIC | Age: 59
End: 2024-09-24

## 2024-12-05 ENCOUNTER — TELEPHONE (OUTPATIENT)
Dept: NEPHROLOGY | Facility: CLINIC | Age: 59
End: 2024-12-05

## 2025-01-28 ENCOUNTER — OFFICE VISIT (OUTPATIENT)
Dept: SURGERY | Facility: CLINIC | Age: 60
End: 2025-01-28

## 2025-01-28 ENCOUNTER — TELEPHONE (OUTPATIENT)
Dept: SURGERY | Facility: CLINIC | Age: 60
End: 2025-01-28

## 2025-01-28 DIAGNOSIS — N52.8 OTHER MALE ERECTILE DYSFUNCTION: ICD-10-CM

## 2025-01-28 DIAGNOSIS — R31.29 MICROHEMATURIA: Primary | ICD-10-CM

## 2025-01-28 PROCEDURE — 99213 OFFICE O/P EST LOW 20 MIN: CPT | Performed by: UROLOGY

## 2025-01-28 RX ORDER — FUROSEMIDE 40 MG/1
40 TABLET ORAL DAILY
COMMUNITY
Start: 2025-01-01

## 2025-01-28 NOTE — PROGRESS NOTES
Gorge Alejandre is a 59 year old male.    HPI:     Chief Complaint   Patient presents with    Erectile Dysfuntion     Lov 9/2023 pt was using Tri-Mix       59-year-old male in follow-up to a previous visit September 18, 2023.  Has a history of erectile dysfunction previously responsive to Trimix injections self-administered, noted to have microhematuria at his last visit with us September 2023.  Plan had been for abdominal pelvic imaging and cystoscopy but he was lost to follow-up.  Has a history of end-stage renal disease and remains on hemodialysis.  Has a history of diabetes, hypertension, hypercholesterolemia.  Denies any urinating problems.  He states he voids usually only once in the early morning and produces little urine.  Denies any gross hematuria or dysuria.      HISTORY:  Past Medical History:    Blood disorder    Anemia    Colon polyp    repeat CLN in 5 years    Diabetes (HCC)    Diet control. Pt off from medications for 4 months now.    Diabetes mellitus (HCC)    Dialysis patient    Pt with temporary cath on R chest with HD started 8/2023 with HD q MWF.    Essential hypertension    High blood pressure    High cholesterol    History of blood transfusion    No blood transfusion reaction    Hyperlipidemia    Renal disorder    Strabismus    OD XT    Thrombus    Per pt had (+) clot on R IJ (temp HD cath)      Past Surgical History:   Procedure Laterality Date    Cataract      Cataract extraction w/  intraocular lens implant Bilateral 2017    Dr. OZZY Coleman    Colonoscopy N/A 3/2/2022    Procedure: COLONOSCOPY;  Surgeon: ALLAN Gaffney MD;  Location: [facility] ENDOSCOPY    Other surgical history      strabismus surgery    Strabismus surgery Bilateral 1995    Vasectomy        Family History   Problem Relation Age of Onset    No Known Problems Father     Hypertension Mother     Diabetes Mother     Cancer Sister         esophageal cnacer    Diabetes Brother     Hypertension Brother     Glaucoma Neg       Social  History:   Social History     Socioeconomic History    Marital status: Single   Tobacco Use    Smoking status: Never     Passive exposure: Never    Smokeless tobacco: Never   Vaping Use    Vaping status: Never Used   Substance and Sexual Activity    Alcohol use: Yes     Comment: social use only.    Drug use: No   Other Topics Concern    Pt has a pacemaker No    Pt has a defibrillator No    Reaction to local anesthetic No     Social Drivers of Health     Financial Resource Strain: Low Risk  (8/8/2023)    Financial Resource Strain     Difficulty of Paying Living Expenses: Not very hard     Med Affordability: No   Food Insecurity: No Food Insecurity (5/15/2024)    Food Insecurity     Food Insecurity: Never true   Transportation Needs: No Transportation Needs (5/15/2024)    Transportation Needs     Lack of Transportation: No   Housing Stability: Low Risk  (5/15/2024)    Housing Stability     Housing Instability: No        Medications (Active prior to today's visit):  Current Outpatient Medications   Medication Sig Dispense Refill    furosemide 40 MG Oral Tab Take 1 tablet (40 mg total) by mouth daily.      METOPROLOL TARTRATE 25 MG Oral Tab TAKE ONE TABLET BY MOUTH DAILY 90 tablet 1    METOPROLOL TARTRATE 100 MG Oral Tab TAKE ONE TABLET BY MOUTH DAILY 90 tablet 1    atorvastatin 80 MG Oral Tab Take 1 tablet (80 mg total) by mouth nightly. 90 tablet 1    amLODIPine 10 MG Oral Tab Take 1 tablet (10 mg total) by mouth every morning. 90 tablet 3    cloNIDine 0.1 MG Oral Tab Take 1 tablet (0.1 mg total) by mouth 2 (two) times daily.      acetaminophen 500 MG Oral Tab Take 1 tablet (500 mg total) by mouth every 6 (six) hours as needed for Fever or Pain (equal to or greater than 100.4).      benzocaine 10 % Mouth/Throat Gel Use as directed 1 Application in the mouth or throat every 6 (six) hours as needed for Pain (for toothache/gum ache). 1 each 0    ceFAZolin in sodium chloride 0.9% 2 g/100mL Intravenous Solution Inject 100  mL (2 g total) into the vein See Admin Instructions. Give post-HD on HD days until 6/10 1 each 0    sevelamer carbonate 800 MG Oral Tab Take 3 tablets (2,400 mg total) by mouth 3 (three) times daily with meals.      VITAMIN D, CHOLECALCIFEROL, OR Take 1 capsule by mouth twice a week.         Allergies:  Allergies[1]      ROS:       PHYSICAL EXAM:        ASSESSMENT/PLAN:   Assessment   Encounter Diagnoses   Name Primary?    Microhematuria Yes    Other male erectile dysfunction        Recommend:  - Microhematuria: Office cystoscopy, noncontrast CT scan of the abdomen and pelvis.  - Erectile dysfunction: My office will determine his prescription from [place] [facility] and he will be provided a refill as per his request.         Orders This Visit:  No orders of the defined types were placed in this encounter.      Meds This Visit:  Requested Prescriptions      No prescriptions requested or ordered in this encounter       Imaging & Referrals:  CT ABDOMEN+PELVIS KIDNEYSTONE 2D RNDR(NO IV,NO ORAL)(CPT=74176)     1/28/2025  Renetta Sung MD               [1]   Allergies  Allergen Reactions    Losartan Coughing     Tolerated in 2023

## 2025-01-28 NOTE — TELEPHONE ENCOUNTER
Staff,  Please contact [facility] and find out what the patient's last prescription for Trimix was.  He thinks he was not on the standard solution.  He needs a refill.  Once we determine, I can fill out the prescription refill for him.

## 2025-03-04 ENCOUNTER — TELEPHONE (OUTPATIENT)
Dept: SURGERY | Facility: CLINIC | Age: 60
End: 2025-03-04

## 2025-03-04 NOTE — TELEPHONE ENCOUNTER
Late cancellation  2025 Urology Office Visit (Procedure )  Dr. Ana Lilia PHELPS created  Letter Sent  03/04/25  Canceled via [employer] (Will be at dialysis. Same time needs to re schedule

## 2025-03-11 ENCOUNTER — TELEPHONE (OUTPATIENT)
Dept: SURGERY | Facility: CLINIC | Age: 60
End: 2025-03-11

## 2025-03-20 ENCOUNTER — OFFICE VISIT (OUTPATIENT)
Dept: INTERNAL MEDICINE CLINIC | Facility: CLINIC | Age: 60
End: 2025-03-20

## 2025-03-20 VITALS
BODY MASS INDEX: 29 KG/M2 | SYSTOLIC BLOOD PRESSURE: 134 MMHG | DIASTOLIC BLOOD PRESSURE: 80 MMHG | HEART RATE: 71 BPM | WEIGHT: 191.38 LBS

## 2025-03-20 DIAGNOSIS — I10 ESSENTIAL HYPERTENSION: ICD-10-CM

## 2025-03-20 DIAGNOSIS — Z99.2 ESRD (END STAGE RENAL DISEASE) ON DIALYSIS (HCC): ICD-10-CM

## 2025-03-20 DIAGNOSIS — Z99.2 CONTROLLED TYPE 2 DIABETES MELLITUS WITH CHRONIC KIDNEY DISEASE ON CHRONIC DIALYSIS, WITHOUT LONG-TERM CURRENT USE OF INSULIN (HCC): Primary | ICD-10-CM

## 2025-03-20 DIAGNOSIS — D12.6 ADENOMATOUS POLYP OF COLON, UNSPECIFIED PART OF COLON: ICD-10-CM

## 2025-03-20 DIAGNOSIS — E11.22 CONTROLLED TYPE 2 DIABETES MELLITUS WITH CHRONIC KIDNEY DISEASE ON CHRONIC DIALYSIS, WITHOUT LONG-TERM CURRENT USE OF INSULIN (HCC): Primary | ICD-10-CM

## 2025-03-20 DIAGNOSIS — E78.2 MIXED HYPERLIPIDEMIA: ICD-10-CM

## 2025-03-20 DIAGNOSIS — N18.6 CONTROLLED TYPE 2 DIABETES MELLITUS WITH CHRONIC KIDNEY DISEASE ON CHRONIC DIALYSIS, WITHOUT LONG-TERM CURRENT USE OF INSULIN (HCC): Primary | ICD-10-CM

## 2025-03-20 DIAGNOSIS — N18.6 ESRD (END STAGE RENAL DISEASE) ON DIALYSIS (HCC): ICD-10-CM

## 2025-03-20 DIAGNOSIS — M25.511 ACUTE PAIN OF RIGHT SHOULDER: ICD-10-CM

## 2025-03-20 LAB — HEMOGLOBIN A1C: 5.2 % (ref 4.3–5.6)

## 2025-03-20 PROCEDURE — 83036 HEMOGLOBIN GLYCOSYLATED A1C: CPT | Performed by: INTERNAL MEDICINE

## 2025-03-20 PROCEDURE — 3044F HG A1C LEVEL LT 7.0%: CPT | Performed by: INTERNAL MEDICINE

## 2025-03-20 PROCEDURE — 99213 OFFICE O/P EST LOW 20 MIN: CPT | Performed by: INTERNAL MEDICINE

## 2025-03-20 PROCEDURE — 3079F DIAST BP 80-89 MM HG: CPT | Performed by: INTERNAL MEDICINE

## 2025-03-20 PROCEDURE — 3075F SYST BP GE 130 - 139MM HG: CPT | Performed by: INTERNAL MEDICINE

## 2025-03-20 RX ORDER — SUCROFERRIC OXYHYDROXIDE 500 MG/1
3 TABLET, CHEWABLE ORAL DAILY
COMMUNITY

## 2025-03-20 NOTE — PROGRESS NOTES
Subjective:     Patient ID: Gorge Alejandre is a 59 year old male.    Shoulder pain right for 2 to 3 weeks;  worse with movement; no trauma noted;  no meds taken.     Shoulder Pain   This is a new problem. The current episode started 1 to 4 weeks ago (2 to 3 weeks). There has been no history of extremity trauma. The problem occurs intermittently. The problem has been waxing and waning. The quality of the pain is described as aching. The pain is at a severity of 3/10. The pain is mild. Associated symptoms include a limited range of motion and stiffness. Pertinent negatives include no fever or joint swelling. Exacerbated by: rom of the right shoulder. He has tried oral narcotics for the symptoms. The treatment provided significant relief. There is no history of diabetes.   Hypertension  This is a chronic problem. The current episode started more than 1 year ago. The problem has been gradually improving since onset. The problem is controlled. Agents associated with hypertension include amphetamines. Risk factors for coronary artery disease include dyslipidemia and diabetes mellitus. Past treatments include central alpha agonists, beta blockers and calcium channel blockers. The current treatment provides significant improvement. There are no compliance problems.  Hypertensive end-organ damage includes kidney disease. There is no history of angina, CVA, heart failure or retinopathy. Identifiable causes of hypertension include chronic renal disease. There is no history of a hypertension causing med.       History/Other:   Review of Systems   Constitutional: Negative.  Negative for fever.   Respiratory: Negative.     Cardiovascular: Negative.    Gastrointestinal: Negative.    Genitourinary: Negative.    Musculoskeletal:  Positive for stiffness.     Current Outpatient Medications   Medication Sig Dispense Refill    VELPHORO 500 MG Oral Chew Tab Chew 3 tablets by mouth daily.      furosemide 40 MG Oral Tab Take 1 tablet (40 mg  total) by mouth daily.      METOPROLOL TARTRATE 25 MG Oral Tab TAKE ONE TABLET BY MOUTH DAILY 90 tablet 1    METOPROLOL TARTRATE 100 MG Oral Tab TAKE ONE TABLET BY MOUTH DAILY 90 tablet 1    atorvastatin 80 MG Oral Tab Take 1 tablet (80 mg total) by mouth nightly. 90 tablet 1    amLODIPine 10 MG Oral Tab Take 1 tablet (10 mg total) by mouth every morning. 90 tablet 3    cloNIDine 0.1 MG Oral Tab Take 1 tablet (0.1 mg total) by mouth 2 (two) times daily.      acetaminophen 500 MG Oral Tab Take 1 tablet (500 mg total) by mouth every 6 (six) hours as needed for Fever or Pain (equal to or greater than 100.4).      VITAMIN D, CHOLECALCIFEROL, OR Take 1 capsule by mouth twice a week.       Allergies:Allergies[1]    Past Medical History:    Blood disorder    Anemia    Colon polyp    repeat CLN in 5 years    Diabetes (HCC)    Diet control. Pt off from medications for 4 months now.    Diabetes mellitus (HCC)    Dialysis patient    Pt with temporary cath on R chest with HD started 8/2023 with HD q MWF.    Essential hypertension    High blood pressure    High cholesterol    History of blood transfusion    No blood transfusion reaction    Hyperlipidemia    Renal disorder    Strabismus    OD XT    Thrombus    Per pt had (+) clot on R IJ (temp HD cath)      Past Surgical History:   Procedure Laterality Date    Cataract      Cataract extraction w/  intraocular lens implant Bilateral 2017    Dr. OZZY Coleman    Colonoscopy N/A 03/02/2022    Procedure: COLONOSCOPY;  Surgeon: ALLAN Gaffney MD;  Location: [facility] ENDOSCOPY    Other surgical history      strabismus surgery    Strabismus surgery Bilateral 1995    Vasectomy        Family History   Problem Relation Age of Onset    No Known Problems Father     Hypertension Mother     Diabetes Mother     Cancer Sister         esophageal cnacer    Diabetes Brother     Hypertension Brother     Glaucoma Neg       Social History:   Social History     Socioeconomic History    Marital status: Single    Tobacco Use    Smoking status: Never     Passive exposure: Never    Smokeless tobacco: Never   Vaping Use    Vaping status: Never Used   Substance and Sexual Activity    Alcohol use: Yes     Comment: social use only.    Drug use: No   Other Topics Concern    Pt has a pacemaker No    Pt has a defibrillator No    Reaction to local anesthetic No     Social Drivers of Health     Food Insecurity: No Food Insecurity (5/15/2024)    Food Insecurity     Food Insecurity: Never true   Transportation Needs: No Transportation Needs (5/15/2024)    Transportation Needs     Lack of Transportation: No   Housing Stability: Low Risk  (5/15/2024)    Housing Stability     Housing Instability: No        Objective:   Physical Exam  Constitutional:       General: He is not in acute distress.     Appearance: He is not ill-appearing, toxic-appearing or diaphoretic.   HENT:      Right Ear: External ear normal.      Left Ear: External ear normal.   Eyes:      General: No scleral icterus.        Right eye: No discharge.         Left eye: No discharge.      Conjunctiva/sclera: Conjunctivae normal.   Neck:      Vascular: No carotid bruit.   Cardiovascular:      Rate and Rhythm: Normal rate and regular rhythm.      Heart sounds: Normal heart sounds. No murmur heard.  Pulmonary:      Effort: Pulmonary effort is normal. No respiratory distress.      Breath sounds: Normal breath sounds. No wheezing or rales.   Abdominal:      General: Bowel sounds are normal. There is no distension.      Palpations: Abdomen is soft. There is no mass.      Tenderness: There is no abdominal tenderness. There is no guarding or rebound.   Musculoskeletal:      Right shoulder: Decreased range of motion.      Cervical back: Normal range of motion and neck supple. No rigidity or tenderness.      Right lower leg: No edema.      Left lower leg: No edema.   Lymphadenopathy:      Cervical: No cervical adenopathy.   Skin:     Coloration: Skin is not jaundiced or pale.    Neurological:      Mental Status: He is alert.         Assessment & Plan:   (E11.22,  N18.6,  Z99.2) Controlled type 2 diabetes mellitus with chronic kidney disease on chronic dialysis, without long-term current use of insulin (HCC)  (primary encounter diagnosis)  Plan: POC Glycohemoglobin [49263]        A1c is now normal at 5.2 so he is not diabetic anymore     (I10) Essential hypertension  Plan: bp controlled currentl with bp meds.     (E78.2) Mixed hyperlipidemia  Plan: controlled with chol statin med.     (N18.6,  Z99.2) ESRD (end stage renal disease) on dialysis (HCC)  Plan: pt on HD.    (D12.6) Adenomatous polyp of colon, unspecified part of colon  Plan: Gastro Referral - In Network        Pt had colonoscopy in 2022 but had adenomatous polyp; pt currenty being evaluated for renal transplant and was told needs colnonscop pt referred back to   His gi.     (M25.511) Acute pain of right shoulder  Plan: XR SHOULDER, COMPLETE (MIN 2 VIEWS), RIGHT         (CPT=73030)        Will get xrsy of shoulder;        No orders of the defined types were placed in this encounter.      Meds This Visit:  Requested Prescriptions      No prescriptions requested or ordered in this encounter       Imaging & Referrals:  None            [1]   Allergies  Allergen Reactions    Losartan Coughing     Tolerated in 2023

## 2025-03-25 ENCOUNTER — HOSPITAL ENCOUNTER (OUTPATIENT)
Dept: GENERAL RADIOLOGY | Age: 60
Discharge: HOME | End: 2025-03-25
Attending: INTERNAL MEDICINE
Payer: COMMERCIAL

## 2025-03-25 DIAGNOSIS — M25.511 ACUTE PAIN OF RIGHT SHOULDER: ICD-10-CM

## 2025-03-25 PROCEDURE — 73030 X-RAY EXAM OF SHOULDER: CPT | Performed by: INTERNAL MEDICINE

## 2025-03-30 ENCOUNTER — TELEPHONE (OUTPATIENT)
Dept: INTERNAL MEDICINE CLINIC | Facility: CLINIC | Age: 60
End: 2025-03-30

## 2025-03-30 DIAGNOSIS — M25.511 ACUTE PAIN OF RIGHT SHOULDER: Primary | ICD-10-CM

## 2025-04-09 RX ORDER — METOPROLOL TARTRATE 100 MG/1
100 TABLET ORAL DAILY
Qty: 90 TABLET | Refills: 1 | Status: SHIPPED | OUTPATIENT
Start: 2025-04-09

## 2025-04-09 RX ORDER — FUROSEMIDE 40 MG/1
40 TABLET ORAL DAILY
Qty: 30 TABLET | Refills: 0 | OUTPATIENT
Start: 2025-04-09

## 2025-04-09 RX ORDER — METOPROLOL TARTRATE 25 MG/1
25 TABLET, FILM COATED ORAL DAILY
Qty: 90 TABLET | Refills: 1 | Status: SHIPPED | OUTPATIENT
Start: 2025-04-09

## 2025-04-09 NOTE — TELEPHONE ENCOUNTER
Spoke with patient and confirmed with patient that he is taking this medication. He states that he only urinates once in the morning when he wakes up. Should patient stop this medication completely?

## 2025-04-09 NOTE — TELEPHONE ENCOUNTER
Find out if he is still taking this medication.  If he is making minimal to no urine should discontinue.

## 2025-05-06 ENCOUNTER — TELEPHONE (OUTPATIENT)
Dept: NEPHROLOGY | Facility: CLINIC | Age: 60
End: 2025-05-06

## 2025-06-17 ENCOUNTER — TELEPHONE (OUTPATIENT)
Dept: NEPHROLOGY | Facility: CLINIC | Age: 60
End: 2025-06-17

## 2025-06-17 NOTE — TELEPHONE ENCOUNTER
Medication Quantity Refills Start End   VELPHORO 500 MG Oral Chew Tab -- --  --   Sig:   Chew 3 tablets by mouth daily.     Route:   Oral     MISHA:   Yes         Pharmacy claim for rx has been rejected and requires prior authorization.    COVERMYMEDS KEY: [id number]

## 2025-06-18 NOTE — TELEPHONE ENCOUNTER
Rn called [facility] spoke to Mat about the note below and will take care of it ,Rn updated pt pharmacy provided [facility] #[phone] or fax#[phone] for any refill request.

## 2025-08-13 RX ORDER — AMLODIPINE BESYLATE 10 MG/1
10 TABLET ORAL EVERY MORNING
Qty: 90 TABLET | Refills: 1 | Status: SHIPPED | OUTPATIENT
Start: 2025-08-13

## 2025-08-28 ENCOUNTER — TELEPHONE (OUTPATIENT)
Dept: SURGERY | Facility: CLINIC | Age: 60
End: 2025-08-28

## (undated) DEVICE — MINOR GENERAL: Brand: MEDLINE INDUSTRIES, INC.

## (undated) DEVICE — TROCAR: Brand: KII FIOS FIRST ENTRY

## (undated) DEVICE — STAY.SAFE® CAP: Brand: STAY.SAFE®

## (undated) DEVICE — TROCARS: Brand: KII® OPTICAL ACCESS SYSTEM

## (undated) DEVICE — STAY.SAFE® CATHETER EXTENSION SET WITH LUER-LOCK, 18 INCH: Brand: STAY.SAFE®

## (undated) DEVICE — KIT CLEAN ENDOKIT 1.1OZ GOWNX2

## (undated) DEVICE — UNDYED BRAIDED (POLYGLACTIN 910), SYNTHETIC ABSORBABLE SUTURE: Brand: COATED VICRYL

## (undated) DEVICE — 20 ML SYRINGE LUER-LOCK TIP: Brand: MONOJECT

## (undated) DEVICE — 35 ML SYRINGE REGULAR TIP: Brand: MONOJECT

## (undated) DEVICE — FORCEP RADIAL JAW 4

## (undated) DEVICE — SNARE ENDOSCOPIC 10MM ROUND

## (undated) DEVICE — [HIGH FLOW INSUFFLATOR,  DO NOT USE IF PACKAGE IS DAMAGED,  KEEP DRY,  KEEP AWAY FROM SUNLIGHT,  PROTECT FROM HEAT AND RADIOACTIVE SOURCES.]: Brand: PNEUMOSURE

## (undated) DEVICE — SOLUTION IV 1000ML 0.9% NACL PRESERVATIVE

## (undated) DEVICE — INSUFFLATION NEEDLE TO ESTABLISH PNEUMOPERITONEUM.: Brand: INSUFFLATION NEEDLE

## (undated) DEVICE — MEDI-VAC NON-CONDUCTIVE SUCTION TUBING 6MM X 1.8M (6FT.) L: Brand: CARDINAL HEALTH

## (undated) DEVICE — LAP CHOLE: Brand: MEDLINE INDUSTRIES, INC.

## (undated) DEVICE — CATH PACK Y-TEC

## (undated) DEVICE — LINE MNTR ADLT SET O2 INTMD

## (undated) DEVICE — SET IRRIG L96IN POST OP W/ NVENT 2ND PIERCING

## (undated) DEVICE — CONMED SCOPE SAVER BITE BLOCK, 20X27 MM: Brand: SCOPE SAVER

## (undated) DEVICE — GLOVE SUR 7.5 SENSICARE PI PIP CRM PWD F

## (undated) DEVICE — SNARE OPTMZ PLPCTM TRP

## (undated) DEVICE — SOLUTION IRRIG 1000ML 0.9% NACL USP BTL

## (undated) DEVICE — SUT VCRL 2-0 36IN CT-1 ABSRB UD L36MM 1/2 CIR

## (undated) DEVICE — SUTURE ETHLN SZ 2-0 L18IN NONABSORB BLK

## (undated) DEVICE — GAMMEX® PI HYBRID SIZE 7.5, STERILE POWDER-FREE SURGICAL GLOVE, POLYISOPRENE AND NEOPRENE BLEND: Brand: GAMMEX

## (undated) DEVICE — MASK PROC W/VISOR ANTIGLARE

## (undated) DEVICE — ADHESIVE LIQ 2/3ML VI MASTISOL

## (undated) DEVICE — URINE DRAINAGE BAG,NEEDLE SAMPLING, ANTI-REFLUX DEVICE, DRAIN TUBE: Brand: DOVER

## (undated) DEVICE — KIT ENDO ORCAPOD 160/180/190

## (undated) DEVICE — TROCAR: Brand: KII® SLEEVE

## (undated) NOTE — LETTER
4/5/2018          To Whom It May Concern:    Juan Jose Mcguire is currently under my medical care. Patient is ok to go back to work and to operate a motorized vehicle. If you require additional information please contact our office.         Sincerely,

## (undated) NOTE — LETTER
No referring provider defined for this encounter. 09/16/22        Patient: Palmira Chavarria   YOB: 1965   Date of Visit: 9/16/2022       Dear  Dr. Nikki Montgomery MD,      Thank you for referring Palmira Chavarria to my practice. Please find my assessment and plan below. As you know he is a 60-year-old -American male with a history of adult onset diabetes mellitus diagnosed in 2004, history of hypertension diagnosed in 2000 and who I now had the pleasure of seeing for follow-up of chronic kidney disease stage IV associated with nephrotic range proteinuria thought to be secondary to diabetic nephropathy. Again the patient has been poorly compliant with follow-up. Last seen in July 2021. Checks blood pressures occasionally and systolics are usually in the 1 50-1 60 range with diastolics in the  range. He states his diabetes is better and his last A1c was down to 6.5. Otherwise feeling well. Notes occasional edema at the end of the day but better when he awakens in the morning. On physical exam his blood pressure was 161/98 with a pulse of 81 he weighed 215 pounds. Neck was supple without JVD. Lungs were clear. Heart revealed a regular rate and rhythm with an S4 but no gallops, murmurs or rubs. Abdomen was soft, flat, nontender without organomegaly, masses or bruits. Extremities revealed trace edema bilaterally. I reviewed his most recent labs done on July 23, 2022. Creatinine has increased significantly now up to 4.20 with an estimated GFR of 16 cc/min. Electrolytes were good. Hemoglobin 10.2. Urinalysis shows 3+ proteinuria but otherwise was unremarkable with a urine microalbumin to creatinine ratio 2331. Blood albumin 3.7. I therefore informed the patient that there has been significant deterioration in his renal function. The significance of a GFR of 16 cc/min and the indications for dialysis have been reviewed in detail.   Reinforced the importance of both blood pressure and blood sugar control. We will increase his Toprol-XL to 150 mg daily. Check blood pressures and heart rates daily and call me in 10 days. Repeat CBC and renal panel in 1 month. Again reinforced the importance of compliance. Return in 3 months. Thank you again for allowing me to participate in the care of your patient. If you have any questions please feel free to call.            Sincerely,   Jada Galeano MD   [address]  [place] [address] [place] [address]  [id number] [facility] [id number]    Document electronically generated by:  Jada Galeano MD

## (undated) NOTE — MR AVS SNAPSHOT
[place]  [address]  [phone]               Thank you for choosing us for your health care visit with Marianna Subramanian MD.  We are glad to serve you and happy to provide you with this summary Visit [facility]  You can access your MyChart to more actively manage your health care and view more details from this visit by going to [email or web address].   If you've recently had a stay at the Hospital you can access your discharge instructions i

## (undated) NOTE — LETTER
01/06/21        [address] [place] [address]      Dear Andreina Sol records indicate that you have outstanding lab work and or testing that was ordered for you and has not yet been completed:  Orders Placed This Encounter

## (undated) NOTE — LETTER
[address], [place]  Authorization for Invasive Procedures  1. I hereby authorize Dr. Jessica Fabian , my physician and whomever may be designated as the doctor's assistant, to perform the following operation and/or procedure:  Colonoscopy and Esophagogastroduodenoscopy on Lalita Sanon at [facility].    2. My physician has explained to me the nature and purpose of the operation or other procedure, possible alternative methods of treatment, the risks involved and the possibility of complications to me. I understand the probable consequences of declining the recommended procedure and the alternative methods of treatment. I acknowledge that no guarantee has been made as to the result that may be obtained. 3. I recognize that during the course of this operation or other procedure, unforeseen conditions may necessitate additional or different procedures than those listed above. I, therefore, further authorize and request that the above-named physician, his/her physician assistants, or designees perform such procedures as are, in his/her professional opinion, necessary and desirable. If I have a Do Not Attempt Resuscitation (DNAR) order in place, that status will be suspended while in the operating room, procedural suite, and during the recovery period unless otherwise explicitly stated by me (or a person authorized to consent on my behalf). The surgeon or my attending physician will determine when the applicable recovery period ends for purposes of reinstating the DNAR order. 4. Should the need arise during my operation or immediate post-operative period; I also consent to the administration of blood and/or blood products.  Further, I understand that despite careful testing and screening of blood and blood products, I may still be subject to ill effects as a result of recieving a blood transfusion an/or blood producst. The following are some, but not all, of the potential risks that can occur: fever and allergic reactions, hemolytic reactions, transmission of disease such as hepatitis, AIDS, cytomegalovirus (CMV), and flluid overload. In the event that I wish to have autologous transfusions of my own blood, or a directed donor transfusion, I will discuss this with my physician. 5. I consent to the photographing of the operations or procedures to be performed for the purposes of advancing medicine, science, and/or education, provided my identity is not revealed. If the procedure has been videotaped, the physician/surgeon will obtain the original videotape. The hospital will not be responsible for storage or maintenance of this tape. 6. I consent to the presence of a  or observer as deemed necessary by my physician or his designee. 7. Any tissues or organs removed in the operation or other procedure may be disposed of by and at the discretion of [facility].    8. I understand that the physician and his/her physician assistants may not be employees or agents of [facility], [facility], nor [facility], but are independent medical practitioners who have been permitted to use its facilities for the care and treatment of their patients. 9. Patients having a sterilization procedure: I understand that if the procedure is successful the results will be permanent and it will therefore be impossible for me to inseminate, conceive or bear children. I also understand that the procedure is intended to result in sterility, although the result has not been guaranteed. 10. I CERTIFY THAT I HAVE READ AND FULLY UNDERSTAND THE ABOVE CONSENT TO OPERATION and/or OTHER PROCEDURE. 11. I acknowledge that my physician has explained sedation/analgesia administration to me including the risks and benefits.  I consent to the administration of sedation/analgesia as may be necessary or desirable in the judgment of my physician. Signature of Patient:  ________________________________________________ Date: _________Time: _________    Responsible person in case of minor or unconscious: _____________________________Relationship: ____________     Witness Signature: ____________________________________________ Date: __________ Time: ___________    Statement of Physician  My signature below affirms that prior to the time of the procedure, I have explained to the patient and/or his legal representative, the risks and benefits involved in the proposed treatment and any reasonable alternative to the proposed treatment. I have also explained the risks and benefits involved in the refusal of the proposed treatment and have answered the patient's questions. If I have a significant financial interest in this procedure/surgery, I have disclosed this and had a discussion with my patient.     Signature of Physician:   ________________________________________Date: _________Time:_______ Patient Name: Merline Cordero  : 1965   Printed: 2022    Medical Record #: [id number]

## (undated) NOTE — LETTER
7/25/2022              [address]         Dear Ralph Alvarez,    This letter is to inform you that our office has made several attempts to reach you by phone without success. We were attempting to contact you by phone regarding prescription requestmetformin dose adjustment. Please contact our office at the number listed below as soon as you receive this letter to discuss this issue and to make the necessary changes in our system to your contact information. Thank you for your cooperation.         Sincerely,    Carmenza Nix MD  [address] [address]  [address] [facility]   [phone]

## (undated) NOTE — LETTER
No referring provider defined for this encounter. 09/19/23        Patient: Lori Dockery   YOB: 1965   Date of Visit: 9/18/2023       Dear  Dr. Karle Meckel, MD,      Thank you for referring Lori Dockery to my practice. Please find my assessment and plan below. As you know he is a 45-year-old male with a history of adult onset diabetes mellitus, hypertension and end-stage renal disease recently started on chronic hemodialysis who is now here for follow-up. Overall patient states he is doing much better without any chest pain, shortness of breath, GI or urinary tract symptoms. Energy and appetite are much better. Patient though was sent to the ER September 8, 2023 because of some discomfort along the right side of his neck where he has a right internal jugular tunneled hemodialysis catheter. A venous Doppler study was performed the right upper extremity and unfortunately showed acute occlusive thrombosis within the mid to lower right IJ vein. The patient was started on apixaban. States the discomfort that he previous felt is gone. Notes no swelling and blood flows are good at hemodialysis. No chest pain or shortness of breath. On physical exam his blood pressure 135/74 with a pulse of 86 and a weight 196 pounds. His neck was supple without JVD. The right IJ catheter is in place. There is no tenderness or swelling in this area. Exit site looks okay. Lungs were clear. Heart revealed a regular rate and rhythm with an S4 but no gallops, murmurs or rubs. Abdomen was soft, flat, nontender without again a megaly, masses or bruits. Extremities revealed no edema. Patient overall has improved significantly after starting hemodialysis. Patient though is now interested in transitioning to peritoneal dialysis. He is scheduled to see Dr. Lenora Miller tomorrow to discuss placement. The only concern is that he is now on apixaban.   We will repeat a Doppler venous ultrasound of his right upper extremity first week in October. We will see if the DVT has resolved. If not we may need to admit the patient and bridge him in order to safely place the peritoneal dialysis catheter. Thank you again for allowing me to participate in the care of your patient. If you have any questions please feel free to call.            Sincerely,   Elsy Shipley MD   [facility], [address] [place] [address]  [place] [address] [place] [address]  [address] [facility] [id number]    Document electronically generated by:  Elsy Shipley MD

## (undated) NOTE — LETTER
[place] [address]  Authorization for Invasive Procedures  Date: 5/20/2024        Time: 8:52 AM    I hereby authorize Dr. Patel Hills/Dr. Darion Olson, my physician and his/her assistants (if applicable), which may include medical students, residents, and/or fellows, to perform the following surgical operation/ procedure and administer such anesthesia as may be determined necessary by my physician: Tunneled hemodialysis catheter insertion on Gorge Alejandre  2.   I recognize that during the surgical operation/procedure, unforeseen conditions may necessitate additional or different procedures than those listed above.  I, therefore, further authorize and request that the above-named surgeon, assistants, or designees perform such procedures as are, in their judgment, necessary and desirable.    3.   My surgeon/physician has discussed prior to my surgery the potential benefits, risks and side effects of this procedure; the likelihood of achieving goals; and potential problems that might occur during recuperation.  They also discussed reasonable alternatives to the procedure, including risks, benefits, and side effects related to the alternatives and risks related to not receiving this procedure.  I have had all my questions answered and I acknowledge that no guarantee has been made as to the result that may be obtained.    4.   Should the need arise during my operation/procedure, which includes change of level of care prior to discharge, I also consent to the administration of blood and/or blood products.  Further, I understand that despite careful testing and screening of blood or blood products by collecting agencies, I may still be subject to ill effects as a result of receiving a blood transfusion and/or blood products.  The following are some, but not all, of the potential risks that can occur: fever and allergic reactions, hemolytic reactions, transmission of diseases such as  Hepatitis, AIDS and Cytomegalovirus (CMV) and fluid overload.  In the event that I wish to have an autologous transfusion of my own blood, or a directed donor transfusion, I will discuss this with my physician.   Check only if Refusing Blood or Blood Products  I understand refusal of blood or blood products as deemed necessary by my physician may have serious consequences to my condition to include possible death. I hereby assume responsibility for my refusal and release the hospital, its personnel, and my physicians from any responsibility for the consequences of my refusal.         o  Refuse         5.   I authorize the use of any specimen, organs, tissues, body parts or foreign objects that may be removed from my body during the operation/procedure for diagnosis, research or teaching purposes and their subsequent disposal by hospital authorities.  I also authorize the release of specimen test results and/or written reports to my treating physician on the hospital medical staff or other referring or consulting physicians involved in my care, at the discretion of the Pathologist or my treating physician.    6.   I consent to the photographing or videotaping of the operations or procedures to be performed, including appropriate portions of my body for medical, scientific, or educational purposes, provided my identity is not revealed by the pictures or by descriptive texts accompanying them.  If the procedure has been photographed/videotaped, the surgeon will obtain the original picture, image, videotape or CD.  The hospital will not be responsible for storage, release or maintenance of the picture, image, tape or CD.    7.   I consent to the presence of a  or observers in the operating room as deemed necessary by my physician or their designees.    8.   I recognize that in the event my procedure results in extended X-Ray/fluoroscopy time, I may develop a skin reaction.    9. If I have a Do Not  Attempt Resuscitation (DNAR) order in place, that status will be suspended while in the operating room, procedural suite, and during the recovery period unless otherwise explicitly stated by me (or a person authorized to consent on my behalf). The surgeon or my attending physician will determine when the applicable recovery period ends for purposes of reinstating the DNAR order.  10. Patients having a sterilization procedure: I understand that if the procedure is successful the results will be permanent and it will therefore be impossible for me to inseminate, conceive, or bear children.  I also understand that the procedure is intended to result in sterility, although the result has not been guaranteed.   11. I acknowledge that my physician has explained sedation/analgesia administration to me including the risk and benefits I consent to the administration of sedation/analgesia as may be necessary or desirable in the judgment of my physician.    I CERTIFY THAT I HAVE READ AND FULLY UNDERSTAND THE ABOVE CONSENT TO OPERATION and/or OTHER PROCEDURE.        ____________________________________       _________________________________      ______________________________  Signature of Patient         Signature of Responsible Person        Printed Name of Responsible Person        ____________________________________      _________________________________      ______________________________       Signature of Witness          Relationship to Patient                       Date                                       Time  Patient Name: Gorge Alejandre  : 1965    Reviewed: 2024   Printed: May 20, 2024  Medical Record #: [id number] Page 1 of 2             STATEMENT OF PHYSICIAN My signature below affirms that prior to the time of the procedure; I have explained to the patient and/or his/her legal representative, the risks and benefits involved in the proposed treatment and any reasonable alternative to the proposed  treatment. I have also explained the risks and benefits involved in refusal of the proposed treatment and alternatives to the proposed treatment and have answered the patient's questions. If I have a significant financial interest in a co-management agreement or a significant financial interest in any product or implant, or other significant relationship used in this procedure/surgery, I have disclosed this and had a discussion with my patient.     _______________________________________________________________ _____________________________  (Signature of Physician)                                                                                         (Date)                                   (Time)  Patient Name: Gorge Alejandre  : 1965    Reviewed: 2024   Printed: May 20, 2024  Medical Record #: [id number] Page 2 of 2

## (undated) NOTE — MR AVS SNAPSHOT
[address], [address]  [address] [facility]  [phone]               Thank you for choosing us for your health care visit with Lynette Dias MD.  We are glad to serve you and happy to provide you with this summary Losartan Potassium-HCTZ 50-12.5 MG Tabs   Take 1 tablet by mouth daily. Commonly known as:  HYZAAR           MetFORMIN HCl 1000 MG Tabs   Take 1 tablet (1,000 mg total) by mouth 2 (two) times daily with meals.    Commonly known as:  GLUCOPHAGE

## (undated) NOTE — LETTER
No referring provider defined for this encounter. 08/11/23        Patient: Claudine Guzmán   YOB: 1965   Date of Visit: 8/11/2023       Dear  Dr. Walt Rodríguez MD,      Thank you for referring Claudine Guzmán to my practice. Please find my assessment and plan below. As you know he is a 82-year-old -American male with a history of adult onset diabetes mellitus, hypertension who I now had the pleasure of seeing for follow-up of end-stage renal disease on chronic hemodialysis thought to be secondary to diabetic nephropathy. Patient had been poorly compliant with follow-up and had not been doing laboratory studies as ordered. Patient called us though and stated that he was not feeling very good and was having problems with nausea, anorexia and fatigue. We advised him to do laboratory studies ASAP which she did on August 1, 2023. His BUN was 152 with a creatinine of 15.7 with a hemoglobin of 7.8. The patient was directed to the emergency room. There is no reversible components and therefore chronic hemodialysis was initiated. He currently has a tunneled hemodialysis catheter in place. He had 3 treatments and was subsequently discharged home. He is currently receiving hemodialysis on a Monday Wednesday Friday schedule. Overall the patient states he is feeling much better. Appetite and energy level have improved. Tolerating hemodialysis without any difficulty. On physical exam his blood pressure 126/83 with a pulse of 84 and he weighed 198 pounds. His neck was supple without JVD. Lungs were clear. Heart revealed a regular rate and rhythm with an S4 but no gallops, murmurs or rubs. Abdomen was soft, flat, nontender without organomegaly, masses or bruits. Extremities revealed no edema. Overall the patient has improved significantly with initiation of chronic hemodialysis. Appetite is very good. Energy level is improving. Volume status is good as his blood pressure. I have recommended that he see our peritoneal dialysis nurse to review options including PD and renal transplantation. If he should opt for chronic hemodialysis will then need an AV fistula. Thank you again for allowing me to participate in the care of your patient. If you have any questions please feel free to call.            Sincerely,   Jennifer Cuellar MD   [facility], [address] [place] [address]  [place] [address] [place] [address]  [address] [facility] [id number]    Document electronically generated by:  Jennifer Cuellar MD

## (undated) NOTE — LETTER
[facility]  [address], [place], [place]    Authorization for Surgical Operation and Procedure                               I hereby authorize Annetta Willis MD, my physician and his/her assistants (if applicable), which may include medical students, residents, and/or fellows, to perform the following surgical operation/ procedure and administer such anesthesia as may be determined necessary by my physician: Operation/Procedure name (s) REMOVAL OF PERITONEAL DIALYSIS CATHETER on Gorge Alejandre   2.   I recognize that during the surgical operation/procedure, unforeseen conditions may necessitate additional or different procedures than those listed above.  I, therefore, further authorize and request that the above-named surgeon, assistants, or designees perform such procedures as are, in their judgment, necessary and desirable.    3.   My surgeon/physician has discussed prior to my surgery the potential benefits, risks and side effects of this procedure; the likelihood of achieving goals; and potential problems that might occur during recuperation.  They also discussed reasonable alternatives to the procedure, including risks, benefits, and side effects related to the alternatives and risks related to not receiving this procedure.  I have had all my questions answered and I acknowledge that no guarantee has been made as to the result that may be obtained.    4.   Should the need arise during my operation/procedure, which includes change of level of care prior to discharge, I also consent to the administration of blood and/or blood products.  Further, I understand that despite careful testing and screening of blood or blood products by collecting agencies, I may still be subject to ill effects as a result of receiving a blood transfusion and/or blood products.  The following are some, but not all, of the potential risks that can occur: fever and allergic reactions, hemolytic reactions,  transmission of diseases such as Hepatitis, AIDS and Cytomegalovirus (CMV) and fluid overload.  In the event that I wish to have an autologous transfusion of my own blood, or a directed donor transfusion, I will discuss this with my physician.  Check only if Refusing Blood or Blood Products  I understand refusal of blood or blood products as deemed necessary by my physician may have serious consequences to my condition to include possible death. I hereby assume responsibility for my refusal and release the hospital, its personnel, and my physicians from any responsibility for the consequences of my refusal.    o  Refuse   5.   I authorize the use of any specimen, organs, tissues, body parts or foreign objects that may be removed from my body during the operation/procedure for diagnosis, research or teaching purposes and their subsequent disposal by hospital authorities.  I also authorize the release of specimen test results and/or written reports to my treating physician on the hospital medical staff or other referring or consulting physicians involved in my care, at the discretion of the Pathologist or my treating physician.    6.   I consent to the photographing or videotaping of the operations or procedures to be performed, including appropriate portions of my body for medical, scientific, or educational purposes, provided my identity is not revealed by the pictures or by descriptive texts accompanying them.  If the procedure has been photographed/videotaped, the surgeon will obtain the original picture, image, videotape or CD.  The hospital will not be responsible for storage, release or maintenance of the picture, image, tape or CD.    7.   I consent to the presence of a  or observers in the operating room as deemed necessary by my physician or their designees.    8.   I recognize that in the event my procedure results in extended X-Ray/fluoroscopy time, I may develop a skin reaction.    9. If  I have a Do Not Attempt Resuscitation (DNAR) order in place, that status will be suspended while in the operating room, procedural suite, and during the recovery period unless otherwise explicitly stated by me (or a person authorized to consent on my behalf). The surgeon or my attending physician will determine when the applicable recovery period ends for purposes of reinstating the DNAR order.  10. Patients having a sterilization procedure: I understand that if the procedure is successful the results will be permanent and it will therefore be impossible for me to inseminate, conceive, or bear children.  I also understand that the procedure is intended to result in sterility, although the result has not been guaranteed.   11. I acknowledge that my physician has explained sedation/analgesia administration to me including the risk and benefits I consent to the administration of sedation/analgesia as may be necessary or desirable in the judgment of my physician.    I CERTIFY THAT I HAVE READ AND FULLY UNDERSTAND THE ABOVE CONSENT TO OPERATION and/or OTHER PROCEDURE.     ____________________________________  _________________________________        ______________________________  Signature of Patient    Signature of Responsible Person                Printed Name of Responsible Person                                      ____________________________________  _____________________________                ________________________________  Signature of Witness        Date  Time         Relationship to Patient    STATEMENT OF PHYSICIAN My signature below affirms that prior to the time of the procedure; I have explained to the patient and/or his/her legal representative, the risks and benefits involved in the proposed treatment and any reasonable alternative to the proposed treatment. I have also explained the risks and benefits involved in refusal of the proposed treatment and alternatives to the proposed treatment and have  answered the patient's questions. If I have a significant financial interest in a co-management agreement or a significant financial interest in any product or implant, or other significant relationship used in this procedure/surgery, I have disclosed this and had a discussion with my patient.     _____________________________________________________              _____________________________  (Signature of Physician)                                                                                         (Date)                                   (Time)  Patient Name: Gorge BRANNON Alejandre      : 1965      Printed: 2024     Medical Record #: [id number]                                      Page 1 of 1

## (undated) NOTE — LETTER
7/22/2019              [address] [address]       To whom it may concern,      This is to certify that Mr Katie Walker was seen and evaluated in our clinic today for his hypertension.  His blood pressure me

## (undated) NOTE — LETTER
[address] [facility] [place]  Authorization for Invasive Procedures  Date: 8/2/23           Time: 0730    I hereby authorize Lucien Amin, my physician and his/her assistants (if applicable), which may include medical students, residents, and/or fellows, to perform the following surgical operation/ procedure and administer such anesthesia as may be determined necessary by my physician: Central line - tunneled dialysis catheter placement  on Gorge Alejandre  2. I recognize that during the surgical operation/procedure, unforeseen conditions may necessitate additional or different procedures than those listed above. I, therefore, further authorize and request that the above-named surgeon, assistants, or designees perform such procedures as are, in their judgment, necessary and desirable. 3.   My surgeon/physician has discussed prior to my surgery the potential benefits, risks and side effects of this procedure; the likelihood of achieving goals; and potential problems that might occur during recuperation. They also discussed reasonable alternatives to the procedure, including risks, benefits, and side effects related to the alternatives and risks related to not receiving this procedure. I have had all my questions answered and I acknowledge that no guarantee has been made as to the result that may be obtained. 4.   Should the need arise during my operation/procedure, which includes change of level of care prior to discharge, I also consent to the administration of blood and/or blood products. Further, I understand that despite careful testing and screening of blood or blood products by collecting agencies, I may still be subject to ill effects as a result of receiving a blood transfusion and/or blood products.   The following are some, but not all, of the potential risks that can occur: fever and allergic reactions, hemolytic reactions, transmission of diseases such as Hepatitis, AIDS and Cytomegalovirus (CMV) and fluid overload. In the event that I wish to have an autologous transfusion of my own blood, or a directed donor transfusion, I will discuss this with my physician. Check only if Refusing Blood or Blood Products  I understand refusal of blood or blood products as deemed necessary by my physician may have serious consequences to my condition to include possible death. I hereby assume responsibility for my refusal and release the hospital, its personnel, and my physicians from any responsibility for the consequences of my refusal.         o  Refuse         5. I authorize the use of any specimen, organs, tissues, body parts or foreign objects that may be removed from my body during the operation/procedure for diagnosis, research or teaching purposes and their subsequent disposal by hospital authorities. I also authorize the release of specimen test results and/or written reports to my treating physician on the hospital medical staff or other referring or consulting physicians involved in my care, at the discretion of the Pathologist or my treating physician. 6.   I consent to the photographing or videotaping of the operations or procedures to be performed, including appropriate portions of my body for medical, scientific, or educational purposes, provided my identity is not revealed by the pictures or by descriptive texts accompanying them. If the procedure has been photographed/videotaped, the surgeon will obtain the original picture, image, videotape or CD. The hospital will not be responsible for storage, release or maintenance of the picture, image, tape or CD.    7.   I consent to the presence of a  or observers in the operating room as deemed necessary by my physician or their designees. 8.   I recognize that in the event my procedure results in extended X-Ray/fluoroscopy time, I may develop a skin reaction. 9.  If I have a Do Not Attempt Resuscitation (DNAR) order in place, that status will be suspended while in the operating room, procedural suite, and during the recovery period unless otherwise explicitly stated by me (or a person authorized to consent on my behalf). The surgeon or my attending physician will determine when the applicable recovery period ends for purposes of reinstating the DNAR order. 10. Patients having a sterilization procedure: I understand that if the procedure is successful the results will be permanent and it will therefore be impossible for me to inseminate, conceive, or bear children. I also understand that the procedure is intended to result in sterility, although the result has not been guaranteed. 11. I acknowledge that my physician has explained sedation/analgesia administration to me including the risk and benefits I consent to the administration of sedation/analgesia as may be necessary or desirable in the judgment of my physician.     I CERTIFY THAT I HAVE READ AND FULLY UNDERSTAND THE ABOVE CONSENT TO OPERATION and/or OTHER PROCEDURE.        ____________________________________       _________________________________      ______________________________  Signature of Patient         Signature of Responsible Person        Printed Name of Responsible Person        ____________________________________      _________________________________      ______________________________       Signature of Witness          Relationship to Patient                       Date                                       Time    Patient Name: Carisa Crawford     : 1965                 Printed: 2023      Medical Record #: [id number]                      Page 1 of 2          STATEMENT OF PHYSICIAN My signature below affirms that prior to the time of the procedure; I have explained to the patient and/or his/her legal representative, the risks and benefits involved in the proposed treatment and any reasonable alternative to the proposed treatment. I have also explained the risks and benefits involved in refusal of the proposed treatment and alternatives to the proposed treatment and have answered the patient's questions.  If I have a significant financial interest in a co-management agreement or a significant financial interest in any product or implant, or other significant relationship used in this procedure/surgery, I have disclosed this and had a discussion with my patient.     _______________________________________________________________ _____________________________  [facility])                                                                                         (Date)                                   (Time)    Patient Name: Merline Cordero     : 1965                 Printed: 2023      Medical Record #: [id number]                      Page 2 of 2

## (undated) NOTE — Clinical Note
Spoke with pt today for TCM--sent TE to office staff for appt clarification/new glucometer/supplies and f/u--thank you.   Future Appointments 8/11/2023  11:20 AM   Guille Heaton MD        [id number]        [facility] 9/8/2023   2:30 PM    Dannie Louise MD   [facility] 9/18/2023  3:20 PM    Leonora Pérez MD         [facility]             [facility]

## (undated) NOTE — LETTER
No referring provider defined for this encounter. 06/26/19        Patient: Juan Jose Mcguire   YOB: 1965   Date of Visit: 6/26/2019       Dear  Dr. Radha Palmer MD,      Thank you for referring Juan Jose Mcguire to my practice.   Please fin albumin was normal at 3.5. Urinalysis showed 100 mg/dL protein but otherwise was unremarkable. Urine for Bence Flaherty was negative. I therefore informed the patient that he does have proteinuria which has been progressive.   This most likely is secondar

## (undated) NOTE — LETTER
No referring provider defined for this encounter. 08/08/20        Patient: Kvng Thompson   YOB: 1965   Date of Visit: 8/7/2020       Dear  Dr. Qasim Manzo MD,      Thank you for referring Kvng Thompson to my practice.   Please find likely is secondary to diabetic nephropathy. I reemphasized the importance of both blood pressure and blood sugar control in order to help prevent progressive renal insufficiency and and the need for dialytic therapy.   He was advised to check his blood pr

## (undated) NOTE — LETTER
Tiffanie Nowak [address], [place] [address]  INFORMED CONSENT FOR TRANSFUSION OF BLOOD OR BLOOD PRODUCTS  My physician has informed me of the nature, purpose, benefits and risks of transfusion for blood and blood components that he/she may deem necessary during my treatment or hospitalization. He/she has also discussed alternatives to receiving blood from the voluntary blood supply with me, such as self-donation (autologous) and directed donation (blood donated by family or friends to be used specifically for me). I further understand that while the [address] [place] [address] will attempt to supply any autologous or directed donor blood prior to transfusion of blood from the routine blood supply, medical circumstances may require that other or additional blood components may be required for my care. In giving consent, I acknowledge that my physician has also informed me that despite careful screening and testing in accordance with national and regional regulations, there is still a small risk of transmission of infectious agents including hepatitis, HIV-1/2, cytomegalovirus and other viruses or diseases as yet unknown for which licensed definitive screening tests do not currently exist. Additionally, my physician has informed me of the potential for transfusion reactions not related to an infectious agent. [  ]  Check here for Recurring Outpatient Transfusion Therapy (valid for 1 year) In addition to the above, my physician has informed me that I shall receive numerous transfusions over a period of time and that these can lead to other increased risks. I hereby authorize the transfusion of blood and/or blood products to me as deemed necessary and ordered by physicians participating in my care.  My physician has given me the opportunity to ask questions and any questions asked have been answered to my satisfaction  __________________________________________ ______________________________________________  (Signature of Patient)                                                            (Responsible party in case of Minor,                                                                                                 Incompetent, or unconscious Patient)  ___________________________________________       ________ ______________________________________  (Relationship to Patient)                                                       (Signature of Witness)  ______________________________________________________________________________________________   (Date)                                                                           (Time)  REFUSAL OF CONSENT FOR BLOOD TRANSFUSIONS   Sign only if Refusing   [  ] I understand refusal of blood or blood products as deemed necessary by my physician may have serious consequences to my condition to include possible death.  I hereby assume responsibility for my refusal and release the hospital, its personnel, and my physicians from any responsibility for the consequences of my refusal.    ________________________________________________________________________________  (Signature of Patient)                                                         (Responsible Party/Relationship to Patient)    ________________________________________________________________________________  (Signature of Witness)                                                       (Date/Time)     Patient Name: Noy Centeno     : 1965                 Printed: 2023      Medical Record #: [id number]                                 Page 1 of 1

## (undated) NOTE — LETTER
[facility] 5SW/SE  [address]  [facility] [address]  [phone]    Blood Transfusion Consent    In the course of your treatment, it may become necessary to administer a transfusion of blood or blood components. This form provides basic information concerning this procedure and, if signed by you, authorizes its administration. By signing this form, you agree that all of your questions about the administration of blood or blood products have been answered by the ordering medical professional or designee.    Description of Procedure  Blood is introduced into one of your veins, commonly in the arm, using a sterilized disposable needle. The amount of blood transfused, and whether the transfusion will be of blood or blood components is a judgement the physician will make based on your particular needs.    Risks  The transfusion is a common procedure of low risk.  MINOR AND TEMPORARY REACTIONS ARE NOT UNCOMMON, including a slight bruise, swelling or local reaction in the area where the needle pierces your skin, or a nonserious reaction to the transfused material itself, including headache, fever or mild skin reaction, such as rash.  Serious reactions are possible, though very unlikely, and include severe allergic reaction (shock) and destruction (hemolysis) of transfused blood cells.  Infectious diseases which are known to be transmitted by blood transfusion include certain types of viral Hepatitis(liver infection from a virus), Human Immunodeficiency Virus (HIV-1,2) infection, a viral infection known to cause Acquired Immunodeficiency Syndrome (AIDS), as well as certain other bacterial, viral, and parasitic diseases. While a minimal risk of acquiring an infectious disease from transfused blood exists, in accordance with the Federal and State law, all due care has been taken in donor selection and testing to avoid transmission of disease.    Alternatives  If loss of blood poses serious threats during your  treatment, THERE IS NO EFFECTIVE ALTERNATIVE TO BLOOD TRANSFUSION. However, if you have any further questions on this matter, your provider will fully explain the alternatives to you if it has not already been done.    I, ______________________________, have read/had read to me the above. I understand the matters bearing on the decision whether or not to authorize a transfusion of blood or blood components. I have no questions which have not been answered to my full satisfaction. I hereby consent to such transfusion as my physician may deem necessary or advisable in the course of my treatment.    ______________________________________________                    ___________________________  (Signature of Patient or Responsible party in case of minor,                 (Printed Name of Patient or incompetent, or unconscious patient)              Responsible Party)    ___________________________               _____________________  (Relationship to Patient if not self)                                    (Date and Time)    __________________________                                                           ______________________              (Signature of Witness)               (Printed Name of Witness)     Language line ()    Telephone/Verbal/Video Consent    __________________________                     ____________________  (Signature of 2nd Witness           (Printed Name of 2nd  Telephone/Verbal/Video Consent)           Witness)    Patient Name: Gorge Alejandre     : 1965                 Printed: May 19, 2024     Medical Record #: [id number]      Rev: 2023

## (undated) NOTE — LETTER
[facility]  Administration of Anesthesia  1. Tasneem Mcgarry, or _________________________________ acting on his behalf, (Patient) (Dependent/Representative) request to receive anesthesia for my pending procedure/operation/treatment. A physician (anesthesiologist) alone or an anesthesiologist working with a nurse anesthetist may administer my anesthesia. 2. I understand that my anesthesiologist is not an employee or agent of the hospital, but is an independent medical practitioner who has been permitted to use its facilities for the care and treatment of his/her patients. 3. I acknowledge that a physician from [facility] or their designate(s), recommended anesthesia for me using her/his medical judgment. The type(s) of anesthesia I may receive include:                a) General Anesthesia, b) Spinal/Epidural Anesthesia, c) Regional Anesthesia or d) Monitored Anesthesia Care. 4. If my spinal, regional or monitored anesthesia care (local) is not satisfactory for my comfort, or if my medical condition requires, I consent to the administration of general anesthesia. 5. I am aware that the practice of anesthesiology is not an exact science and that some foreseeable risks or consequences may occur. Some common risks/consequences include sore throat and hoarseness, nausea and vomiting, muscle soreness, backache, damage to the mouth/teeth/vocal cords and eye injury. I understand that more rare but serious potential risks of anesthesia include blood pressure changes, drug reactions, cardiac arrest, brain damage, paralysis or death. These risks apply to whether I have general, spinal/epidural, regional or monitored anesthesia care. 6. OBSTETRIC PATIENTS: Specific risks/consequences of spinal/epidural anesthesia may include itching, low blood pressure, difficulty urinating, slowing of the baby's heart rate and headache.  Rare risks include infections, high spinal block, spinal bleeding, seizure, cardiac arrest and death. 7. AWARENESS: I understand that it is possible (but unlikely) to have explicit memory of events from the operating room while under general anesthesia. 8. ELECTROCONVULSIVE THERAPY PATIENTS: This consent serves for all treatments in a single course of therapy. 9. I understand that I must inform my anesthesiologist when I last ate and/or drank to minimize the risk of anesthesia. 10. If I am pregnant, or may pregnant, I understand that elective surgery should be postponed until after the baby is born. Anesthetics cross the placenta and may temporarily anesthetize the baby. Although fetal complications of anesthesia during pregnancy are rare, they may include birth defects, premature labor, brain damage and death. 11. I certify that I informed the anesthesiologist, to the best of my ability, about medication I take including blood thinners, anticoagulants, herbal remedies, narcotics and recreational drugs (e.g. cocaine, marijuana, PCP). Failure to inform my anesthesiologist about these medicines may increase my risk of anesthetic complications. The nature and purpose of my anesthetic management was explained to me. I had the opportunity to ask questions and the answers and information provided meet my satisfaction.   I retain the right to withdraw this consent at any time prior to the administration of said anesthetic.    ___________________________________________________           _____________________________________________________  Patient Signature                                                                                      Witness Signature                ___________________________________________________           _____________________________________________________  Date/Time                                                                                               Responsible person in case of minor/ unconscious pt /Relationship    My signature below affirms that prior to the time of the procedure, I have explained to the patient and/or his/her guardian, the risks and benefits of undergoing anesthesia, as well as any reasonable alternatives.     ___________________________________________________            _____________________________________________________  Physician Signature                            Date/Time  Patient Name: Bk Muse     : 1965     Printed: 3/1/2022      Medical Record #: [id number]                              Page 1 of     ----------ANESTHESIA CONSENT----------

## (undated) NOTE — LETTER
03/11/25        Gorge Alejandre  [address] [facility] [address]      Dear Gorge,    Our records indicate that you have outstanding lab work and or testing that was ordered for you and has not yet been completed:  CT ABDOMEN+PELVIS     To provide you with the best possible care, please complete these orders at your earliest convenience. If you have recently completed these orders please disregard this letter.     If you have any questions please call the office at [phone].    Thank you,       Urology Staff

## (undated) NOTE — LETTER
6/23/2020              [address] [facility] [address]         To Whom it May Concern,    Mr. Ej Sheikh came in for an annual dilated eye exam today. He currently wears glasses only for reading.  His unaided vision

## (undated) NOTE — LETTER
No referring provider defined for this encounter. 07/27/21        Patient: Nando Saucedo   YOB: 1965   Date of Visit: 7/27/2021       Dear  Dr. Michael Crenshaw MD,      Thank you for referring Nando Saucedo to my practice.   Please fin high.  Reinforced the importance of both blood pressure and blood sugar control. Low-salt diabetic diet. He states he can do a better job of drinking fluids. Avoid nonsteroidals. We will increase his Toprol-XL to 100 mg daily.   Check blood pressures an

## (undated) NOTE — LETTER
11/7/2019              [address] [facility] [address]         To whom it may concern,    Marly Root is my patient and under my care.  His blood pressure has being treated and is under control with his current blood pres

## (undated) NOTE — MR AVS SNAPSHOT
[address], [address]  [address] [facility]  [phone]               Thank you for choosing us for your health care visit with Link Guajardo MD.  We are glad to serve you and happy to provide you with this summary Dysfunction.    Commonly known as:  CIALIS                Where to Get Your Medications      These medications were sent to [address] [place] [address], [address] [phone], [facility] [address]     Phon your Zip Code and Date of Birth to complete the sign-up process. If you do not sign up before the expiration date, you must request a new code.     Your unique [employer] Access Code: [email or web address]  Expires: 4/10/2017  3:21 PM    If you have questions, you can c Eat plenty of low-fat dairy products High fat meats and dairy   Choose whole grain products Foods high in sodium   Water is best for hydration Fast food.    Eat at home when possible     Tips for increasing your physical activity – Adults who are physically

## (undated) NOTE — LETTER
Coronavirus Disease 2019 (COVID-19)     [place] [address] is committed to the safety and well-being of our patients, members, employees, and communities.  As concerns arise about the new strain of coronavirus that causes COVID-19, [place] [address] medical appointment, call the healthcare provider ahead of time and tell them that you have or may have COVID-19.  5. For medical emergencies, call 911 and notify the dispatch personnel that you have or may have COVID-19.   6. Cover your cough and sneezes. days have passed since symptoms first appeared OR if asymptomatic patient or date of symptom onset is unclear then use 10 days post COVID test date.    · At least 20 days have passed for severe illness (requiring hospitalization) OR if you are immunocomprom required to have a repeat COVID-19 test in order to be eligible to donate. If you’re instructed by Seamus Vance that a repeat test is required, please contact the [address] [facility] COVID-19 Nurse Triage Line at [phone].     Additional Information      You can

## (undated) NOTE — MR AVS SNAPSHOT
After Visit Summary   4/4/2018    Kvng Thompson    MRN: [id number]           Visit Information     Date & Time  4/4/2018  9:30 AM Provider  Matthew Foy [facility]  Phone  [phone]      Your Vital CARD ECHO STRESS ECHO/REST AND STRESS(CPT=93350/24173 [facility] [address]) [COMBO CPT(R)]  4/4/2018 (Approximate) 4/4/2019      Follow-up Instructions    Return in about 3 months (around 7/4/2018). Instructions    Follow low-sodium diet as discussed.   [address] [facility],[address] [place] [address]  Monday - Friday  10:00 am - 10:00 pm  Saturday - Sunday  10:00 am - 4:00 pm      [address]  Monday - Friday  4:00 pm - 10:00 pm  Saturday - Sunday  10:00 am - 4:00 pm       Conditions needing urgent attention, but are not life-threatening

## (undated) NOTE — LETTER
10/22/18        [address] [facility]      Dear Renetta Major records indicate that you have outstanding lab work and or testing that was ordered for you and has not yet been completed:  Orders Placed This Encounter

## (undated) NOTE — LETTER
12/07/19        [address] [facility]      Dear Jose Gain records indicate that you have outstanding lab work and or testing that was ordered for you and has not yet been completed:  Orders Placed This Encounter